# Patient Record
Sex: FEMALE | Race: OTHER | HISPANIC OR LATINO
[De-identification: names, ages, dates, MRNs, and addresses within clinical notes are randomized per-mention and may not be internally consistent; named-entity substitution may affect disease eponyms.]

---

## 2020-05-21 PROBLEM — Z00.00 ENCOUNTER FOR PREVENTIVE HEALTH EXAMINATION: Status: ACTIVE | Noted: 2020-05-21

## 2020-05-22 ENCOUNTER — APPOINTMENT (OUTPATIENT)
Dept: DISASTER EMERGENCY | Facility: CLINIC | Age: 60
End: 2020-05-22

## 2020-05-22 DIAGNOSIS — Z01.818 ENCOUNTER FOR OTHER PREPROCEDURAL EXAMINATION: ICD-10-CM

## 2020-05-23 LAB — SARS-COV-2 N GENE NPH QL NAA+PROBE: NOT DETECTED

## 2020-05-26 ENCOUNTER — APPOINTMENT (OUTPATIENT)
Dept: DISASTER EMERGENCY | Facility: CLINIC | Age: 60
End: 2020-05-26

## 2020-05-27 LAB — SARS-COV-2 N GENE NPH QL NAA+PROBE: NOT DETECTED

## 2020-09-21 ENCOUNTER — INPATIENT (INPATIENT)
Facility: HOSPITAL | Age: 60
LOS: 4 days | Discharge: HOME | DRG: 872 | End: 2020-09-26
Attending: INTERNAL MEDICINE | Admitting: INTERNAL MEDICINE
Payer: MEDICAID

## 2020-09-21 VITALS
WEIGHT: 178.57 LBS | RESPIRATION RATE: 18 BRPM | SYSTOLIC BLOOD PRESSURE: 138 MMHG | DIASTOLIC BLOOD PRESSURE: 73 MMHG | HEIGHT: 64.17 IN | OXYGEN SATURATION: 98 % | HEART RATE: 106 BPM

## 2020-09-21 DIAGNOSIS — N17.9 ACUTE KIDNEY FAILURE, UNSPECIFIED: ICD-10-CM

## 2020-09-21 DIAGNOSIS — Z98.890 OTHER SPECIFIED POSTPROCEDURAL STATES: Chronic | ICD-10-CM

## 2020-09-21 DIAGNOSIS — N39.0 URINARY TRACT INFECTION, SITE NOT SPECIFIED: ICD-10-CM

## 2020-09-21 DIAGNOSIS — Z29.9 ENCOUNTER FOR PROPHYLACTIC MEASURES, UNSPECIFIED: ICD-10-CM

## 2020-09-21 DIAGNOSIS — I10 ESSENTIAL (PRIMARY) HYPERTENSION: ICD-10-CM

## 2020-09-21 DIAGNOSIS — A41.9 SEPSIS, UNSPECIFIED ORGANISM: ICD-10-CM

## 2020-09-21 DIAGNOSIS — E78.1 PURE HYPERGLYCERIDEMIA: ICD-10-CM

## 2020-09-21 DIAGNOSIS — E11.9 TYPE 2 DIABETES MELLITUS WITHOUT COMPLICATIONS: ICD-10-CM

## 2020-09-21 DIAGNOSIS — Z90.710 ACQUIRED ABSENCE OF BOTH CERVIX AND UTERUS: Chronic | ICD-10-CM

## 2020-09-21 DIAGNOSIS — C64.9 MALIGNANT NEOPLASM OF UNSPECIFIED KIDNEY, EXCEPT RENAL PELVIS: ICD-10-CM

## 2020-09-21 LAB
ALBUMIN SERPL ELPH-MCNC: 3.4 G/DL — LOW (ref 3.5–5)
ALP SERPL-CCNC: 70 U/L — SIGNIFICANT CHANGE UP (ref 40–120)
ALT FLD-CCNC: 30 U/L DA — SIGNIFICANT CHANGE UP (ref 10–60)
ANION GAP SERPL CALC-SCNC: 4 MMOL/L — LOW (ref 5–17)
APPEARANCE UR: CLEAR — SIGNIFICANT CHANGE UP
APTT BLD: 30.6 SEC — SIGNIFICANT CHANGE UP (ref 27.5–35.5)
AST SERPL-CCNC: 39 U/L — SIGNIFICANT CHANGE UP (ref 10–40)
BACTERIA # UR AUTO: ABNORMAL /HPF
BASOPHILS # BLD AUTO: 0.02 K/UL — SIGNIFICANT CHANGE UP (ref 0–0.2)
BASOPHILS NFR BLD AUTO: 0.4 % — SIGNIFICANT CHANGE UP (ref 0–2)
BILIRUB SERPL-MCNC: 0.4 MG/DL — SIGNIFICANT CHANGE UP (ref 0.2–1.2)
BILIRUB UR-MCNC: NEGATIVE — SIGNIFICANT CHANGE UP
BUN SERPL-MCNC: 14 MG/DL — SIGNIFICANT CHANGE UP (ref 7–18)
CALCIUM SERPL-MCNC: 9.7 MG/DL — SIGNIFICANT CHANGE UP (ref 8.4–10.5)
CHLORIDE SERPL-SCNC: 104 MMOL/L — SIGNIFICANT CHANGE UP (ref 96–108)
CO2 SERPL-SCNC: 26 MMOL/L — SIGNIFICANT CHANGE UP (ref 22–31)
COLOR SPEC: YELLOW — SIGNIFICANT CHANGE UP
COMMENT - URINE: SIGNIFICANT CHANGE UP
CREAT ?TM UR-MCNC: 58 MG/DL — SIGNIFICANT CHANGE UP
CREAT SERPL-MCNC: 1.37 MG/DL — HIGH (ref 0.5–1.3)
DIFF PNL FLD: ABNORMAL
EOSINOPHIL # BLD AUTO: 0 K/UL — SIGNIFICANT CHANGE UP (ref 0–0.5)
EOSINOPHIL NFR BLD AUTO: 0 % — SIGNIFICANT CHANGE UP (ref 0–6)
EPI CELLS # UR: ABNORMAL /HPF
GLUCOSE BLDC GLUCOMTR-MCNC: 234 MG/DL — HIGH (ref 70–99)
GLUCOSE BLDC GLUCOMTR-MCNC: 295 MG/DL — HIGH (ref 70–99)
GLUCOSE SERPL-MCNC: 138 MG/DL — HIGH (ref 70–99)
GLUCOSE UR QL: NEGATIVE — SIGNIFICANT CHANGE UP
HCT VFR BLD CALC: 35.6 % — SIGNIFICANT CHANGE UP (ref 34.5–45)
HGB BLD-MCNC: 11.9 G/DL — SIGNIFICANT CHANGE UP (ref 11.5–15.5)
IMM GRANULOCYTES NFR BLD AUTO: 0.4 % — SIGNIFICANT CHANGE UP (ref 0–1.5)
INR BLD: 1.15 RATIO — SIGNIFICANT CHANGE UP (ref 0.88–1.16)
KETONES UR-MCNC: NEGATIVE — SIGNIFICANT CHANGE UP
LACTATE SERPL-SCNC: 1.3 MMOL/L — SIGNIFICANT CHANGE UP (ref 0.7–2)
LEUKOCYTE ESTERASE UR-ACNC: ABNORMAL
LYMPHOCYTES # BLD AUTO: 0.71 K/UL — LOW (ref 1–3.3)
LYMPHOCYTES # BLD AUTO: 14.8 % — SIGNIFICANT CHANGE UP (ref 13–44)
MCHC RBC-ENTMCNC: 27.7 PG — SIGNIFICANT CHANGE UP (ref 27–34)
MCHC RBC-ENTMCNC: 33.4 GM/DL — SIGNIFICANT CHANGE UP (ref 32–36)
MCV RBC AUTO: 83 FL — SIGNIFICANT CHANGE UP (ref 80–100)
MONOCYTES # BLD AUTO: 0.41 K/UL — SIGNIFICANT CHANGE UP (ref 0–0.9)
MONOCYTES NFR BLD AUTO: 8.5 % — SIGNIFICANT CHANGE UP (ref 2–14)
NEUTROPHILS # BLD AUTO: 3.64 K/UL — SIGNIFICANT CHANGE UP (ref 1.8–7.4)
NEUTROPHILS NFR BLD AUTO: 75.9 % — SIGNIFICANT CHANGE UP (ref 43–77)
NITRITE UR-MCNC: POSITIVE
NRBC # BLD: 0 /100 WBCS — SIGNIFICANT CHANGE UP (ref 0–0)
OSMOLALITY UR: 343 MOS/KG — SIGNIFICANT CHANGE UP (ref 50–1200)
PH UR: 6 — SIGNIFICANT CHANGE UP (ref 5–8)
PLATELET # BLD AUTO: 193 K/UL — SIGNIFICANT CHANGE UP (ref 150–400)
POTASSIUM SERPL-MCNC: 4 MMOL/L — SIGNIFICANT CHANGE UP (ref 3.5–5.3)
POTASSIUM SERPL-SCNC: 4 MMOL/L — SIGNIFICANT CHANGE UP (ref 3.5–5.3)
PROT ?TM UR-MCNC: 11 MG/DL — SIGNIFICANT CHANGE UP (ref 0–12)
PROT SERPL-MCNC: 8.1 G/DL — SIGNIFICANT CHANGE UP (ref 6–8.3)
PROT UR-MCNC: NEGATIVE — SIGNIFICANT CHANGE UP
PROTHROM AB SERPL-ACNC: 13.4 SEC — SIGNIFICANT CHANGE UP (ref 10.6–13.6)
RAPID RVP RESULT: SIGNIFICANT CHANGE UP
RBC # BLD: 4.29 M/UL — SIGNIFICANT CHANGE UP (ref 3.8–5.2)
RBC # FLD: 13.2 % — SIGNIFICANT CHANGE UP (ref 10.3–14.5)
RBC CASTS # UR COMP ASSIST: ABNORMAL /HPF (ref 0–2)
SODIUM SERPL-SCNC: 134 MMOL/L — LOW (ref 135–145)
SODIUM UR-SCNC: 81 MMOL/L — SIGNIFICANT CHANGE UP
SP GR SPEC: 1.01 — SIGNIFICANT CHANGE UP (ref 1.01–1.02)
UROBILINOGEN FLD QL: NEGATIVE — SIGNIFICANT CHANGE UP
WBC # BLD: 4.8 K/UL — SIGNIFICANT CHANGE UP (ref 3.8–10.5)
WBC # FLD AUTO: 4.8 K/UL — SIGNIFICANT CHANGE UP (ref 3.8–10.5)
WBC UR QL: >50 /HPF (ref 0–5)

## 2020-09-21 PROCEDURE — 74176 CT ABD & PELVIS W/O CONTRAST: CPT | Mod: 26

## 2020-09-21 PROCEDURE — 99285 EMERGENCY DEPT VISIT HI MDM: CPT

## 2020-09-21 PROCEDURE — 71045 X-RAY EXAM CHEST 1 VIEW: CPT | Mod: 26

## 2020-09-21 PROCEDURE — 71250 CT THORAX DX C-: CPT | Mod: 26

## 2020-09-21 RX ORDER — CEFEPIME 1 G/1
2000 INJECTION, POWDER, FOR SOLUTION INTRAMUSCULAR; INTRAVENOUS ONCE
Refills: 0 | Status: COMPLETED | OUTPATIENT
Start: 2020-09-21 | End: 2020-09-21

## 2020-09-21 RX ORDER — ACETAMINOPHEN 500 MG
650 TABLET ORAL EVERY 6 HOURS
Refills: 0 | Status: DISCONTINUED | OUTPATIENT
Start: 2020-09-21 | End: 2020-09-26

## 2020-09-21 RX ORDER — MEROPENEM 1 G/30ML
1000 INJECTION INTRAVENOUS EVERY 8 HOURS
Refills: 0 | Status: DISCONTINUED | OUTPATIENT
Start: 2020-09-22 | End: 2020-09-23

## 2020-09-21 RX ORDER — INSULIN LISPRO 100/ML
10 VIAL (ML) SUBCUTANEOUS ONCE
Refills: 0 | Status: COMPLETED | OUTPATIENT
Start: 2020-09-21 | End: 2020-09-21

## 2020-09-21 RX ORDER — FENOFIBRIC ACID 105 MG/1
160 TABLET ORAL DAILY
Refills: 0 | Status: DISCONTINUED | OUTPATIENT
Start: 2020-09-21 | End: 2020-09-21

## 2020-09-21 RX ORDER — SODIUM CHLORIDE 9 MG/ML
1000 INJECTION INTRAMUSCULAR; INTRAVENOUS; SUBCUTANEOUS
Refills: 0 | Status: DISCONTINUED | OUTPATIENT
Start: 2020-09-21 | End: 2020-09-26

## 2020-09-21 RX ORDER — LEVOTHYROXINE SODIUM 125 MCG
88 TABLET ORAL
Refills: 0 | Status: DISCONTINUED | OUTPATIENT
Start: 2020-09-21 | End: 2020-09-26

## 2020-09-21 RX ORDER — INSULIN LISPRO 100/ML
10 VIAL (ML) SUBCUTANEOUS
Refills: 0 | Status: DISCONTINUED | OUTPATIENT
Start: 2020-09-21 | End: 2020-09-23

## 2020-09-21 RX ORDER — ACETAMINOPHEN 500 MG
650 TABLET ORAL ONCE
Refills: 0 | Status: COMPLETED | OUTPATIENT
Start: 2020-09-21 | End: 2020-09-21

## 2020-09-21 RX ORDER — SODIUM CHLORIDE 9 MG/ML
1700 INJECTION INTRAMUSCULAR; INTRAVENOUS; SUBCUTANEOUS ONCE
Refills: 0 | Status: COMPLETED | OUTPATIENT
Start: 2020-09-21 | End: 2020-09-21

## 2020-09-21 RX ORDER — ATORVASTATIN CALCIUM 80 MG/1
80 TABLET, FILM COATED ORAL AT BEDTIME
Refills: 0 | Status: DISCONTINUED | OUTPATIENT
Start: 2020-09-21 | End: 2020-09-23

## 2020-09-21 RX ORDER — INSULIN LISPRO 100/ML
VIAL (ML) SUBCUTANEOUS
Refills: 0 | Status: DISCONTINUED | OUTPATIENT
Start: 2020-09-21 | End: 2020-09-26

## 2020-09-21 RX ORDER — ERGOCALCIFEROL 1.25 MG/1
50000 CAPSULE ORAL
Refills: 0 | Status: DISCONTINUED | OUTPATIENT
Start: 2020-09-21 | End: 2020-09-26

## 2020-09-21 RX ORDER — VANCOMYCIN HCL 1 G
1250 VIAL (EA) INTRAVENOUS EVERY 12 HOURS
Refills: 0 | Status: DISCONTINUED | OUTPATIENT
Start: 2020-09-22 | End: 2020-09-23

## 2020-09-21 RX ORDER — VANCOMYCIN HCL 1 G
1000 VIAL (EA) INTRAVENOUS ONCE
Refills: 0 | Status: COMPLETED | OUTPATIENT
Start: 2020-09-21 | End: 2020-09-21

## 2020-09-21 RX ORDER — HEPARIN SODIUM 5000 [USP'U]/ML
5000 INJECTION INTRAVENOUS; SUBCUTANEOUS EVERY 8 HOURS
Refills: 0 | Status: DISCONTINUED | OUTPATIENT
Start: 2020-09-21 | End: 2020-09-26

## 2020-09-21 RX ORDER — FENOFIBRATE,MICRONIZED 130 MG
160 CAPSULE ORAL DAILY
Refills: 0 | Status: DISCONTINUED | OUTPATIENT
Start: 2020-09-21 | End: 2020-09-21

## 2020-09-21 RX ORDER — INSULIN GLARGINE 100 [IU]/ML
32 INJECTION, SOLUTION SUBCUTANEOUS AT BEDTIME
Refills: 0 | Status: DISCONTINUED | OUTPATIENT
Start: 2020-09-21 | End: 2020-09-22

## 2020-09-21 RX ADMIN — Medication 650 MILLIGRAM(S): at 19:30

## 2020-09-21 RX ADMIN — Medication 6: at 22:24

## 2020-09-21 RX ADMIN — Medication 650 MILLIGRAM(S): at 15:33

## 2020-09-21 RX ADMIN — Medication 650 MILLIGRAM(S): at 20:12

## 2020-09-21 RX ADMIN — CEFEPIME 100 MILLIGRAM(S): 1 INJECTION, POWDER, FOR SOLUTION INTRAMUSCULAR; INTRAVENOUS at 14:06

## 2020-09-21 RX ADMIN — Medication 10 UNIT(S): at 17:48

## 2020-09-21 RX ADMIN — CEFEPIME 2000 MILLIGRAM(S): 1 INJECTION, POWDER, FOR SOLUTION INTRAMUSCULAR; INTRAVENOUS at 16:31

## 2020-09-21 RX ADMIN — HEPARIN SODIUM 5000 UNIT(S): 5000 INJECTION INTRAVENOUS; SUBCUTANEOUS at 22:25

## 2020-09-21 RX ADMIN — SODIUM CHLORIDE 75 MILLILITER(S): 9 INJECTION INTRAMUSCULAR; INTRAVENOUS; SUBCUTANEOUS at 22:23

## 2020-09-21 RX ADMIN — Medication 250 MILLIGRAM(S): at 13:47

## 2020-09-21 RX ADMIN — SODIUM CHLORIDE 1700 MILLILITER(S): 9 INJECTION INTRAMUSCULAR; INTRAVENOUS; SUBCUTANEOUS at 13:46

## 2020-09-21 RX ADMIN — ATORVASTATIN CALCIUM 80 MILLIGRAM(S): 80 TABLET, FILM COATED ORAL at 22:23

## 2020-09-21 RX ADMIN — Medication 650 MILLIGRAM(S): at 16:29

## 2020-09-21 RX ADMIN — INSULIN GLARGINE 32 UNIT(S): 100 INJECTION, SOLUTION SUBCUTANEOUS at 22:24

## 2020-09-21 NOTE — H&P ADULT - PROBLEM SELECTOR PLAN 3
- BUN/Cr 14/1.37   - could be  prerenal given poor PO intake   - c/w IVF   - f/u BMP   - S/p IVF 1.7 L   f/u Cr urine , Urine lytes , Sodium urine , total protein urine  f/u  FeNa

## 2020-09-21 NOTE — H&P ADULT - NUTRITIONAL ASSESSMENT
Pt taking Metoprolol 50 mg qd  c/w  Metoprolol 50 mg with parameters  Monitor BP closely and adjust medications if clinically indicated.  Dash diet.    - Pt taking Carvedilol , Amlodipine at home    - c/w  Carvedilol , Amlodipine with parameters  - Monitor BP closely and adjust medications if clinically indicated.  - Dash diet when not NPO.

## 2020-09-21 NOTE — H&P ADULT - ASSESSMENT
59 y/o F from home, living alone with [employer] program (niece Karina is a caregiver 5hrs/ day 5days/wk)  with a significant PMHx of HTN, DM and metastatic renal CA (since 2008, mets to breast and pancreas, on chemo x about a year every 2 weeks),  PSH of Left kidney removal in 2008 and bladder reconstruction in 2008, hysterectomy presents to the ED with nonproductive cough x 4 days associated with fever and chills. Patient's last chemo was 2 weeks ago. Patient seen by PMD Dr. Valdivia today, noted for fever 103F and was sent in for further evaluation. Patient relates to history of PNA 2 years ago. Reports slight decreased appetite with no nausea, vomiting, diarrhea or shortness of breath. Accompanied by her niece. Pt lost weight about 20 lbs in 6 months.    In ED;   , fever 102F, cefepime and vanco, NS bolus 1.7L was given. UA positive, CXR shows L basilar atelectasis, no infiltration.

## 2020-09-21 NOTE — CONSULT NOTE ADULT - SUBJECTIVE AND OBJECTIVE BOX
Patient is a 60y old  Female from home, living alone with [employer] [facility] (mohamud Collins is a caregiver 5hrs/ day 5days/wk)  with a significant PMHx of HTN, DM and metastatic renal CA (since , mets to breast and pancreas, on chemo x about a year every 2 weeks),  PSH of Left kidney removal in  and bladder reconstruction in , hysterectomy, now presents to the ER for evaluation of nonproductive cough, fever and chills. Patient's last chemo was 2 weeks ago. Patient seen by PMD Dr. Valdivia today, noted for fever 103F and was sent in for further evaluation. On admission, he found to have fever, tachycardia, positive Urine analysis and negative CXR. She has started on Meropenem and Vancomycin, and the ID consult requested to assist with further evaluation and antibiotic management.         REVIEW OF SYSTEMS: Total of twelve systems have been reviewed with patient and found to be negative unless mentioned in HPI          PAST MEDICAL & SURGICAL HISTORY:  Renal cancer  DM (diabetes mellitus)  HTN (hypertension)  H/O bladder repair surgery  History of kidney surgery  S/P hysterectomy        SOCIAL HISTORY  Alcohol: Does not drink  Tobacco: Does not smoke  Illicit substance use: None      FAMILY HISTORY: Non contributory to the present illness        ALLERGIES: sulfa drugs (Hives)        Vital Signs Last 24 Hrs  T(C): 38.2 (21 Sep 2020 19:00), Max: 38.9 (21 Sep 2020 15:14)  T(F): 100.7 (21 Sep 2020 19:00), Max: 102.1 (21 Sep 2020 15:14)  HR: 98 (21 Sep 2020 19:00) (98 - 106)  BP: 125/56 (21 Sep 2020 19:00) (125/56 - 154/70)  BP(mean): 72 (21 Sep 2020 19:00) (72 - 72)  RR: 18 (21 Sep 2020 19:00) (18 - 18)  SpO2: 98% (21 Sep 2020 19:00) (98% - 100%)        PHYSICAL EXAM:  GENERAL: Not in distress   CHEST/LUNG: Not using accessory muscles  HEART: s1 and s2 present  ABDOMEN:  Nontender and  Nondistended  EXTREMITIES: No pedal  edema  CNS: Awake and Alert        LABS:                        11.9   4.80  )-----------( 193      ( 21 Sep 2020 14:00 )             35.6         09-21    134<L>  |  104  |  14  ----------------------------<  138<H>  4.0   |  26  |  1.37<H>    Ca    9.7      21 Sep 2020 14:00    TPro  8.1  /  Alb  3.4<L>  /  TBili  0.4  /  DBili  x   /  AST  39  /  ALT  30  /  AlkPhos  70      PT/INR - ( 21 Sep 2020 14:00 )   PT: 13.4 sec;   INR: 1.15 ratio      PTT - ( 21 Sep 2020 14:00 )  PTT:30.6 sec        CAPILLARY BLOOD GLUCOSE  POCT Blood Glucose.: 234 mg/dL (21 Sep 2020 17:32)        Urinalysis Basic - ( 21 Sep 2020 14:00 )  Color: Yellow / Appearance: Clear / S.010 / pH: x  Gluc: x / Ketone: Negative  / Bili: Negative / Urobili: Negative   Blood: x / Protein: Negative / Nitrite: Positive   Leuk Esterase: Moderate / RBC: 2-5 /HPF / WBC >50 /HPF   Sq Epi: x / Non Sq Epi: Moderate /HPF / Bacteria: Many /HPF        MEDICATIONS  (STANDING):  atorvastatin 80 milliGRAM(s) Oral at bedtime  ergocalciferol 99860 Unit(s) Oral <User Schedule>  fenofibrate Tablet 160 milliGRAM(s) Oral daily  heparin   Injectable 5000 Unit(s) SubCutaneous every 8 hours  insulin glargine Injectable (LANTUS) 32 Unit(s) SubCutaneous at bedtime  insulin lispro (HumaLOG) corrective regimen sliding scale   SubCutaneous three times a day before meals  insulin lispro Injectable (HumaLOG) 10 Unit(s) SubCutaneous three times a day before meals  levothyroxine 88 MICROGram(s) Oral <User Schedule>    MEDICATIONS  (PRN):  acetaminophen   Tablet .. 650 milliGRAM(s) Oral every 6 hours PRN Temp greater or equal to 38C (100.4F)        RADIOLOGY & ADDITIONAL TESTS:    20 : Xray Chest 1 View-PORTABLE IMMEDIATE (20 @ 13:41) : No evidence for pulmonary consolidation, pleural effusion or pneumothorax.    Small left basilar atelectasis.  The trachea is midline.   The cardiac silhouette is magnified by AP technique.               Patient is a 60y old  Female from home, [facility] [employer] [facility] (mohamud Collins is a caregiver 5hrs/ day 5days/wk)  with a significant PMHx of HTN, DM and metastatic renal CA (since , mets to breast and pancreas, on chemo x about a year every 2 weeks),  PSH of Left kidney removal in  and bladder reconstruction in , hysterectomy, now presents to the ER for evaluation of nonproductive cough, fever and chills. Patient's last chemo was 2 weeks ago. Patient seen by PMD Dr. Valdivia today, noted for fever 103F and was sent in for further evaluation. On admission, he found to have fever, tachycardia, positive Urine analysis and negative CXR. She has started on Meropenem and Vancomycin, and the ID consult requested to assist with further evaluation and antibiotic management.         REVIEW OF SYSTEMS: Total of twelve systems have been reviewed with patient and found to be negative unless mentioned in HPI        PAST MEDICAL & SURGICAL HISTORY:  Renal cancer  DM (diabetes mellitus)  HTN (hypertension)  H/O bladder repair surgery  History of kidney surgery  S/P hysterectomy        SOCIAL HISTORY  Alcohol: Does not drink  Tobacco: Does not smoke  Illicit substance use: None        FAMILY HISTORY: Non contributory to the present illness        ALLERGIES: sulfa drugs (Hives)        Vital Signs Last 24 Hrs  T(C): 38.2 (21 Sep 2020 19:00), Max: 38.9 (21 Sep 2020 15:14)  T(F): 100.7 (21 Sep 2020 19:00), Max: 102.1 (21 Sep 2020 15:14)  HR: 98 (21 Sep 2020 19:00) (98 - 106)  BP: 125/56 (21 Sep 2020 19:00) (125/56 - 154/70)  BP(mean): 72 (21 Sep 2020 19:00) (72 - 72)  RR: 18 (21 Sep 2020 19:00) (18 - 18)  SpO2: 98% (21 Sep 2020 19:00) (98% - 100%)        PHYSICAL EXAM:  GENERAL: Not in distress   CHEST/LUNG: Not using accessory muscles  HEART: s1 and s2 present  ABDOMEN:  Nontender and  Nondistended  EXTREMITIES: No pedal  edema  CNS: Awake and Alert        LABS:                        11.9   4.80  )-----------( 193      ( 21 Sep 2020 14:00 )             35.6         09-21    134<L>  |  104  |  14  ----------------------------<  138<H>  4.0   |  26  |  1.37<H>    Ca    9.7      21 Sep 2020 14:00    TPro  8.1  /  Alb  3.4<L>  /  TBili  0.4  /  DBili  x   /  AST  39  /  ALT  30  /  AlkPhos  70    PT/INR - ( 21 Sep 2020 14:00 )   PT: 13.4 sec;   INR: 1.15 ratio    PTT - ( 21 Sep 2020 14:00 )  PTT:30.6 sec      CAPILLARY BLOOD GLUCOSE  POCT Blood Glucose.: 234 mg/dL (21 Sep 2020 17:32)      Urinalysis Basic - ( 21 Sep 2020 14:00 )  Color: Yellow / Appearance: Clear / S.010 / pH: x  Gluc: x / Ketone: Negative  / Bili: Negative / Urobili: Negative   Blood: x / Protein: Negative / Nitrite: Positive   Leuk Esterase: Moderate / RBC: 2-5 /HPF / WBC >50 /HPF   Sq Epi: x / Non Sq Epi: Moderate /HPF / Bacteria: Many /HPF        MEDICATIONS  (STANDING):  atorvastatin 80 milliGRAM(s) Oral at bedtime  ergocalciferol 86739 Unit(s) Oral <User Schedule>  fenofibrate Tablet 160 milliGRAM(s) Oral daily  heparin   Injectable 5000 Unit(s) SubCutaneous every 8 hours  insulin glargine Injectable (LANTUS) 32 Unit(s) SubCutaneous at bedtime  insulin lispro (HumaLOG) corrective regimen sliding scale   SubCutaneous three times a day before meals  insulin lispro Injectable (HumaLOG) 10 Unit(s) SubCutaneous three times a day before meals  levothyroxine 88 MICROGram(s) Oral <User Schedule>    MEDICATIONS  (PRN):  acetaminophen   Tablet .. 650 milliGRAM(s) Oral every 6 hours PRN Temp greater or equal to 38C (100.4F)        RADIOLOGY & ADDITIONAL TESTS:    20 : Xray Chest 1 View-PORTABLE IMMEDIATE (20 @ 13:41) : No evidence for pulmonary consolidation, pleural effusion or pneumothorax.    Small left basilar atelectasis.  The trachea is midline.   The cardiac silhouette is magnified by AP technique.

## 2020-09-21 NOTE — H&P ADULT - PROBLEM SELECTOR PLAN 4
- Patient takes Metformin, basaglar 65units at 5pm, humalog  20units in AM, 28units at noon, 22 units in the evening at home  - will hold metformin  - will start lower dose of insulin, lantus 32units _ humalog 10units TID + sliding scale  - pt has continuous glucose monitoring device on her abd ( Dexcom g6), but niece was told to make sure with finger stick if it's low or high  - monitor blood glucose with finger stick in the hospital  - f/u HgA1c  - diabetic diet

## 2020-09-21 NOTE — H&P ADULT - HISTORY OF PRESENT ILLNESS
No 59 y/o F from home, living alone with [employer] program (niece Karina is a caregiver 5hrs/ day 5days/wk)  with a significant PMHx of HTN, DM and metastatic renal CA (since 2008, mets to breast and pancreas, on chemo x about a year every 2 weeks),  PSH of Left kidney removal in 2008 and bladder reconstruction in 2008, hysterectomy presents to the ED with nonproductive cough x 4 days associated with fever and chills. Patient's last chemo was 2 weeks ago. Patient seen by PMD Dr. Valdivia today, noted for fever 103F and was sent in for further evaluation. Patient relates to history of PNA 2 years ago. Reports slight decreased appetite with no nausea, vomiting, diarrhea or shortness of breath. Accompanied by her niece. Pt lost weight about 20 lbs in 6 months.    In ED;   , fever 102F, cefepime and vanco, NS bolus 1.7L was given. UA positive, CXR shows L basilar atelectasis, no infiltration.    GOC: FULL CODE

## 2020-09-21 NOTE — ED PROVIDER NOTE - CLINICAL SUMMARY MEDICAL DECISION MAKING FREE TEXT BOX
61 y/o F on chemo for renal CA presents for fever, cough and chills. Concern for PNA/sepsis. Will perform sepsis workup and reassess.

## 2020-09-21 NOTE — H&P ADULT - PROBLEM SELECTOR PLAN 2
- positive UA   - febrile 102F, pt is on chemo: complicated UTI  - no dysuria , urinary incontinence or flank pain  - Lactate 1.3   - c/w IV fluids   - Will start with meropenem + vanco daily  - f/u blood cultures (specimen received)   - f/u urine cultures (specimen received)  ** ID consulted Dr. Rojas

## 2020-09-21 NOTE — H&P ADULT - PROBLEM SELECTOR PLAN 6
- Pt is not taking MILADIS meds at home    - Monitor BP closely and adjust medications if clinically indicated.  - DASH diet. - Pt is not taking PO meds at home    - Monitor BP closely and adjust medications if clinically indicated.  - DASH diet.

## 2020-09-21 NOTE — H&P ADULT - PROBLEM SELECTOR PLAN 8
IMPROVE VTE Individual Risk Assessment  RISK                                                                Points  [  ] Previous VTE                                                  3  [  ] Thrombophilia                                               2  [  ] Lower limb paralysis                                      2        (unable to hold up >15 seconds)    [x  ] Current Cancer                                              2         (within 6 months)  [  x] Immobilization > 24 hrs                                1  [  ] ICU/CCU stay > 24 hours                              1  [ x ] Age > 60                                                      1  IMPROVE VTE Score ____4_____  Heparin SC for DVT ppx due to JAX

## 2020-09-21 NOTE — H&P ADULT - PROBLEM SELECTOR PLAN 1
p/w fever 102F , , meets SIRS criteria, UA positive, no WBC count ,lactate 1.3  - Likely 2/2 UTI vs fever malignancy  -s/p cefepime 2g + vanco, 1.7 L NS bolus  in the ED   - will start on meropenem + vanco till urine cx comes back   - f/u UCx and BCx.   ID Dr. Rojas consulted

## 2020-09-21 NOTE — ED ADULT TRIAGE NOTE - CHIEF COMPLAINT QUOTE
Non productive cough with fever x 3 days ,on s/p chemo 2 weeks ago ,h/o willem breast ca with mets to kidney, referred by PCP to r/o neutropenic fever per ems

## 2020-09-21 NOTE — H&P ADULT - PROBLEM SELECTOR PLAN 5
Pt is following Dr. Pleaez  - pt was diagnosed in 2008, L kidney was removed, had chemo for a year, stopped then started about a year ago  - On chemo every 2 weeks, last chemo was 2 wks ago  [facility] was notified and consulted

## 2020-09-21 NOTE — ED PROVIDER NOTE - OBJECTIVE STATEMENT
61 y/o F with a significant PMHx of HTN, DM and metastatic renal CA presents to the ED with nonproductive cough x 4 days associated with fever and chills. Patient's last chemo was 2 weeks ago. Patient seen by PMD Dr. Valdivia today and was sent in for further evaluation. Patient relates to history of PNA 2 years ago. Reports normal appetite with no nausea, vomiting, diarrhea or shortness of breath. Accompanied by daughter.

## 2020-09-21 NOTE — ED PROVIDER NOTE - CARE PLAN
Principal Discharge DX:	UTI (urinary tract infection)  Secondary Diagnosis:	Fever  Secondary Diagnosis:	Renal cancer

## 2020-09-21 NOTE — H&P ADULT - ATTENDING COMMENTS
59 y/o F from home, living alone with [employer] [facility] (niece Karina is a caregiver 5hrs/ day 5days/wk)  with a significant PMHx of HTN, DM and metastatic renal CA (since 2008, mets to breast and pancreas, on chemo x about a year every 2 weeks),  PSH of Left kidney removal in 2008 and bladder reconstruction in 2008, hysterectomy presents to the ED with nonproductive cough x 4 days associated with fever and chills. Patient's last chemo was 2 weeks ago. Patient seen by PMD Dr. Olivera today, noted for fever 103F and was sent in for further evaluation. Patient relates to history of PNA 2 years ago. Reports slight decreased appetite with no nausea, vomiting, diarrhea or shortness of breath. Accompanied by her niece. Pt lost weight about 20 lbs in 6 months.    In ED;   , fever 102F, cefepime and vanco, NS bolus 1.7L was given. UA positive, CXR shows L basilar atelectasis, no infiltration.    GOC: FULL CODE       assessment  --  uti, sepsis, acute bronchitis, paola, h/o HTN, DM and metastatic renal CA (since 2008, mets to breast and pancreas, on chemo x about a year every 2 weeks),  PSH of Left kidney removal in 2008 and bladder reconstruction in 2008, hysterectomy    plan  --  admit to med, rocephin, bronchodilators, lantus, humalog actid, cont preadmit home meds, gi and dvt profilaxis,  cbc, bmp, mg, phos, lipids, tsh, bld cx, ucx, hgba1c      id cons  pulm cons  heme-onc cons   endo cons

## 2020-09-21 NOTE — CONSULT NOTE ADULT - ASSESSMENT
Patient is a 60y old  Female from home, [facility] (mohamud Collins is a caregiver 5hrs/ day 5days/wk)  with a significant PMHx of HTN, DM and metastatic renal CA (since 2008, mets to breast and pancreas, on chemo x about a year every 2 weeks),  PSH of Left kidney removal in 2008 and bladder reconstruction in 2008, hysterectomy, now presents to the ER for evaluation of nonproductive cough, fever and chills. Patient's last chemo was 2 weeks ago. Patient seen by PMD Dr. Valdivia today, noted for fever 103F and was sent in for further evaluation. On admission, he found to have fever, tachycardia, positive Urine analysis and negative CXR. She has started on Meropenem and Vancomycin, and the ID consult requested to assist with further evaluation and antibiotic management.       # Sepsis ( Fever + tachycardia )  # UTI  # Metastatic Renal cancer- on chemo therapy, last one was about 2 weeks ago    would recommend:    1. Follow up Urine and Blood cultures  2. Monitor Temp. and c/w supportive care  3. Aspiration precaution  4. Continue Meropenem and Vancomycin until work up is done  5. Follow up COVID 19 PCR and c/w precaution  6. Consider CT chest without contrast     d/w Admitting  resident     will follow the patient with you and make further recommendation based on the clinical course and Lab results  Thank you for the opportunity to participate in Ms. SELF's care      Attending Attestation:    Spent more than 65 minutes on total encounter, more than 50 % of the visit was spent counseling and/or coordinating care by the Attending physician.         Patient is a 60y old  Female from home, [facility] (mohamud Collins is a caregiver 5hrs/ day 5days/wk)  with a significant PMHx of HTN, DM and metastatic renal CA (since 2008, mets to breast and pancreas, on chemo x about a year every 2 weeks),  PSH of Left kidney removal in 2008 and bladder reconstruction in 2008, hysterectomy, now presents to the ER for evaluation of nonproductive cough, fever and chills. Patient's last chemo was 2 weeks ago. Patient seen by PMD Dr. Valdivia today, noted for fever 103F and was sent in for further evaluation. On admission, he found to have fever, tachycardia, positive Urine analysis and negative CXR. She has started on Meropenem and Vancomycin, and the ID consult requested to assist with further evaluation and antibiotic management.     # Sepsis ( Fever + tachycardia )  # UTI  # Metastatic Renal cancer- on chemo therapy, last one was about 2 weeks ago    would recommend:    1. Follow up Urine and Blood cultures  2. Monitor Temp. and c/w supportive care  3. Aspiration precaution  4. Continue Meropenem and Vancomycin until work up is done  5. Follow up COVID 19 PCR and c/w precaution  6. Consider CT chest without contrast     d/w Admitting  resident     will follow the patient with you and make further recommendation based on the clinical course and Lab results  Thank you for the opportunity to participate in Ms. SELF's care      Attending Attestation:    Spent more than 65 minutes on total encounter, more than 50 % of the visit was spent counseling and/or coordinating care by the Attending physician.

## 2020-09-21 NOTE — H&P ADULT - NSHPREVIEWOFSYSTEMS_GEN_ALL_CORE
61 y/o F with a significant PMHx of HTN, DM and metastatic renal CA presents to the ED with nonproductive cough x 4 days associated with fever and chills. Patient's last chemo was 2 weeks ago. Patient seen by PMD Dr. Valdivia today and was sent in for further evaluation. Patient relates to history of PNA 2 years ago. Reports normal appetite with no nausea, vomiting, diarrhea or shortness of breath. Accompanied by daughter

## 2020-09-22 DIAGNOSIS — J45.909 UNSPECIFIED ASTHMA, UNCOMPLICATED: ICD-10-CM

## 2020-09-22 DIAGNOSIS — R91.1 SOLITARY PULMONARY NODULE: ICD-10-CM

## 2020-09-22 LAB
24R-OH-CALCIDIOL SERPL-MCNC: 34.4 NG/ML — SIGNIFICANT CHANGE UP (ref 30–80)
A1C WITH ESTIMATED AVERAGE GLUCOSE RESULT: 7.5 % — HIGH (ref 4–5.6)
ANION GAP SERPL CALC-SCNC: 6 MMOL/L — SIGNIFICANT CHANGE UP (ref 5–17)
BASOPHILS # BLD AUTO: 0.04 K/UL — SIGNIFICANT CHANGE UP (ref 0–0.2)
BASOPHILS NFR BLD AUTO: 1 % — SIGNIFICANT CHANGE UP (ref 0–2)
BUN SERPL-MCNC: 14 MG/DL — SIGNIFICANT CHANGE UP (ref 7–18)
CALCIUM SERPL-MCNC: 8.7 MG/DL — SIGNIFICANT CHANGE UP (ref 8.4–10.5)
CHLORIDE SERPL-SCNC: 109 MMOL/L — HIGH (ref 96–108)
CHOLEST SERPL-MCNC: 118 MG/DL — SIGNIFICANT CHANGE UP (ref 10–199)
CO2 SERPL-SCNC: 23 MMOL/L — SIGNIFICANT CHANGE UP (ref 22–31)
CREAT SERPL-MCNC: 1.28 MG/DL — SIGNIFICANT CHANGE UP (ref 0.5–1.3)
EOSINOPHIL # BLD AUTO: 0 K/UL — SIGNIFICANT CHANGE UP (ref 0–0.5)
EOSINOPHIL NFR BLD AUTO: 0 % — SIGNIFICANT CHANGE UP (ref 0–6)
ESTIMATED AVERAGE GLUCOSE: 169 MG/DL — HIGH (ref 68–114)
FERRITIN SERPL-MCNC: 143 NG/ML — SIGNIFICANT CHANGE UP (ref 15–150)
FOLATE SERPL-MCNC: >20 NG/ML — SIGNIFICANT CHANGE UP
GLUCOSE BLDC GLUCOMTR-MCNC: 134 MG/DL — HIGH (ref 70–99)
GLUCOSE BLDC GLUCOMTR-MCNC: 242 MG/DL — HIGH (ref 70–99)
GLUCOSE BLDC GLUCOMTR-MCNC: 272 MG/DL — HIGH (ref 70–99)
GLUCOSE BLDC GLUCOMTR-MCNC: 281 MG/DL — HIGH (ref 70–99)
GLUCOSE SERPL-MCNC: 281 MG/DL — HIGH (ref 70–99)
HCT VFR BLD CALC: 30.1 % — LOW (ref 34.5–45)
HCV AB S/CO SERPL IA: 0.12 S/CO — SIGNIFICANT CHANGE UP (ref 0–0.99)
HCV AB SERPL-IMP: SIGNIFICANT CHANGE UP
HDLC SERPL-MCNC: 23 MG/DL — LOW
HGB BLD-MCNC: 10.2 G/DL — LOW (ref 11.5–15.5)
IRON SATN MFR SERPL: 14 UG/DL — LOW (ref 40–150)
IRON SATN MFR SERPL: 4 % — LOW (ref 15–50)
LIPID PNL WITH DIRECT LDL SERPL: 52 MG/DL — SIGNIFICANT CHANGE UP
LYMPHOCYTES # BLD AUTO: 0.57 K/UL — LOW (ref 1–3.3)
LYMPHOCYTES # BLD AUTO: 14 % — SIGNIFICANT CHANGE UP (ref 13–44)
MAGNESIUM SERPL-MCNC: 1.6 MG/DL — SIGNIFICANT CHANGE UP (ref 1.6–2.6)
MANUAL SMEAR VERIFICATION: SIGNIFICANT CHANGE UP
MCHC RBC-ENTMCNC: 27.9 PG — SIGNIFICANT CHANGE UP (ref 27–34)
MCHC RBC-ENTMCNC: 33.9 GM/DL — SIGNIFICANT CHANGE UP (ref 32–36)
MCV RBC AUTO: 82.5 FL — SIGNIFICANT CHANGE UP (ref 80–100)
MONOCYTES # BLD AUTO: 0.2 K/UL — SIGNIFICANT CHANGE UP (ref 0–0.9)
MONOCYTES NFR BLD AUTO: 5 % — SIGNIFICANT CHANGE UP (ref 2–14)
NEUTROPHILS # BLD AUTO: 3.26 K/UL — SIGNIFICANT CHANGE UP (ref 1.8–7.4)
NEUTROPHILS NFR BLD AUTO: 78 % — HIGH (ref 43–77)
NEUTS BAND # BLD: 2 % — SIGNIFICANT CHANGE UP (ref 0–8)
NRBC # BLD: 0 /100 — SIGNIFICANT CHANGE UP (ref 0–0)
OSMOLALITY SERPL: 293 MOSMOL/KG — SIGNIFICANT CHANGE UP (ref 280–301)
PHOSPHATE SERPL-MCNC: 2.1 MG/DL — LOW (ref 2.5–4.5)
PLAT MORPH BLD: NORMAL — SIGNIFICANT CHANGE UP
PLATELET # BLD AUTO: 162 K/UL — SIGNIFICANT CHANGE UP (ref 150–400)
POTASSIUM SERPL-MCNC: 3.8 MMOL/L — SIGNIFICANT CHANGE UP (ref 3.5–5.3)
POTASSIUM SERPL-SCNC: 3.8 MMOL/L — SIGNIFICANT CHANGE UP (ref 3.5–5.3)
PROCALCITONIN SERPL-MCNC: 0.39 NG/ML — HIGH (ref 0.02–0.1)
RBC # BLD: 3.59 M/UL — LOW (ref 3.8–5.2)
RBC # BLD: 3.65 M/UL — LOW (ref 3.8–5.2)
RBC # FLD: 13 % — SIGNIFICANT CHANGE UP (ref 10.3–14.5)
RBC BLD AUTO: NORMAL — SIGNIFICANT CHANGE UP
RETICS #: 48.5 K/UL — SIGNIFICANT CHANGE UP (ref 25–125)
RETICS/RBC NFR: 1.4 % — SIGNIFICANT CHANGE UP (ref 0.5–2.5)
SARS-COV-2 IGG SERPL QL IA: POSITIVE
SARS-COV-2 IGM SERPL IA-ACNC: 87.8 INDEX — HIGH
SARS-COV-2 RNA SPEC QL NAA+PROBE: SIGNIFICANT CHANGE UP
SODIUM SERPL-SCNC: 138 MMOL/L — SIGNIFICANT CHANGE UP (ref 135–145)
TIBC SERPL-MCNC: 312 UG/DL — SIGNIFICANT CHANGE UP (ref 250–450)
TOTAL CHOLESTEROL/HDL RATIO MEASUREMENT: 5.1 RATIO — SIGNIFICANT CHANGE UP (ref 3.3–7.1)
TRIGL SERPL-MCNC: 213 MG/DL — HIGH (ref 10–149)
TSH SERPL-MCNC: 6.34 UU/ML — HIGH (ref 0.34–4.82)
UIBC SERPL-MCNC: 298 UG/DL — SIGNIFICANT CHANGE UP (ref 110–370)
VIT B12 SERPL-MCNC: 338 PG/ML — SIGNIFICANT CHANGE UP (ref 232–1245)
WBC # BLD: 4.08 K/UL — SIGNIFICANT CHANGE UP (ref 3.8–10.5)
WBC # FLD AUTO: 4.08 K/UL — SIGNIFICANT CHANGE UP (ref 3.8–10.5)

## 2020-09-22 RX ORDER — INSULIN GLARGINE 100 [IU]/ML
40 INJECTION, SOLUTION SUBCUTANEOUS AT BEDTIME
Refills: 0 | Status: DISCONTINUED | OUTPATIENT
Start: 2020-09-22 | End: 2020-09-23

## 2020-09-22 RX ORDER — PREGABALIN 225 MG/1
1000 CAPSULE ORAL ONCE
Refills: 0 | Status: COMPLETED | OUTPATIENT
Start: 2020-09-22 | End: 2020-09-24

## 2020-09-22 RX ADMIN — Medication 10 UNIT(S): at 07:56

## 2020-09-22 RX ADMIN — Medication 10 UNIT(S): at 17:39

## 2020-09-22 RX ADMIN — Medication 166.67 MILLIGRAM(S): at 11:45

## 2020-09-22 RX ADMIN — Medication 4: at 07:56

## 2020-09-22 RX ADMIN — MEROPENEM 100 MILLIGRAM(S): 1 INJECTION INTRAVENOUS at 05:11

## 2020-09-22 RX ADMIN — MEROPENEM 100 MILLIGRAM(S): 1 INJECTION INTRAVENOUS at 22:38

## 2020-09-22 RX ADMIN — HEPARIN SODIUM 5000 UNIT(S): 5000 INJECTION INTRAVENOUS; SUBCUTANEOUS at 05:10

## 2020-09-22 RX ADMIN — INSULIN GLARGINE 40 UNIT(S): 100 INJECTION, SOLUTION SUBCUTANEOUS at 22:35

## 2020-09-22 RX ADMIN — Medication 6: at 11:21

## 2020-09-22 RX ADMIN — Medication 650 MILLIGRAM(S): at 11:00

## 2020-09-22 RX ADMIN — Medication 6: at 17:38

## 2020-09-22 RX ADMIN — ATORVASTATIN CALCIUM 80 MILLIGRAM(S): 80 TABLET, FILM COATED ORAL at 22:39

## 2020-09-22 RX ADMIN — Medication 10 UNIT(S): at 11:21

## 2020-09-22 RX ADMIN — HEPARIN SODIUM 5000 UNIT(S): 5000 INJECTION INTRAVENOUS; SUBCUTANEOUS at 15:44

## 2020-09-22 RX ADMIN — HEPARIN SODIUM 5000 UNIT(S): 5000 INJECTION INTRAVENOUS; SUBCUTANEOUS at 22:39

## 2020-09-22 RX ADMIN — Medication 88 MICROGRAM(S): at 05:11

## 2020-09-22 RX ADMIN — MEROPENEM 100 MILLIGRAM(S): 1 INJECTION INTRAVENOUS at 15:44

## 2020-09-22 NOTE — CHART NOTE - NSCHARTNOTEFT_GEN_A_CORE
Chart and meds reviewed.  Patient seen and examined.    CC: Fever    SUBJECTIVE/ROS: feels "better" Denies CP/palpitation/SOB/HA/dizziness/abd pain/n/v/d/f/c/dysuria/hematuria; urinates much      MEDICATIONS  (STANDING):  atorvastatin 80 milliGRAM(s) Oral at bedtime  ergocalciferol 38411 Unit(s) Oral <User Schedule>  heparin   Injectable 5000 Unit(s) SubCutaneous every 8 hours  insulin glargine Injectable (LANTUS) 32 Unit(s) SubCutaneous at bedtime  insulin lispro (HumaLOG) corrective regimen sliding scale   SubCutaneous three times a day before meals  insulin lispro Injectable (HumaLOG) 10 Unit(s) SubCutaneous three times a day before meals  levothyroxine 88 MICROGram(s) Oral <User Schedule>  meropenem  IVPB 1000 milliGRAM(s) IV Intermittent every 8 hours  sodium chloride 0.9%. 1000 milliLiter(s) (75 mL/Hr) IV Continuous <Continuous>  Triglide (fenofibrate) 160 milliGRAM(s) 160 milliGRAM(s) Oral daily  vancomycin  IVPB 1250 milliGRAM(s) IV Intermittent every 12 hours    MEDICATIONS  (PRN):  acetaminophen   Tablet .. 650 milliGRAM(s) Oral every 6 hours PRN Temp greater or equal to 38C (100.4F)      VITALS:  Vital Signs Last 24 Hrs  T(C): 37.4 (22 Sep 2020 07:18), Max: 38.9 (21 Sep 2020 15:14)  T(F): 99.4 (22 Sep 2020 07:18), Max: 102.1 (21 Sep 2020 15:14)  HR: 92 (22 Sep 2020 07:18) (92 - 106)  BP: 102/41 (22 Sep 2020 07:18) (102/41 - 154/70)  BP(mean): 72 (21 Sep 2020 19:00) (72 - 72)  RR: 18 (22 Sep 2020 07:18) (18 - 18)  SpO2: 100% (22 Sep 2020 07:18) (96% - 100%)      PHYSICAL EXAM:    HEENT:  pupils equal and reactive, EOMI, no oropharyngeal lesions, erythema, exudates, oral thrush    NECK:   supple, no carotid bruits, no palpable lymph nodes, no thyromegaly    CV:  +S1, +S2, regular, no murmurs or rubs    RESP:   lungs clear to auscultation bilaterally, no wheezing, rales, rhonchi, good air entry bilaterally    BREAST:  not examined    GI:  obese abd soft, non-tender, non-distended, normal BS, no bruits, no abdominal masses, no palpable masses    RECTAL:  not examined    :  no suprapubic tenderness    MSK:   normal muscle tone, no atrophy, no rigidity, no contractions    EXT:   no clubbing, no cyanosis, no edema, no calf pain, swelling or erythema    VASCULAR:  pulses equal and symmetric in the upper and lower extremities    NEURO:  AAOX3, no focal neurological deficits, follows all commands, able to move extremities spontaneously    SKIN:  no ulcers, lesions or rashes        LABS:                        10.2   4.08  )-----------( 162      ( 22 Sep 2020 06:17 )             30.1     09-22    138  |  109<H>  |  14  ----------------------------<  281<H>  3.8   |  23  |  1.28    Ca    8.7      22 Sep 2020 06:17  Phos  2.1     -  Mg     1.6     -    TPro  8.1  /  Alb  3.4<L>  /  TBili  0.4  /  DBili  x   /  AST  39  /  ALT  30  /  AlkPhos  70  09-21    LIVER FUNCTIONS - ( 21 Sep 2020 14:00 )  Alb: 3.4 g/dL / Pro: 8.1 g/dL / ALK PHOS: 70 U/L / ALT: 30 U/L DA / AST: 39 U/L / GGT: x           PT/INR - ( 21 Sep 2020 14:00 )   PT: 13.4 sec;   INR: 1.15 ratio         PTT - ( 21 Sep 2020 14:00 )  PTT:30.6 sec  Urinalysis Basic - ( 21 Sep 2020 14:00 )    Color: Yellow / Appearance: Clear / S.010 / pH: x  Gluc: x / Ketone: Negative  / Bili: Negative / Urobili: Negative   Blood: x / Protein: Negative / Nitrite: Positive   Leuk Esterase: Moderate / RBC: 2-5 /HPF / WBC >50 /HPF   Sq Epi: x / Non Sq Epi: Moderate /HPF / Bacteria: Many /HPF    Lactate, Blood: 1.3 mmol/L ( @ 14:00)    Blood, Urine: Trace ( @ 14:00)    A/P    60 year old Woman with hx of HTN, DM and metastatic renal CA (since , mets to breast and pancreas, on chemo every 2 weeks, last treatment ~2 weeks prior to ED arrival), follows by Dr Benigno Palma-onc group), left nephrectomy with bladder reconstruction in , hysterectomy who on  presented from PCP's office for fever of 103; pt reports 4 day hx of nonproductive cough, fever, chills    Sepsis with JAX likely due to complicated UTI vs malignancy   positive UA   con't vanco and meropenem for now pending Ucx results as per ID (Dr Rojas)    Lactate 1.3   f/u UCx and BCx  TSH elevated, likely sick thyroid vs hypothyroid  KARLENE Smith consulted    Family called and was updated on plan Chart and meds reviewed.  Patient seen and examined.    CC: Fever    SUBJECTIVE/ROS: feels "better" Denies CP/palpitation/SOB/HA/dizziness/abd pain/n/v/d/f/c/dysuria/hematuria; urinates much      MEDICATIONS  (STANDING):  atorvastatin 80 milliGRAM(s) Oral at bedtime  ergocalciferol 50292 Unit(s) Oral <User Schedule>  heparin   Injectable 5000 Unit(s) SubCutaneous every 8 hours  insulin glargine Injectable (LANTUS) 32 Unit(s) SubCutaneous at bedtime  insulin lispro (HumaLOG) corrective regimen sliding scale   SubCutaneous three times a day before meals  insulin lispro Injectable (HumaLOG) 10 Unit(s) SubCutaneous three times a day before meals  levothyroxine 88 MICROGram(s) Oral <User Schedule>  meropenem  IVPB 1000 milliGRAM(s) IV Intermittent every 8 hours  sodium chloride 0.9%. 1000 milliLiter(s) (75 mL/Hr) IV Continuous <Continuous>  Triglide (fenofibrate) 160 milliGRAM(s) 160 milliGRAM(s) Oral daily  vancomycin  IVPB 1250 milliGRAM(s) IV Intermittent every 12 hours    MEDICATIONS  (PRN):  acetaminophen   Tablet .. 650 milliGRAM(s) Oral every 6 hours PRN Temp greater or equal to 38C (100.4F)      VITALS:  Vital Signs Last 24 Hrs  T(C): 37.4 (22 Sep 2020 07:18), Max: 38.9 (21 Sep 2020 15:14)  T(F): 99.4 (22 Sep 2020 07:18), Max: 102.1 (21 Sep 2020 15:14)  HR: 92 (22 Sep 2020 07:18) (92 - 106)  BP: 102/41 (22 Sep 2020 07:18) (102/41 - 154/70)  BP(mean): 72 (21 Sep 2020 19:00) (72 - 72)  RR: 18 (22 Sep 2020 07:18) (18 - 18)  SpO2: 100% (22 Sep 2020 07:18) (96% - 100%)      PHYSICAL EXAM:    HEENT:  pupils equal and reactive, EOMI, no oropharyngeal lesions, erythema, exudates, oral thrush    NECK:   supple, no carotid bruits, no palpable lymph nodes, no thyromegaly    CV:  +S1, +S2, regular, no murmurs or rubs    RESP:   lungs clear to auscultation bilaterally, no wheezing, rales, rhonchi, good air entry bilaterally    BREAST:  not examined    GI:  obese abd soft, non-tender, non-distended, normal BS, no bruits, no abdominal masses, no palpable masses    RECTAL:  not examined    :  no suprapubic tenderness    MSK:   normal muscle tone, no atrophy, no rigidity, no contractions    EXT:   no clubbing, no cyanosis, no edema, no calf pain, swelling or erythema    VASCULAR:  pulses equal and symmetric in the upper and lower extremities    NEURO:  AAOX3, no focal neurological deficits, follows all commands, able to move extremities spontaneously    SKIN:  no ulcers, lesions or rashes        LABS:                        10.2   4.08  )-----------( 162      ( 22 Sep 2020 06:17 )             30.1     09-22    138  |  109<H>  |  14  ----------------------------<  281<H>  3.8   |  23  |  1.28    Ca    8.7      22 Sep 2020 06:17  Phos  2.1     -  Mg     1.6     -    TPro  8.1  /  Alb  3.4<L>  /  TBili  0.4  /  DBili  x   /  AST  39  /  ALT  30  /  AlkPhos  70  09-21    LIVER FUNCTIONS - ( 21 Sep 2020 14:00 )  Alb: 3.4 g/dL / Pro: 8.1 g/dL / ALK PHOS: 70 U/L / ALT: 30 U/L DA / AST: 39 U/L / GGT: x           PT/INR - ( 21 Sep 2020 14:00 )   PT: 13.4 sec;   INR: 1.15 ratio         PTT - ( 21 Sep 2020 14:00 )  PTT:30.6 sec  Urinalysis Basic - ( 21 Sep 2020 14:00 )    Color: Yellow / Appearance: Clear / S.010 / pH: x  Gluc: x / Ketone: Negative  / Bili: Negative / Urobili: Negative   Blood: x / Protein: Negative / Nitrite: Positive   Leuk Esterase: Moderate / RBC: 2-5 /HPF / WBC >50 /HPF   Sq Epi: x / Non Sq Epi: Moderate /HPF / Bacteria: Many /HPF    Lactate, Blood: 1.3 mmol/L ( @ 14:00)    Blood, Urine: Trace ( @ 14:00)    A/P    60 year old Woman with hx of HTN, DM and metastatic renal CA (since , mets to breast and pancreas, on chemo every 2 weeks, last treatment ~2 weeks prior to ED arrival), follows by Dr Pealez Heme-onc group), left nephrectomy with bladder reconstruction in , hysterectomy who on  presented from PCP's office for fever of 103; pt reports 4 day hx of nonproductive cough, fever, chills    1. Sepsis with JAX likely due to complicated UTI vs malignancy  2. DM  3. HTN  4. Malignancy   positive UA   con't vanco and meropenem for now pending Ucx results as per ID (Dr Rojas)    Lactate 1.3   f/u UCx and BCx  TSH elevated, likely sick thyroid vs hypothyroid  ck A1c / T3 / T4 with am labs  heme-onc consulted by admitting team  KARLENE Smith consulted - f/u input  con't current management / above meds      Family called to inquire of pt's status and was updated on plan Chart and meds reviewed.  Patient seen and examined.    CC: Fever    SUBJECTIVE/ROS: feels "better" Denies CP/palpitation/SOB/HA/dizziness/abd pain/n/v/d/f/c/dysuria/hematuria; urinates much      MEDICATIONS  (STANDING):  atorvastatin 80 milliGRAM(s) Oral at bedtime  ergocalciferol 85633 Unit(s) Oral <User Schedule>  heparin   Injectable 5000 Unit(s) SubCutaneous every 8 hours  insulin glargine Injectable (LANTUS) 32 Unit(s) SubCutaneous at bedtime  insulin lispro (HumaLOG) corrective regimen sliding scale   SubCutaneous three times a day before meals  insulin lispro Injectable (HumaLOG) 10 Unit(s) SubCutaneous three times a day before meals  levothyroxine 88 MICROGram(s) Oral <User Schedule>  meropenem  IVPB 1000 milliGRAM(s) IV Intermittent every 8 hours  sodium chloride 0.9%. 1000 milliLiter(s) (75 mL/Hr) IV Continuous <Continuous>  Triglide (fenofibrate) 160 milliGRAM(s) 160 milliGRAM(s) Oral daily  vancomycin  IVPB 1250 milliGRAM(s) IV Intermittent every 12 hours    MEDICATIONS  (PRN):  acetaminophen   Tablet .. 650 milliGRAM(s) Oral every 6 hours PRN Temp greater or equal to 38C (100.4F)      VITALS:  Vital Signs Last 24 Hrs  T(C): 37.4 (22 Sep 2020 07:18), Max: 38.9 (21 Sep 2020 15:14)  T(F): 99.4 (22 Sep 2020 07:18), Max: 102.1 (21 Sep 2020 15:14)  HR: 92 (22 Sep 2020 07:18) (92 - 106)  BP: 102/41 (22 Sep 2020 07:18) (102/41 - 154/70)  BP(mean): 72 (21 Sep 2020 19:00) (72 - 72)  RR: 18 (22 Sep 2020 07:18) (18 - 18)  SpO2: 100% (22 Sep 2020 07:18) (96% - 100%)      PHYSICAL EXAM:    HEENT:  pupils equal and reactive, EOMI, no oropharyngeal lesions, erythema, exudates, oral thrush    NECK:   supple, no carotid bruits, no palpable lymph nodes, no thyromegaly    CV:  +S1, +S2, regular, no murmurs or rubs    RESP:   lungs clear to auscultation bilaterally, no wheezing, rales, rhonchi, good air entry bilaterally    BREAST:  not examined    GI:  obese abd soft, non-tender, non-distended, normal BS, no bruits, no abdominal masses, no palpable masses; has SQ insulin monitor    RECTAL:  not examined    :  no suprapubic tenderness    MSK:   normal muscle tone, no atrophy, no rigidity, no contractions    EXT:   no clubbing, no cyanosis, no edema, no calf pain, swelling or erythema    VASCULAR:  pulses equal and symmetric in the upper and lower extremities    NEURO:  AAOX3, no focal neurological deficits, follows all commands, able to move extremities spontaneously    SKIN:  no ulcers, lesions or rashes        LABS:                        10.2   4.08  )-----------( 162      ( 22 Sep 2020 06:17 )             30.1     09-22    138  |  109<H>  |  14  ----------------------------<  281<H>  3.8   |  23  |  1.28    Ca    8.7      22 Sep 2020 06:17  Phos  2.1     09-22  Mg     1.6     09-22    TPro  8.1  /  Alb  3.4<L>  /  TBili  0.4  /  DBili  x   /  AST  39  /  ALT  30  /  AlkPhos  70  09-21    LIVER FUNCTIONS - ( 21 Sep 2020 14:00 )  Alb: 3.4 g/dL / Pro: 8.1 g/dL / ALK PHOS: 70 U/L / ALT: 30 U/L DA / AST: 39 U/L / GGT: x           PT/INR - ( 21 Sep 2020 14:00 )   PT: 13.4 sec;   INR: 1.15 ratio         PTT - ( 21 Sep 2020 14:00 )  PTT:30.6 sec  Urinalysis Basic - ( 21 Sep 2020 14:00 )    Color: Yellow / Appearance: Clear / S.010 / pH: x  Gluc: x / Ketone: Negative  / Bili: Negative / Urobili: Negative   Blood: x / Protein: Negative / Nitrite: Positive   Leuk Esterase: Moderate / RBC: 2-5 /HPF / WBC >50 /HPF   Sq Epi: x / Non Sq Epi: Moderate /HPF / Bacteria: Many /HPF    Lactate, Blood: 1.3 mmol/L ( @ 14:00)    Blood, Urine: Trace ( @ 14:00)    A/P    60 year old Woman with hx of HTN, DM and metastatic renal CA (since , mets to breast and pancreas, on chemo every 2 weeks, last treatment ~2 weeks prior to ED arrival), follows by Dr Pelaez Heme-onc group), left nephrectomy with bladder reconstruction in , hysterectomy who on  presented from PCP's office for fever of 103; pt reports 4 day hx of nonproductive cough, fever, chills    1. Sepsis with JAX likely due to complicated UTI vs malignancy  2. DM  3. HTN  4. Malignancy   positive UA   con't vanco and meropenem for now pending Ucx results as per ID (Dr Rojas)    Lactate 1.3   f/u UCx and BCx  TSH elevated, likely sick thyroid vs hypothyroid  ck A1c / T3 / T4 with am labs  heme-onc consulted by admitting team  KARLENE Smith consulted - f/u input  con't current management / above meds      Family called to inquire of pt's status and was updated on plan

## 2020-09-22 NOTE — CONSULT NOTE ADULT - ASSESSMENT
OncHx: Pt well-known to our practice and follows with Oncologist Dr. Pelaez. Pt was diagnosed with Renal Cell Carcinoma in 2008, s/p left nephrectomy the same year. She then developed a pancreatic and a breast lesion both confirmed to be metastatic RCC and the patient was started on sutent. In 11/2016 her RCC was in remission and sutent was discontinued. in 05/2019 she developed another breast mass and the bx was c/w RCC and at that time started on Nivo/Ipi. She developed B/L breast lesions in 08/2020 and Bx confirmed met RCC, pt continued on Nivo/Ipi, last given 09/10/20    Problem# Metastatic RCC Dx 2008  s/p  Left nephrectomy  s/p sutent and currently on immunotherapy with Nivo/Ipi  tx last given 9/10/20  Hold all Onc tx during admission  pt denies diarrhea/SOB  Pulse Ox 100% on RA  CT c/w known breast and pancreatic masses Bx proven RCC  upon discharge pt to f/u with Oncologist Dr. Pelaez    Problem# Anemia  Hgb=10.2 MCV 82  etiology JAX/Malignancy/B12 deficiency  anemia panel ordered  B12 low at 338  TSH=6.3  Rec:  daily CBC  B12 1,000mcg IM x 1  iron studies    Problem# Sepsis  p/w fever 102F , ,,lactate 1.3  WBC & ANC WNL  UA +  - Likely 2/2 UTI vs fever malignancy  on meropenem + vanco till urine cx comes back   UCx and BCx pending.   ID following    Problem# VTE Prophylaxis  continue Heparin SC       Thank you, will continue to monitor the patient.  Please call with any questions [phone]  Above reviewed with Attending Dr. Canela

## 2020-09-22 NOTE — CONSULT NOTE ADULT - SUBJECTIVE AND OBJECTIVE BOX
Complete note to follow History of Present Illness:  Reason for Admission: sepsis  History of Present Illness:   61 y/o F from home, living alone with [employer] program (niece Karina is a caregiver 5hrs/ day 5days/wk)  with a significant PMHx of HTN, DM and metastatic renal CA (since 2008, mets to breast and pancreas, on chemo x about a year every 2 weeks),  PSH of Left kidney removal in 2008 and bladder reconstruction in 2008, hysterectomy presents to the ED with nonproductive cough x 4 days associated with fever and chills. Patient's last chemo was 2 weeks ago. Patient seen by PMD Dr. Valdivia today, noted for fever 103F and was sent in for further evaluation. Patient relates to history of PNA 2 years ago. Reports slight decreased appetite with no nausea, vomiting, diarrhea or shortness of breath. Accompanied by her niece. Pt lost weight about 20 lbs in 6 months.    In ED;   , fever 102F, cefepime and vanco, NS bolus 1.7L was given. UA positive, CXR shows L basilar atelectasis, no infiltration.    OncHx: Pt well-known to our practice and follows with Oncologist Dr. Pelaez. Pt was diagnosed with Renal Cell Carcinoma in 2008, s/p left nephrectomy the same year. She then developed a pancreatic and a breast lesion both confirmed to be metastatic RCC and the patient was started on sutent. In 11/2016 her RCC was in remission and sutent was discontinued. in 05/2019 she developed another breast mass and the bx was c/w RCC and at that time started on Nivo/Ipi. She developed B/L breast lesions in 08/2020 and Bx confirmed met RCC, pt continued on Nivo/Ipi, last given 09/10/20    GOC: FULL CODE        Allergies and Intolerances:        Allergies:  	sulfa drugs: Drug Category, Hives    Home Medications:   * Outpatient Medication Status not yet specified  · 	ergocalciferol 50,000 intl units (1.25 mg) oral capsule: Last Dose Taken:  , 1 cap(s) orally once a week  · 	Basaglar KwikPen 100 units/mL subcutaneous solution: Last Dose Taken:  , 65 unit(s) subcutaneous once a day (in the evening) at 5pm  · 	Admelog SoloStar 100 units/mL injectable solution: Last Dose Taken:  , 20 unit(s) injectable once a day (in the morning)  · 	Admelog SoloStar 100 units/mL injectable solution: Last Dose Taken:  , 28 unit(s) injectable once a day (in the afternoon) at 12pm  · 	Admelog SoloStar 100 units/mL injectable solution: Last Dose Taken:  , 22 unit(s) injectable once a day (in the evening)  · 	metFORMIN 500 mg oral tablet: Last Dose Taken:  , 1 tab(s) orally 2 times a day  · 	levothyroxine 88 mcg (0.088 mg) oral tablet: Last Dose Taken:  , 1 tab(s) orally once a day  · 	fenofibrate 160 mg oral tablet: Last Dose Taken:  , 1 tab(s) orally once a day  · 	atorvastatin 80 mg oral tablet: Last Dose Taken:  , 1 tab(s) orally once a day    MEDICATIONS  (STANDING):  atorvastatin 80 milliGRAM(s) Oral at bedtime  ergocalciferol 17529 Unit(s) Oral <User Schedule>  heparin   Injectable 5000 Unit(s) SubCutaneous every 8 hours  insulin glargine Injectable (LANTUS) 32 Unit(s) SubCutaneous at bedtime  insulin lispro (HumaLOG) corrective regimen sliding scale   SubCutaneous three times a day before meals  insulin lispro Injectable (HumaLOG) 10 Unit(s) SubCutaneous three times a day before meals  levothyroxine 88 MICROGram(s) Oral <User Schedule>  meropenem  IVPB 1000 milliGRAM(s) IV Intermittent every 8 hours  sodium chloride 0.9%. 1000 milliLiter(s) (75 mL/Hr) IV Continuous <Continuous>  Triglide (fenofibrate) 160 milliGRAM(s) 160 milliGRAM(s) Oral daily  vancomycin  IVPB 1250 milliGRAM(s) IV Intermittent every 12 hours      MEDICATIONS  (PRN):  acetaminophen   Tablet .. 650 milliGRAM(s) Oral every 6 hours PRN Temp greater or equal to 38C (100.4F)      Patient History:    Past Medical, Past Surgical, and Family History:  PAST MEDICAL HISTORY:  DM (diabetes mellitus)   HTN (hypertension)   Renal cancer.     PAST SURGICAL HISTORY:  H/O bladder repair surgery   History of left nephrectomy 2008  S/P hysterectomy.     Social History:  Social History (marital status, living situation, occupation, tobacco use, alcohol and drug use, and sexual history): Pt denied smoking, alcohol use or illicit drug use.     Tobacco Screening:  · Core Measure Site	Yes  · Has the patient used tobacco in the past 30 days?	No    Risk Assessment:    Present on Admission:  Deep Venous Thrombosis	no  Pulmonary Embolus	no     Heart Failure:  Does this patient have a history of or has been diagnosed with heart failure? unknown.    HIV Screen (per [facility] Department of Health, HIV screening must be offered to every individual between ages 13 and 64)	Offered and patient accepted    Vitals:  Vital Signs Last 24 Hrs  T(C): 36.8 (22 Sep 2020 12:12), Max: 39.2 (22 Sep 2020 11:00)  T(F): 98.3 (22 Sep 2020 12:12), Max: 102.5 (22 Sep 2020 11:00)  HR: 100 (22 Sep 2020 12:12) (92 - 102)  BP: 153/59 (22 Sep 2020 12:12) (102/41 - 154/70)  BP(mean): 72 (21 Sep 2020 19:00) (72 - 72)  ABP: --  ABP(mean): --  RR: 17 (22 Sep 2020 12:12) (17 - 18)  SpO2: 100% (22 Sep 2020 12:12) (96% - 100%)      ROS:  CONSTITUTIONAL:  No fevers, chills, sweats  HEENT:  Eyes:  No diplopia or blurred vision. No earache, sore throat or runny nose.  CARDIOVASCULAR:  No pressure, squeezing, tightness, or heaviness about the chest; no palpitations.  RESPIRATORY:  No cough, SOB  GASTROINTESTINAL:  No abdominal pain, nausea, vomiting or diarrhea.  GENITOURINARY:  No dysuria, frequency or urgency.  NEUROLOGIC:  No paresthesias, fasciculations, seizures or weakness.  PSYCHIATRIC:  No disorder of thought or mood.        PHYSICAL EXAMINATION:  GENERAL: lying in bed, NAD   HEENT: Head is normocephalic and atraumatic. EOMI. Mucous membranes are moist.   NECK: Supple.   LUNGS: Clear to auscultation without wheezing, rales, or rhonchi. Respirations unlabored  HEART: S1S2 without murmur.  ABDOMEN: Soft, nontender, and nondistended. BS+  No hepatosplenomegaly is noted.  EXTREMITIES: Without any cyanosis, clubbing, rash, lesions or edema.  NEUROLOGIC: Grossly intact.    Labs:             CBC Full  -  ( 22 Sep 2020 06:17 )  WBC Count : 4.08 K/uL  RBC Count : 3.65 M/uL  Hemoglobin : 10.2 g/dL  Hematocrit : 30.1 %  Platelet Count - Automated : 162 K/uL  Mean Cell Volume : 82.5 fl  Mean Cell Hemoglobin : 27.9 pg  Mean Cell Hemoglobin Concentration : 33.9 gm/dL  Auto Neutrophil # : 3.26 K/uL  Auto Lymphocyte # : 0.57 K/uL  Auto Monocyte # : 0.20 K/uL  Auto Eosinophil # : 0.00 K/uL  Auto Basophil # : 0.04 K/uL  Auto Neutrophil % : 78.0 %  Auto Lymphocyte % : 14.0 %  Auto Monocyte % : 5.0 %  Auto Eosinophil % : 0.0 %  Auto Basophil % : 1.0 %    09-22    138  |  109<H>  |  14  ----------------------------<  281<H>  3.8   |  23  |  1.28    Ca    8.7      22 Sep 2020 06:17  Phos  2.1     09-22  Mg     1.6     09-22    TPro  8.1  /  Alb  3.4<L>  /  TBili  0.4  /  DBili  x   /  AST  39  /  ALT  30  /  AlkPhos  70  09-21    Radiology:  < from: CT Abdomen and Pelvis No Cont (09.21.20 @ 21:21) >  EXAM:  CT ABDOMEN AND PELVIS                          EXAM:  CT CHEST                            PROCEDURE DATE:  09/21/2020          INTERPRETATION:  CLINICAL INFORMATION: Cough, fever, sepsis. Acute kidney injury. Metastatic renal cancer with metastasis to the breast and pancreas, on chemotherapy. Status post left nephrectomy and bladder reconstruction.    COMPARISON: None.    PROCEDURE:  CT of the Chest, Abdomen and Pelvis was performed without intravenous contrast.  Intravenous contrast: None.  Oral contrast: None.  Sagittal and coronal reformats were performed.    FINDINGS:  CHEST:  LUNGS AND LARGE AIRWAYS: Patent central airways. 5 mm right middle lobe pulmonary nodule (2:87) evaluation of the lung parenchyma is limited by significantrespiratory motion.  PLEURA: No pleural effusion.  VESSELS: Within normal limits.  HEART: Heart size is normal. No pericardial effusion. Aortic valvular calcification.  MEDIASTINUM AND MADDY: Couple nonspecific small volume mediastinal nodes.  CHEST WALL AND LOWER NECK: 2.3 x 1.8 cm posterior right breast lesion containing what appears to be a postsurgical clip. Left supraclavicular lymphadenopathy. For example a 1.3 x 1.3 cm node (4:14)    ABDOMEN AND PELVIS:    Evaluation is limited by motion.  LIVER: Within normal limits.  BILE DUCTS: Normal caliber.  GALLBLADDER: Appears contracted.  SPLEEN: Within normal limits.  PANCREAS: 1.6 cm area of hypoattenuation within the pancreatic body (4:106) as well as ill-defined enlargement of the pancreatic head. No pancreatic ductal dilatation.  ADRENALS: Within normal limits.  KIDNEYS/URETERS: Status post left nephrectomy. No nuvia right-sided hydronephrosis.    BLADDER: Within normal limits.  REPRODUCTIVE ORGANS: Hysterectomy.    BOWEL: No bowel obstruction.  PERITONEUM: No ascites.  VESSELS: Within normal limits.  RETROPERITONEUM/LYMPH NODES: Retroperitoneal lymphadenopathy. For example a 1.5 x 1.1 cm left para-aortic node (4:114). Additional numerous small volume mesenteric nodes. Left pelvic sidewall node (4:174) measures 1.4 x 0.7 cm.  ABDOMINAL WALL: Postsurgical changes. Lower anterior abdominal wall nonobstructive bowel containing [place] hernia.  BONES: No aggressive osseous lesion..    IMPRESSION:  5 mm right middle lobe pulmonarynodule. No parenchymal consolidation or pleural effusion.    Left supraclavicular lymphadenopathy.    2.2 x 1.8 cm ovoid right posterior breast lesion, with associated surgical clip. Correlate with mammographic and postsurgical results.    Hypodensity within the pancreatic body as well as ill-defined and enlargement of the pancreatic head, concerning for underlying lesions and may correspond to patient's known pancreatic metastatic disease. Correlation with prior outside imaging, if available is recommended to evaluate for interval change. Evaluation is limited without the administration of intravenous contrast material.    Retroperitoneal lymphadenopathy.    Left nephrectomy. No nuvia right renal hydronephrosis. Evaluation is limited by motion.    2.3 x 1.8 cm posterior ovoid right breast lesion, with associated surgical clip. Correlate with mammographic findings and/or postsurgical results.          CHIP MOTTA M.D., RADIOLOGY RESIDENT  This document has been electronically signed.  CHIP MOTTA M.D., RADIOLOGY RESIDENT  This document has been electronically signed. Sep 22 2020 12:02AM    < end of copied text >

## 2020-09-22 NOTE — PROGRESS NOTE ADULT - SUBJECTIVE AND OBJECTIVE BOX
Patient is a 60y old  Female who presents with a chief complaint of sepsis (21 Sep 2020 20:17)    pt seen in icu [  ], reg med floor [   ], bed [  ], chair at bedside [   ], a+o x3 [  ], lethargic [  ],  nad [  ]    santos [  ], ngt [  ], peg [  ], et tube [  ], cent line [  ], picc line [  ]        Allergies    sulfa drugs (Hives)        Vitals    T(F): 99.6 (09-22-20 @ 06:13), Max: 102.1 (09-21-20 @ 15:14)  HR: 97 (09-22-20 @ 05:26) (93 - 106)  BP: 106/59 (09-22-20 @ 06:13) (106/59 - 154/70)  RR: 18 (09-22-20 @ 06:13) (18 - 18)  SpO2: 96% (09-22-20 @ 06:13) (96% - 100%)  Wt(kg): --  CAPILLARY BLOOD GLUCOSE      POCT Blood Glucose.: 295 mg/dL (21 Sep 2020 21:55)      Labs                          10.2   x     )-----------( 162      ( 22 Sep 2020 06:17 )             30.1       09-21    134<L>  |  104  |  14  ----------------------------<  138<H>  4.0   |  26  |  1.37<H>    Ca    9.7      21 Sep 2020 14:00    TPro  8.1  /  Alb  3.4<L>  /  TBili  0.4  /  DBili  x   /  AST  39  /  ALT  30  /  AlkPhos  70  09-21                Radiology Results      Meds    MEDICATIONS  (STANDING):  atorvastatin 80 milliGRAM(s) Oral at bedtime  ergocalciferol 36324 Unit(s) Oral <User Schedule>  heparin   Injectable 5000 Unit(s) SubCutaneous every 8 hours  insulin glargine Injectable (LANTUS) 32 Unit(s) SubCutaneous at bedtime  insulin lispro (HumaLOG) corrective regimen sliding scale   SubCutaneous three times a day before meals  insulin lispro Injectable (HumaLOG) 10 Unit(s) SubCutaneous three times a day before meals  levothyroxine 88 MICROGram(s) Oral <User Schedule>  meropenem  IVPB 1000 milliGRAM(s) IV Intermittent every 8 hours  sodium chloride 0.9%. 1000 milliLiter(s) (75 mL/Hr) IV Continuous <Continuous>  Triglide (fenofibrate) 160 milliGRAM(s) 160 milliGRAM(s) Oral daily  vancomycin  IVPB 1250 milliGRAM(s) IV Intermittent every 12 hours      MEDICATIONS  (PRN):  acetaminophen   Tablet .. 650 milliGRAM(s) Oral every 6 hours PRN Temp greater or equal to 38C (100.4F)      Physical Exam    Neuro :  no focal deficits  Respiratory: CTA B/L  CV: RRR, S1S2, no murmurs,   Abdominal: Soft, NT, ND +BS,  Extremities: No edema, + peripheral pulses    ASSESSMENT    Urinary tract infection    Renal cancer    DM (diabetes mellitus)    HTN (hypertension)    H/O bladder repair surgery    History of kidney surgery    S/P hysterectomy        PLAN     Patient is a 60y old  Female who presents with a chief complaint of sepsis (21 Sep 2020 20:17)    pt seen in ed [x  ], reg med floor [   ], bed [ x ], chair at bedside [   ], a+o x3 [ x ], lethargic [  ],  nad [ x ]      Allergies    sulfa drugs (Hives)        Vitals    T(F): 99.6 (09-22-20 @ 06:13), Max: 102.1 (09-21-20 @ 15:14)  HR: 97 (09-22-20 @ 05:26) (93 - 106)  BP: 106/59 (09-22-20 @ 06:13) (106/59 - 154/70)  RR: 18 (09-22-20 @ 06:13) (18 - 18)  SpO2: 96% (09-22-20 @ 06:13) (96% - 100%)  Wt(kg): --  CAPILLARY BLOOD GLUCOSE      POCT Blood Glucose.: 295 mg/dL (21 Sep 2020 21:55)      Labs                          10.2   x     )-----------( 162      ( 22 Sep 2020 06:17 )             30.1       09-21    134<L>  |  104  |  14  ----------------------------<  138<H>  4.0   |  26  |  1.37<H>    Ca    9.7      21 Sep 2020 14:00    TPro  8.1  /  Alb  3.4<L>  /  TBili  0.4  /  DBili  x   /  AST  39  /  ALT  30  /  AlkPhos  70  09-21      Radiology Results    < from: CT Chest No Cont (09.21.20 @ 21:21) >  IMPRESSION:  5 mm right middle lobe pulmonarynodule. No parenchymal consolidation or pleural effusion.    Left supraclavicular lymphadenopathy.    2.2 x 1.8 cm ovoid right posterior breast lesion, with associated surgical clip. Correlate with mammographic and postsurgical results.    Hypodensity within the pancreatic body as well as ill-defined and enlargement of the pancreatic head, concerning for underlying lesions and may correspond to patient's known pancreatic metastatic disease. Correlation with prior outside imaging, if available is recommended to evaluate for interval change. Evaluation is limited without the administration of intravenous contrast material.    Retroperitoneal lymphadenopathy.    Left nephrectomy. No nuvia right renal hydronephrosis. Evaluation is limited by motion.    2.3 x 1.8 cm posterior ovoid right breast lesion, with associated surgical clip. Correlate with mammographic findings and/or postsurgical results.        < end of copied text >      Meds    MEDICATIONS  (STANDING):  atorvastatin 80 milliGRAM(s) Oral at bedtime  ergocalciferol 61942 Unit(s) Oral <User Schedule>  heparin   Injectable 5000 Unit(s) SubCutaneous every 8 hours  insulin glargine Injectable (LANTUS) 32 Unit(s) SubCutaneous at bedtime  insulin lispro (HumaLOG) corrective regimen sliding scale   SubCutaneous three times a day before meals  insulin lispro Injectable (HumaLOG) 10 Unit(s) SubCutaneous three times a day before meals  levothyroxine 88 MICROGram(s) Oral <User Schedule>  meropenem  IVPB 1000 milliGRAM(s) IV Intermittent every 8 hours  sodium chloride 0.9%. 1000 milliLiter(s) (75 mL/Hr) IV Continuous <Continuous>  Triglide (fenofibrate) 160 milliGRAM(s) 160 milliGRAM(s) Oral daily  vancomycin  IVPB 1250 milliGRAM(s) IV Intermittent every 12 hours      MEDICATIONS  (PRN):  acetaminophen   Tablet .. 650 milliGRAM(s) Oral every 6 hours PRN Temp greater or equal to 38C (100.4F)      Physical Exam    Neuro :  no focal deficits  Respiratory: CTA B/L  CV: RRR, S1S2, no murmurs,   Abdominal: Soft, NT, ND +BS,  Extremities: No edema, + peripheral pulses    ASSESSMENT    Urinary tract infection   sepsis,   acute bronchitis,   pulm nodule  paola,   h/o metastatic renal CA (since 2008, mets to breast and pancreas, on chemo x about a year every 2 weeks),    DM (diabetes mellitus)  HTN (hypertension)  PSH of Left kidney removal in 2008 and bladder reconstruction in 2008,  S/P hysterectomy        PLAN    cont merem and vanco  id f/u   f/u blood and ucx  cont  bronchodilators,   pulm cons  ct chest abd pelv noted above   heme onc cons  cont lantus, humalog actid,   endo cons   [id number]  cont current med

## 2020-09-22 NOTE — CONSULT NOTE ADULT - ATTENDING COMMENTS
I have reviewed patient's data and participated in the management of the patient along with Kaela PAREDES as well as hematology/med oncology faculty during the daily heme/onc case review. I reviewed pertinent clinical information, PE,  labs as well as A/P as outline above, in agreement and edited as appropriate.

## 2020-09-22 NOTE — PROGRESS NOTE ADULT - ASSESSMENT
Patient is a 60y old  Female from home, living alone with [employer] program (mohamud Collins is a caregiver 5hrs/ day 5days/wk)  with a significant PMHx of HTN, DM and metastatic renal CA (since 2008, mets to breast and pancreas, on chemo x about a year every 2 weeks),  PSH of Left kidney removal in 2008 and bladder reconstruction in 2008, hysterectomy, now presents to the ER for evaluation of nonproductive cough, fever and chills. Patient's last chemo was 2 weeks ago. Patient seen by PMD Dr. Valdivia today, noted for fever 103F and was sent in for further evaluation. On admission, he found to have fever, tachycardia, positive Urine analysis and negative CXR. She has started on Meropenem and Vancomycin, and the ID consult requested to assist with further evaluation and antibiotic management.     # Sepsis ( Fever + tachycardia )  # UTI  - E.coli  # Metastatic Renal cancer- s/p  Left nephrectomy on chemo therapy, last one was about 2 weeks ago  # COVID 19 is negative    would recommend:    1. Follow up Final Urine and Blood cultures  2. Monitor Temp. and c/w supportive care  3. Aspiration precaution  4. Continue Meropenem and Vancomycin until work up is done  5. Monitor and Keep Vancomycin level between 15 to 20      Attending Attestation:    Spent more than 45 minutes on total encounter, more than 50 % of the visit was spent counseling and/or coordinating care by the Attending physician.   Patient is a 60y old  Female from home, living alone with [employer] program (mohamud Collins is a caregiver 5hrs/ day 5days/wk)  with a significant PMHx of HTN, DM and metastatic renal CA (since 2008, mets to breast and pancreas, on chemo x about a year every 2 weeks),  PSH of Left kidney removal in 2008 and bladder reconstruction in 2008, hysterectomy, now presents to the ER for evaluation of nonproductive cough, fever and chills. Patient's last chemo was 2 weeks ago. Patient seen by PMD Dr. Valdivia today, noted for fever 103F and was sent in for further evaluation. On admission, he found to have fever, tachycardia, positive Urine analysis and negative CXR. She has started on Meropenem and Vancomycin, and the ID consult requested to assist with further evaluation and antibiotic management.     # Sepsis ( Fever + tachycardia )  # UTI  - E.coli  # Metastatic Renal cancer- s/p  Left nephrectomy on chemo therapy, last one was about 2 weeks ago  # COVID 19 is negative    would recommend:    1. Follow up Final Urine and Blood cultures  2. Monitor Temp. and c/w supportive care  3. Aspiration precaution  4. Continue Meropenem and Vancomycin until work up is done  5. Monitor and Keep Vancomycin level between 15 to 20    Attending Attestation:    Spent more than 45 minutes on total encounter, more than 50 % of the visit was spent counseling and/or coordinating care by the Attending physician.

## 2020-09-22 NOTE — CONSULT NOTE ADULT - SUBJECTIVE AND OBJECTIVE BOX
PULMONARY CONSULT NOTE      ANA SELF  MRN-[id number]    Patient is a 60y old  Female who presents with a chief complaint of sepsis (22 Sep 2020 06:37)    History of Present Illness:  Reason for Admission: sepsis  History of Present Illness:   61 y/o F from home, living alone with CDPAP program (niece Karina is a caregiver 5hrs/ day 5days/wk)  with a significant PMHx of HTN, DM and metastatic renal CA (since , mets to breast and pancreas, on chemo x about a year every 2 weeks),  PSH of Left kidney removal in  and bladder reconstruction in , hysterectomy presents to the ED with nonproductive cough x 4 days associated with fever and chills. Patient's last chemo was 2 weeks ago. Patient seen by PMD Dr. Valdivia today, noted for fever 103F and was sent in for further evaluation. Patient relates to history of PNA 2 years ago. Reports slight decreased appetite with no nausea, vomiting, diarrhea or shortness of breath. Accompanied by her niece. Pt lost weight about 20 lbs in 6 months.      HISTORY OF PRESENT ILLNESS: As above. awake, alert, comfortable on bed in NAD. Has Hx of Asthma on Albuterol prn and Dulera HFA BiD. No recentt exacerbation although has been coughing but  no sob.    MEDICATIONS  (STANDING):  atorvastatin 80 milliGRAM(s) Oral at bedtime  ergocalciferol 51517 Unit(s) Oral <User Schedule>  heparin   Injectable 5000 Unit(s) SubCutaneous every 8 hours  insulin glargine Injectable (LANTUS) 32 Unit(s) SubCutaneous at bedtime  insulin lispro (HumaLOG) corrective regimen sliding scale   SubCutaneous three times a day before meals  insulin lispro Injectable (HumaLOG) 10 Unit(s) SubCutaneous three times a day before meals  levothyroxine 88 MICROGram(s) Oral <User Schedule>  meropenem  IVPB 1000 milliGRAM(s) IV Intermittent every 8 hours  sodium chloride 0.9%. 1000 milliLiter(s) (75 mL/Hr) IV Continuous <Continuous>  Triglide (fenofibrate) 160 milliGRAM(s) 160 milliGRAM(s) Oral daily  vancomycin  IVPB 1250 milliGRAM(s) IV Intermittent every 12 hours      MEDICATIONS  (PRN):  acetaminophen   Tablet .. 650 milliGRAM(s) Oral every 6 hours PRN Temp greater or equal to 38C (100.4F)      Allergies    sulfa drugs (Hives)    Intolerances        PAST MEDICAL & SURGICAL HISTORY:  Renal cancer    DM (diabetes mellitus)    HTN (hypertension)    H/O bladder repair surgery    History of kidney surgery    S/P hysterectomy        FAMILY HISTORY:      SOCIAL HISTORY  Smoking History:     REVIEW OF SYSTEMS:    CONSTITUTIONAL:  No fevers, chills, sweats    HEENT:  Eyes:  No diplopia or blurred vision. ENT:  No earache, sore throat or runny nose.    CARDIOVASCULAR:  No pressure, squeezing, tightness, or heaviness about the chest; no palpitations.    RESPIRATORY:  Per HPI    GASTROINTESTINAL:  No abdominal pain, nausea, vomiting or diarrhea.    GENITOURINARY:  No dysuria, frequency or urgency.    NEUROLOGIC:  No paresthesias, fasciculations, seizures or weakness.    PSYCHIATRIC:  No disorder of thought or mood.    Vital Signs Last 24 Hrs  T(C): 37.4 (22 Sep 2020 07:18), Max: 38.9 (21 Sep 2020 15:14)  T(F): 99.4 (22 Sep 2020 07:18), Max: 102.1 (21 Sep 2020 15:14)  HR: 92 (22 Sep 2020 07:18) (92 - 106)  BP: 102/41 (22 Sep 2020 07:18) (102/41 - 154/70)  BP(mean): 72 (21 Sep 2020 19:00) (72 - 72)  RR: 18 (22 Sep 2020 07:18) (18 - 18)  SpO2: 100% (22 Sep 2020 07:18) (96% - 100%)  I&O's Detail      PHYSICAL EXAMINATION:    GENERAL: The patient is a well-developed, well-nourished _____in no apparent distress.     HEENT: Head is normocephalic and atraumatic. Extraocular muscles are intact. Mucous membranes are moist.     NECK: Supple.     LUNGS: Clear to auscultation without wheezing, rales, or rhonchi. Respirations unlabored    HEART: Regular rate and rhythm without murmur.    ABDOMEN: Soft, nontender, and nondistended.  No hepatosplenomegaly is noted.    EXTREMITIES: Without any cyanosis, clubbing, rash, lesions or edema.    NEUROLOGIC: Grossly intact.      LABS:                        10.2   4.08  )-----------( 162      ( 22 Sep 2020 06:17 )             30.1     09-    138  |  109<H>  |  14  ----------------------------<  281<H>  3.8   |  23  |  1.28    Ca    8.7      22 Sep 2020 06:17  Phos  2.1       Mg     1.6     -    TPro  8.1  /  Alb  3.4<L>  /  TBili  0.4  /  DBili  x   /  AST  39  /  ALT  30  /  AlkPhos  70      PT/INR - ( 21 Sep 2020 14:00 )   PT: 13.4 sec;   INR: 1.15 ratio         PTT - ( 21 Sep 2020 14:00 )  PTT:30.6 sec  Urinalysis Basic - ( 21 Sep 2020 14:00 )    Color: Yellow / Appearance: Clear / S.010 / pH: x  Gluc: x / Ketone: Negative  / Bili: Negative / Urobili: Negative   Blood: x / Protein: Negative / Nitrite: Positive   Leuk Esterase: Moderate / RBC: 2-5 /HPF / WBC >50 /HPF   Sq Epi: x / Non Sq Epi: Moderate /HPF / Bacteria: Many /HPF                Lactate, Blood: 1.3 mmol/L (20 @ 14:00)    < from: CT Chest No Cont (20 @ 21:21) >  5 mm right middle lobe pulmonarynodule. No parenchymal consolidation or pleural effusion.    Left supraclavicular lymphadenopathy.    2.2 x 1.8 cm ovoid right posterior breast lesion, with associated surgical clip. Correlate with mammographic and postsurgical results.    Hypodensity within the pancreatic body as well as ill-defined and enlargement of the pancreatic head, concerning for underlying lesions and may correspond to patient's known pancreatic metastatic disease. Correlation with prior outside imaging, if available is recommended to evaluate for interval change. Evaluation is limited without the administration of intravenous contrast material.    Retroperitoneal lymphadenopathy.    Left nephrectomy. No nuvia right renal hydronephrosis. Evaluation is limited by motion.    2.3 x 1.8 cm posterior ovoid right breast lesion, with associated surgical clip. Correlate with mammographic findings and/or postsurgical results.      < end of copied text >      MICROBIOLOGY:    RADIOLOGY & ADDITIONAL STUDIES:    CXR:    Ct scan chest:    ekg;    echo:

## 2020-09-22 NOTE — PROGRESS NOTE ADULT - SUBJECTIVE AND OBJECTIVE BOX
Patient is seen and examined at the bed side, is afebrile now, spiked fever earlier. The Blood cultures still in process and the Urine culture grew E.coli.        REVIEW OF SYSTEMS: All other review systems are negative        ALLERGIES: sulfa drugs (Hives)        Vital Signs Last 24 Hrs  T(C): 37.6 (22 Sep 2020 15:38), Max: 39.2 (22 Sep 2020 11:00)  T(F): 99.7 (22 Sep 2020 15:38), Max: 102.5 (22 Sep 2020 11:00)  HR: 103 (22 Sep 2020 15:38) (92 - 103)  BP: 144/62 (22 Sep 2020 15:38) (102/41 - 153/59)  BP(mean): --  RR: 18 (22 Sep 2020 15:38) (17 - 18)  SpO2: 100% (22 Sep 2020 15:38) (96% - 100%)        PHYSICAL EXAM:  GENERAL: Not in distress   CHEST/LUNG: Not using accessory muscles  HEART: s1 and s2 present  ABDOMEN:  Nontender and  Nondistended  EXTREMITIES: No pedal  edema  CNS: Awake and Alert        LABS:                        10.2   4.08  )-----------( 162      ( 22 Sep 2020 06:17 )             30.1                           11.9   4.80  )-----------( 193      ( 21 Sep 2020 14:00 )             35.6         09-22    138  |  109<H>  |  14  ----------------------------<  281<H>  3.8   |  23  |  1.28    Ca    8.7      22 Sep 2020 06:17  Phos  2.1     09-22  Mg     1.6     22    TPro  8.1  /  Alb  3.4<L>  /  TBili  0.4  /  DBili  x   /  AST  39  /  ALT  30  /  AlkPhos  70  -21    -21    134<L>  |  104  |  14  ----------------------------<  138<H>  4.0   |  26  |  1.37<H>    Ca    9.7      21 Sep 2020 14:00    TPro  8.1  /  Alb  3.4<L>  /  TBili  0.4  /  DBili  x   /  AST  39  /  ALT  30  /  AlkPhos  70  -  PT/INR - ( 21 Sep 2020 14:00 )   PT: 13.4 sec;   INR: 1.15 ratio    PTT - ( 21 Sep 2020 14:00 )  PTT:30.6 sec      CAPILLARY BLOOD GLUCOSE  POCT Blood Glucose.: 234 mg/dL (21 Sep 2020 17:32)      Urinalysis Basic - ( 21 Sep 2020 14:00 )  Color: Yellow / Appearance: Clear / S.010 / pH: x  Gluc: x / Ketone: Negative  / Bili: Negative / Urobili: Negative   Blood: x / Protein: Negative / Nitrite: Positive   Leuk Esterase: Moderate / RBC: 2-5 /HPF / WBC >50 /HPF   Sq Epi: x / Non Sq Epi: Moderate /HPF / Bacteria: Many /HPF        MEDICATIONS  (STANDING):    atorvastatin 80 milliGRAM(s) Oral at bedtime  cyanocobalamin Injectable 1000 MICROGram(s) IntraMuscular once  ergocalciferol 38466 Unit(s) Oral <User Schedule>  heparin   Injectable 5000 Unit(s) SubCutaneous every 8 hours  insulin glargine Injectable (LANTUS) 40 Unit(s) SubCutaneous at bedtime  insulin lispro (HumaLOG) corrective regimen sliding scale   SubCutaneous three times a day before meals  insulin lispro Injectable (HumaLOG) 10 Unit(s) SubCutaneous three times a day before meals  levothyroxine 88 MICROGram(s) Oral <User Schedule>  meropenem  IVPB 1000 milliGRAM(s) IV Intermittent every 8 hours  sodium chloride 0.9%. 1000 milliLiter(s) (75 mL/Hr) IV Continuous <Continuous>  Triglide (fenofibrate) 160 milliGRAM(s) 160 milliGRAM(s) Oral daily  vancomycin  IVPB 1250 milliGRAM(s) IV Intermittent every 12 hours        RADIOLOGY & ADDITIONAL TESTS:    20 : CT Abdomen and Pelvis No Cont (20 @ 21:21) 5 mm right middle lobe pulmonarynodule. No parenchymal consolidation or pleural effusion.    Left supraclavicular lymphadenopathy.    2.2 x 1.8 cm ovoid right posterior breast lesion, with associated surgical clip. Correlate with mammographic and postsurgical results.    Hypodensity within the pancreatic body as well as ill-defined and enlargement of the pancreatic head, concerning for underlying lesions and may correspond to patient's known pancreatic metastatic disease. Correlation with prior outside imaging, if available is recommended to evaluate for interval change. Evaluation is limited without the administration of intravenous contrast material.    Retroperitoneal lymphadenopathy.    Left nephrectomy. No nuvia right renal hydronephrosis. Evaluation is limited by motion.    2.3 x 1.8 cm posterior ovoid right breast lesion, with associated surgical clip. Correlate with mammographic findings and/or postsurgical results.          20: CT Chest No Cont (20 @ 21:21) LUNGS AND LARGE AIRWAYS: Patent central airways. 5 mm right middle lobe pulmonary nodule (2:87) evaluation of the lung parenchyma is limited by significantrespiratory motion.  PLEURA: No pleural effusion.    VESSELS: Within normal limits.            20 : Xray Chest 1 View-PORTABLE IMMEDIATE (20 @ 13:41) : No evidence for pulmonary consolidation, pleural effusion or pneumothorax.    Small left basilar atelectasis.  The trachea is midline.   The cardiac silhouette is magnified by AP technique.        MICROBIOLOGY DATA:    Respiratory Viral/Bacti Detection by RUTHY (20 @ 20:56)   Rapid RVP Result: NotDetec   COVID-19 PCR . (20 @ 14:00)   COVID-19 PCR: NotDetec: Testing is performed using polymerase chain reaction (PCR) or   transcription mediated amplification (TMA).    Culture - Urine (20 @ 18:57)   Specimen Source: .Urine Clean Catch (Midstream)   Culture Results:   >100,000 CFU/ml Escherichia coli

## 2020-09-22 NOTE — CONSULT NOTE ADULT - ASSESSMENT
61 y/o F from home, living alone with CDPAP program (mohamud Collins is a caregiver 5hrs/ day 5days/wk)  with a significant PMHx of HTN, DM and metastatic renal CA (since 2008, mets to breast and pancreas, on chemo x about a year every 2 weeks),  PSH of Left kidney removal in 2008 and bladder reconstruction in 2008, hysterectomy presents to the ED with nonproductive cough x 4 days associated with fever and chills. Admitted for Sepsis secondary to UTI.     Endocrinology service was consulted given uncontrolled blood sugars during hospital stay.     1. DM with uncontrolled blood sugar:         2. Hypothyroidism: 59 y/o F from home, living alone with CDPAP program (mohamud Collins is a caregiver 5hrs/ day 5days/wk)  with a significant PMHx of HTN, DM and metastatic renal CA (since 2008, mets to breast and pancreas, on chemo x about a year every 2 weeks),  PSH of Left kidney removal in 2008 and bladder reconstruction in 2008, hysterectomy presents to the ED with nonproductive cough x 4 days associated with fever and chills. Admitted for Sepsis secondary to UTI.     Endocrinology service was consulted given uncontrolled blood sugars during hospital stay.     1. DM with uncontrolled blood sugar:   -Patient has hx of DM, takes Basaglar 65 units at night, Admelog 20 in morning, 28 afternoon and 22 in evening at home.   -HbA1C: 7.5%  -At present, blood sugars in >230   -Will c/w Humalog 10 units TID and increase Lantus to 40 units   -C/w moderate HSS    2. Hypothyroidism:    -Patient has hx of hypothyroidism, on levothyroxine   TSH mildly elevated to 6.34    Will get Free T4 59 y/o F from home, living alone with CDPAP program (mohamud Collins is a caregiver 5hrs/ day 5days/wk)  with a significant PMHx of HTN, DM and metastatic renal CA (since 2008, mets to breast and pancreas, on chemo x about a year every 2 weeks),  PSH of Left kidney removal in 2008 and bladder reconstruction in 2008, hysterectomy presents to the ED with nonproductive cough x 4 days associated with fever and chills. Admitted for Sepsis secondary to UTI.     Endocrinology service was consulted given uncontrolled blood sugars during hospital stay.     1. DM with uncontrolled blood sugar:   -Patient has hx of DM, takes Basaglar 65 units at night, Admelog 20 in morning, 28 afternoon and 22 in evening at home.   with occasional hypoglycemia  -HbA1C: 7.5%  -At present, blood sugars in >230   -Will c/w Humalog 10 units TID and increase Lantus to 40 units   -C/w moderate HSS    2. Hypothyroidism:    -Patient has hx of hypothyroidism, on levothyroxine   TSH mildly elevated to 6.34    Will get Free T4 and adjust lt4 dose

## 2020-09-22 NOTE — CONSULT NOTE ADULT - SUBJECTIVE AND OBJECTIVE BOX
Patient is a 60y old  Female who presents with a chief complaint of sepsis (22 Sep 2020 12:08)      HPI:  59 y/o F from home, living alone with CDPAP program (niece Karina is a caregiver 5hrs/ day 5days/wk)  with a significant PMHx of HTN, DM and metastatic renal CA (since , mets to breast and pancreas, on chemo x about a year every 2 weeks),  PSH of Left kidney removal in  and bladder reconstruction in , hysterectomy presents to the ED with nonproductive cough x 4 days associated with fever and chills. Patient's last chemo was 2 weeks ago. Patient seen by PMD Dr. Valdivia today, noted for fever 103F and was sent in for further evaluation. Patient relates to history of PNA 2 years ago. Reports slight decreased appetite with no nausea, vomiting, diarrhea or shortness of breath. Accompanied by her niece. Pt lost weight about 20 lbs in 6 months.    In ED;   , fever 102F, cefepime and vanco, NS bolus 1.7L was given. UA positive, CXR shows L basilar atelectasis, no infiltration.    GOC: FULL CODE (21 Sep 2020 16:58)    ENDO Hx: Endo service was consulted as patient's blood sugars were noted to be >230's during hospital stay and TSH was 6.34. Patient reports taking Basaglar 65 units HS and Amelog 20 in morning, 28 in afternoon and 22 at night. She also takes metformin 500mg BID and reports being compliant to her medications. She says that her blood sugars are very labile at home and range btw 180-200 and at times drops as low as 60. She has a Dexacom gb device placed.   Her HbA1C is 7.5 and she has been on Lantus 32 units, Humalog 10 units TID along with moderate HSS received 10 units so far)     PAST MEDICAL & SURGICAL HISTORY:  Renal cancer    DM (diabetes mellitus)    HTN (hypertension)    H/O bladder repair surgery    History of kidney surgery    S/P hysterectomy           MEDICATIONS  (STANDING):  atorvastatin 80 milliGRAM(s) Oral at bedtime  ergocalciferol 19650 Unit(s) Oral <User Schedule>  heparin   Injectable 5000 Unit(s) SubCutaneous every 8 hours  insulin glargine Injectable (LANTUS) 32 Unit(s) SubCutaneous at bedtime  insulin lispro (HumaLOG) corrective regimen sliding scale   SubCutaneous three times a day before meals  insulin lispro Injectable (HumaLOG) 10 Unit(s) SubCutaneous three times a day before meals  levothyroxine 88 MICROGram(s) Oral <User Schedule>  meropenem  IVPB 1000 milliGRAM(s) IV Intermittent every 8 hours  sodium chloride 0.9%. 1000 milliLiter(s) (75 mL/Hr) IV Continuous <Continuous>  Triglide (fenofibrate) 160 milliGRAM(s) 160 milliGRAM(s) Oral daily  vancomycin  IVPB 1250 milliGRAM(s) IV Intermittent every 12 hours    MEDICATIONS  (PRN):  acetaminophen   Tablet .. 650 milliGRAM(s) Oral every 6 hours PRN Temp greater or equal to 38C (100.4F)          REVIEW OF SYSTEMS:  CONSTITUTIONAL: No fever, weight loss, or fatigue  EYES: No eye pain, visual disturbances, or discharge  ENT:  No difficulty hearing, tinnitus, vertigo; No sinus or throat pain  NECK: No pain or stiffness  RESPIRATORY: No cough, wheezing, chills or hemoptysis; No Shortness of Breath  CARDIOVASCULAR: No chest pain, palpitations, passing out, dizziness, or leg swelling  GASTROINTESTINAL: No abdominal or epigastric pain. No nausea, vomiting, or hematemesis; No diarrhea or constipation. No melena or hematochezia.  GENITOURINARY: No dysuria, frequency, hematuria, or incontinence  NEUROLOGICAL: No headaches, memory loss, loss of strength, numbness, or tremors  SKIN: No itching, burning, rashes, or lesions   LYMPH Nodes: No enlarged glands  ENDOCRINE: No heat or cold intolerance; No hair loss  MUSCULOSKELETAL: No joint pain or swelling; No muscle, back, or extremity pain  PSYCHIATRIC: No depression, anxiety, mood swings, or difficulty sleeping  HEME/LYMPH: No easy bruising, or bleeding gums  ALLERGY AND IMMUNOLOGIC: No hives or eczema	        Vital Signs Last 24 Hrs  T(C): 36.8 (22 Sep 2020 12:12), Max: 39.2 (22 Sep 2020 11:00)  T(F): 98.3 (22 Sep 2020 12:12), Max: 102.5 (22 Sep 2020 11:00)  HR: 100 (22 Sep 2020 12:12) (92 - 102)  BP: 153/59 (22 Sep 2020 12:12) (102/41 - 154/70)  BP(mean): 72 (21 Sep 2020 19:00) (72 - 72)  RR: 17 (22 Sep 2020 12:12) (17 - 18)  SpO2: 100% (22 Sep 2020 12:12) (96% - 100%)      Constitutional: well appearing female in no apparent distress       Neck:  No JVD, bruits or thyromegaly    Respiratory:  Clear without rales or rhonchi    Cardiovascular:  RR without murmur, rub or gallop.    Gastrointestinal: Soft without hepatosplenomegaly.    Extremities: without cyanosis, clubbing or edema.    Neurological:  Oriented   x      . No gross sensory or motor defects.        LABS:                        10.2   4.08  )-----------( 162      ( 22 Sep 2020 06:17 )             30.1     09-22    138  |  109<H>  |  14  ----------------------------<  281<H>  3.8   |  23  |  1.28    Ca    8.7      22 Sep 2020 06:17  Phos  2.1     -22  Mg     1.6     -22    TPro  8.1  /  Alb  3.4<L>  /  TBili  0.4  /  DBili  x   /  AST  39  /  ALT  30  /  AlkPhos  70  09-21        PT/INR - ( 21 Sep 2020 14:00 )   PT: 13.4 sec;   INR: 1.15 ratio         PTT - ( 21 Sep 2020 14:00 )  PTT:30.6 sec  Urinalysis Basic - ( 21 Sep 2020 14:00 )    Color: Yellow / Appearance: Clear / S.010 / pH: x  Gluc: x / Ketone: Negative  / Bili: Negative / Urobili: Negative   Blood: x / Protein: Negative / Nitrite: Positive   Leuk Esterase: Moderate / RBC: 2-5 /HPF / WBC >50 /HPF   Sq Epi: x / Non Sq Epi: Moderate /HPF / Bacteria: Many /HPF      CAPILLARY BLOOD GLUCOSE      POCT Blood Glucose.: 242 mg/dL (22 Sep 2020 07:28)  POCT Blood Glucose.: 295 mg/dL (21 Sep 2020 21:55)  POCT Blood Glucose.: 234 mg/dL (21 Sep 2020 17:32)      RADIOLOGY & ADDITIONAL STUDIES:

## 2020-09-23 ENCOUNTER — TRANSCRIPTION ENCOUNTER (OUTPATIENT)
Age: 60
End: 2020-09-23

## 2020-09-23 DIAGNOSIS — Z02.9 ENCOUNTER FOR ADMINISTRATIVE EXAMINATIONS, UNSPECIFIED: ICD-10-CM

## 2020-09-23 DIAGNOSIS — Z71.89 OTHER SPECIFIED COUNSELING: ICD-10-CM

## 2020-09-23 LAB
-  AMIKACIN: SIGNIFICANT CHANGE UP
-  AMOXICILLIN/CLAVULANIC ACID: SIGNIFICANT CHANGE UP
-  AMPICILLIN/SULBACTAM: SIGNIFICANT CHANGE UP
-  AMPICILLIN: SIGNIFICANT CHANGE UP
-  AZTREONAM: SIGNIFICANT CHANGE UP
-  CEFAZOLIN: SIGNIFICANT CHANGE UP
-  CEFEPIME: SIGNIFICANT CHANGE UP
-  CEFOXITIN: SIGNIFICANT CHANGE UP
-  CEFTRIAXONE: SIGNIFICANT CHANGE UP
-  CIPROFLOXACIN: SIGNIFICANT CHANGE UP
-  ERTAPENEM: SIGNIFICANT CHANGE UP
-  GENTAMICIN: SIGNIFICANT CHANGE UP
-  IMIPENEM: SIGNIFICANT CHANGE UP
-  LEVOFLOXACIN: SIGNIFICANT CHANGE UP
-  MEROPENEM: SIGNIFICANT CHANGE UP
-  NITROFURANTOIN: SIGNIFICANT CHANGE UP
-  PIPERACILLIN/TAZOBACTAM: SIGNIFICANT CHANGE UP
-  TIGECYCLINE: SIGNIFICANT CHANGE UP
-  TOBRAMYCIN: SIGNIFICANT CHANGE UP
-  TRIMETHOPRIM/SULFAMETHOXAZOLE: SIGNIFICANT CHANGE UP
A1C WITH ESTIMATED AVERAGE GLUCOSE RESULT: 7.6 % — HIGH (ref 4–5.6)
ANION GAP SERPL CALC-SCNC: 9 MMOL/L — SIGNIFICANT CHANGE UP (ref 5–17)
BUN SERPL-MCNC: 12 MG/DL — SIGNIFICANT CHANGE UP (ref 7–18)
CALCIUM SERPL-MCNC: 8.8 MG/DL — SIGNIFICANT CHANGE UP (ref 8.4–10.5)
CHLORIDE SERPL-SCNC: 105 MMOL/L — SIGNIFICANT CHANGE UP (ref 96–108)
CO2 SERPL-SCNC: 21 MMOL/L — LOW (ref 22–31)
CREAT SERPL-MCNC: 1.26 MG/DL — SIGNIFICANT CHANGE UP (ref 0.5–1.3)
CULTURE RESULTS: SIGNIFICANT CHANGE UP
ESTIMATED AVERAGE GLUCOSE: 171 MG/DL — HIGH (ref 68–114)
FERRITIN SERPL-MCNC: 449 NG/ML — HIGH (ref 15–150)
FOLATE SERPL-MCNC: >20 NG/ML — SIGNIFICANT CHANGE UP
GLUCOSE BLDC GLUCOMTR-MCNC: 225 MG/DL — HIGH (ref 70–99)
GLUCOSE BLDC GLUCOMTR-MCNC: 243 MG/DL — HIGH (ref 70–99)
GLUCOSE BLDC GLUCOMTR-MCNC: 329 MG/DL — HIGH (ref 70–99)
GLUCOSE BLDC GLUCOMTR-MCNC: 364 MG/DL — HIGH (ref 70–99)
GLUCOSE SERPL-MCNC: 271 MG/DL — HIGH (ref 70–99)
HCT VFR BLD CALC: 32.9 % — LOW (ref 34.5–45)
HGB BLD-MCNC: 10.4 G/DL — LOW (ref 11.5–15.5)
IRON SATN MFR SERPL: 15 UG/DL — LOW (ref 40–150)
IRON SATN MFR SERPL: 5 % — LOW (ref 15–50)
MCHC RBC-ENTMCNC: 26.9 PG — LOW (ref 27–34)
MCHC RBC-ENTMCNC: 31.6 GM/DL — LOW (ref 32–36)
MCV RBC AUTO: 85 FL — SIGNIFICANT CHANGE UP (ref 80–100)
METHOD TYPE: SIGNIFICANT CHANGE UP
NRBC # BLD: 0 /100 WBCS — SIGNIFICANT CHANGE UP (ref 0–0)
ORGANISM # SPEC MICROSCOPIC CNT: SIGNIFICANT CHANGE UP
ORGANISM # SPEC MICROSCOPIC CNT: SIGNIFICANT CHANGE UP
PLATELET # BLD AUTO: 154 K/UL — SIGNIFICANT CHANGE UP (ref 150–400)
POTASSIUM SERPL-MCNC: 4 MMOL/L — SIGNIFICANT CHANGE UP (ref 3.5–5.3)
POTASSIUM SERPL-SCNC: 4 MMOL/L — SIGNIFICANT CHANGE UP (ref 3.5–5.3)
RBC # BLD: 3.87 M/UL — SIGNIFICANT CHANGE UP (ref 3.8–5.2)
RBC # FLD: 13.2 % — SIGNIFICANT CHANGE UP (ref 10.3–14.5)
SODIUM SERPL-SCNC: 135 MMOL/L — SIGNIFICANT CHANGE UP (ref 135–145)
SPECIMEN SOURCE: SIGNIFICANT CHANGE UP
T3 SERPL-MCNC: 54 NG/DL — LOW (ref 80–200)
T4 AB SER-ACNC: 10.1 UG/DL — SIGNIFICANT CHANGE UP (ref 4.6–12)
T4 FREE SERPL-MCNC: 1.2 NG/DL — SIGNIFICANT CHANGE UP (ref 0.9–1.8)
TIBC SERPL-MCNC: 292 UG/DL — SIGNIFICANT CHANGE UP (ref 250–450)
UIBC SERPL-MCNC: 277 UG/DL — SIGNIFICANT CHANGE UP (ref 110–370)
VANCOMYCIN TROUGH SERPL-MCNC: 10.4 UG/ML — SIGNIFICANT CHANGE UP (ref 10–20)
WBC # BLD: 4.79 K/UL — SIGNIFICANT CHANGE UP (ref 3.8–10.5)
WBC # FLD AUTO: 4.79 K/UL — SIGNIFICANT CHANGE UP (ref 3.8–10.5)

## 2020-09-23 RX ORDER — ATORVASTATIN CALCIUM 80 MG/1
40 TABLET, FILM COATED ORAL AT BEDTIME
Refills: 0 | Status: DISCONTINUED | OUTPATIENT
Start: 2020-09-23 | End: 2020-09-26

## 2020-09-23 RX ORDER — INSULIN LISPRO 100/ML
20 VIAL (ML) SUBCUTANEOUS
Qty: 0 | Refills: 0 | DISCHARGE

## 2020-09-23 RX ORDER — FENOFIBRATE,MICRONIZED 130 MG
160 CAPSULE ORAL DAILY
Refills: 0 | Status: DISCONTINUED | OUTPATIENT
Start: 2020-09-23 | End: 2020-09-23

## 2020-09-23 RX ORDER — INSULIN LISPRO 100/ML
14 VIAL (ML) SUBCUTANEOUS
Refills: 0 | Status: DISCONTINUED | OUTPATIENT
Start: 2020-09-23 | End: 2020-09-24

## 2020-09-23 RX ORDER — INSULIN LISPRO 100/ML
22 VIAL (ML) SUBCUTANEOUS
Qty: 0 | Refills: 0 | DISCHARGE

## 2020-09-23 RX ORDER — INSULIN GLARGINE 100 [IU]/ML
65 INJECTION, SOLUTION SUBCUTANEOUS
Qty: 0 | Refills: 0 | DISCHARGE

## 2020-09-23 RX ORDER — BUDESONIDE AND FORMOTEROL FUMARATE DIHYDRATE 160; 4.5 UG/1; UG/1
2 AEROSOL RESPIRATORY (INHALATION)
Refills: 0 | Status: DISCONTINUED | OUTPATIENT
Start: 2020-09-23 | End: 2020-09-26

## 2020-09-23 RX ORDER — PANTOPRAZOLE SODIUM 20 MG/1
40 TABLET, DELAYED RELEASE ORAL
Refills: 0 | Status: DISCONTINUED | OUTPATIENT
Start: 2020-09-23 | End: 2020-09-26

## 2020-09-23 RX ORDER — INSULIN LISPRO 100/ML
28 VIAL (ML) SUBCUTANEOUS
Qty: 0 | Refills: 0 | DISCHARGE

## 2020-09-23 RX ORDER — INSULIN GLARGINE 100 [IU]/ML
50 INJECTION, SOLUTION SUBCUTANEOUS AT BEDTIME
Refills: 0 | Status: DISCONTINUED | OUTPATIENT
Start: 2020-09-23 | End: 2020-09-24

## 2020-09-23 RX ORDER — CEFTRIAXONE 500 MG/1
2000 INJECTION, POWDER, FOR SOLUTION INTRAMUSCULAR; INTRAVENOUS EVERY 24 HOURS
Refills: 0 | Status: DISCONTINUED | OUTPATIENT
Start: 2020-09-23 | End: 2020-09-25

## 2020-09-23 RX ORDER — ATORVASTATIN CALCIUM 80 MG/1
1 TABLET, FILM COATED ORAL
Qty: 0 | Refills: 0 | DISCHARGE

## 2020-09-23 RX ORDER — ALBUTEROL 90 UG/1
2 AEROSOL, METERED ORAL EVERY 6 HOURS
Refills: 0 | Status: DISCONTINUED | OUTPATIENT
Start: 2020-09-23 | End: 2020-09-26

## 2020-09-23 RX ADMIN — Medication 650 MILLIGRAM(S): at 13:30

## 2020-09-23 RX ADMIN — Medication 10 UNIT(S): at 12:03

## 2020-09-23 RX ADMIN — Medication 8: at 08:04

## 2020-09-23 RX ADMIN — Medication 88 MICROGRAM(S): at 05:15

## 2020-09-23 RX ADMIN — HEPARIN SODIUM 5000 UNIT(S): 5000 INJECTION INTRAVENOUS; SUBCUTANEOUS at 15:31

## 2020-09-23 RX ADMIN — Medication 4: at 12:03

## 2020-09-23 RX ADMIN — Medication 4: at 17:25

## 2020-09-23 RX ADMIN — Medication 14 UNIT(S): at 17:25

## 2020-09-23 RX ADMIN — Medication 166.67 MILLIGRAM(S): at 12:04

## 2020-09-23 RX ADMIN — MEROPENEM 100 MILLIGRAM(S): 1 INJECTION INTRAVENOUS at 05:16

## 2020-09-23 RX ADMIN — Medication 166.67 MILLIGRAM(S): at 00:13

## 2020-09-23 RX ADMIN — MEROPENEM 100 MILLIGRAM(S): 1 INJECTION INTRAVENOUS at 15:31

## 2020-09-23 RX ADMIN — ATORVASTATIN CALCIUM 40 MILLIGRAM(S): 80 TABLET, FILM COATED ORAL at 22:04

## 2020-09-23 RX ADMIN — INSULIN GLARGINE 50 UNIT(S): 100 INJECTION, SOLUTION SUBCUTANEOUS at 22:04

## 2020-09-23 RX ADMIN — Medication 10 UNIT(S): at 08:03

## 2020-09-23 RX ADMIN — PANTOPRAZOLE SODIUM 40 MILLIGRAM(S): 20 TABLET, DELAYED RELEASE ORAL at 22:04

## 2020-09-23 RX ADMIN — CEFTRIAXONE 100 MILLIGRAM(S): 500 INJECTION, POWDER, FOR SOLUTION INTRAMUSCULAR; INTRAVENOUS at 22:04

## 2020-09-23 RX ADMIN — BUDESONIDE AND FORMOTEROL FUMARATE DIHYDRATE 2 PUFF(S): 160; 4.5 AEROSOL RESPIRATORY (INHALATION) at 22:04

## 2020-09-23 NOTE — PROGRESS NOTE ADULT - PROBLEM SELECTOR PLAN 4
-HgA1c 7.5  -Blood sugars >230  -Humolog 10 units TID  -Lantus 40 units at night  -Moderate ISS  -Will continue to monitor blood sugar  -Endocrine Dr. Johnson consulted -HgA1c 7.5  -Blood sugars >230  -Humolog 10 units TID  -Lantus 40 units at night  -Moderate ISS  -continue accu checks ACHS  -Endocrine Dr. Johnson consulted

## 2020-09-23 NOTE — PROGRESS NOTE ADULT - SUBJECTIVE AND OBJECTIVE BOX
Patient is a 60y old  Female who presents with a chief complaint of sepsis (23 Sep 2020 06:21)  Awake, alert, comfortable out of bed in NAD. Feeling better    INTERVAL HPI/OVERNIGHT EVENTS:      VITAL SIGNS:  T(F): 97.5 (20 @ 05:18)  HR: 84 (20 @ 05:18)  BP: 129/65 (20 @ 05:18)  RR: 17 (20 @ 05:18)  SpO2: 100% (20 @ 05:18)  Wt(kg): --  I&O's Detail          REVIEW OF SYSTEMS:    CONSTITUTIONAL:  No fevers, chills, sweats    HEENT:  Eyes:  No diplopia or blurred vision. ENT:  No earache, sore throat or runny nose.    CARDIOVASCULAR:  No pressure, squeezing, tightness, or heaviness about the chest; no palpitations.    RESPIRATORY:  Per HPI    GASTROINTESTINAL:  No abdominal pain, nausea, vomiting or diarrhea.    GENITOURINARY:  No dysuria, frequency or urgency.    NEUROLOGIC:  No paresthesias, fasciculations, seizures or weakness.    PSYCHIATRIC:  No disorder of thought or mood.      PHYSICAL EXAM:    Constitutional: Well developed and nourished  Eyes:Perrla  ENMT: normal  Neck:supple  Respiratory: good air entry  Cardiovascular: S1 S2 regular  Gastrointestinal: Soft, Non tender  Extremities: No edema  Vascular:normal  Neurological:Awake, alert,Ox3  Musculoskeletal:Normal      MEDICATIONS  (STANDING):  atorvastatin 80 milliGRAM(s) Oral at bedtime  cyanocobalamin Injectable 1000 MICROGram(s) IntraMuscular once  ergocalciferol 49150 Unit(s) Oral <User Schedule>  heparin   Injectable 5000 Unit(s) SubCutaneous every 8 hours  insulin glargine Injectable (LANTUS) 40 Unit(s) SubCutaneous at bedtime  insulin lispro (HumaLOG) corrective regimen sliding scale   SubCutaneous three times a day before meals  insulin lispro Injectable (HumaLOG) 10 Unit(s) SubCutaneous three times a day before meals  levothyroxine 88 MICROGram(s) Oral <User Schedule>  meropenem  IVPB 1000 milliGRAM(s) IV Intermittent every 8 hours  sodium chloride 0.9%. 1000 milliLiter(s) (75 mL/Hr) IV Continuous <Continuous>  Triglide (fenofibrate) 160 milliGRAM(s) 160 milliGRAM(s) Oral daily  vancomycin  IVPB 1250 milliGRAM(s) IV Intermittent every 12 hours    MEDICATIONS  (PRN):  acetaminophen   Tablet .. 650 milliGRAM(s) Oral every 6 hours PRN Temp greater or equal to 38C (100.4F)      Allergies    sulfa drugs (Hives)    Intolerances        LABS:                        10.4   4.79  )-----------( 154      ( 23 Sep 2020 07:47 )             32.9         135  |  105  |  12  ----------------------------<  271<H>  4.0   |  21<L>  |  1.26    Ca    8.8      23 Sep 2020 07:47  Phos  2.1       Mg     1.6         TPro  8.1  /  Alb  3.4<L>  /  TBili  0.4  /  DBili  x   /  AST  39  /  ALT  30  /  AlkPhos  70  21    PT/INR - ( 21 Sep 2020 14:00 )   PT: 13.4 sec;   INR: 1.15 ratio         PTT - ( 21 Sep 2020 14:00 )  PTT:30.6 sec  Urinalysis Basic - ( 21 Sep 2020 14:00 )    Color: Yellow / Appearance: Clear / S.010 / pH: x  Gluc: x / Ketone: Negative  / Bili: Negative / Urobili: Negative   Blood: x / Protein: Negative / Nitrite: Positive   Leuk Esterase: Moderate / RBC: 2-5 /HPF / WBC >50 /HPF   Sq Epi: x / Non Sq Epi: Moderate /HPF / Bacteria: Many /HPF            CAPILLARY BLOOD GLUCOSE      POCT Blood Glucose.: 329 mg/dL (23 Sep 2020 07:46)  POCT Blood Glucose.: 134 mg/dL (22 Sep 2020 21:58)  POCT Blood Glucose.: 281 mg/dL (22 Sep 2020 16:48)  POCT Blood Glucose.: 272 mg/dL (22 Sep 2020 11:03)        RADIOLOGY & ADDITIONAL TESTS:    CXR:  x< from: CT Chest No Cont (20 @ 21:21) >  5 mm right middle lobe pulmonarynodule. No parenchymal consolidation or pleural effusion.    Left supraclavicular lymphadenopathy.    2.2 x 1.8 cm ovoid right posterior breast lesion, with associated surgical clip. Correlate with mammographic and postsurgical results.    Hypodensity within the pancreatic body as well as ill-defined and enlargement of the pancreatic head, concerning for underlying lesions and may correspond to patient's known pancreatic metastatic disease. Correlation with prior outside imaging, if available is recommended to evaluate for interval change. Evaluation is limited without the administration of intravenous contrast material.    Retroperitoneal lymphadenopathy.    Left nephrectomy. No nuvia right renal hydronephrosis. Evaluation is limited by motion.    2.3 x 1.8 cm posterior ovoid right breast lesion, with associated surgical clip. Correlate with mammographic findings and/or postsurgical results.    < end of copied text >    Ct scan chest:    ekg;    echo:

## 2020-09-23 NOTE — PROGRESS NOTE ADULT - ASSESSMENT
Patient is a 60y old  Female from home, living alone with [employer] program (mohamud Collins is a caregiver 5hrs/ day 5days/wk)  with a significant PMHx of HTN, DM and metastatic renal CA (since 2008, mets to breast and pancreas, on chemo x about a year every 2 weeks),  PSH of Left kidney removal in 2008 and bladder reconstruction in 2008, hysterectomy, now presents to the ER for evaluation of nonproductive cough, fever and chills. Patient's last chemo was 2 weeks ago. Patient seen by PMD Dr. Valdivia today, noted for fever 103F and was sent in for further evaluation. On admission, he found to have fever, tachycardia, positive Urine analysis and negative CXR. She has started on Meropenem and Vancomycin, and the ID consult requested to assist with further evaluation and antibiotic management.     # Sepsis ( Fever + tachycardia )- resolving, still spiking fever   # UTI  - UCX E.coli  # Metastatic Renal cancer- s/p  Left nephrectomy on chemo therapy, last one was about 2 weeks ago  # COVID 19 is negative    would recommend:    1. Monitor Temp. and c/w supportive care  2. Continue  Vancomycin since no evidence of MRSA  3. Change Meropenem to Ceftriaxone since its sensitive to E.coli  4. Aspiration precaution  5. Pain management as needed      Attending Attestation:    Spent more than 45 minutes on total encounter, more than 50 % of the visit was spent counseling and/or coordinating care by the Attending physician. Patient is a 60y old  Female from home, living alone with [employer] program (mohamud Collins is a caregiver 5hrs/ day 5days/wk)  with a significant PMHx of HTN, DM and metastatic renal CA (since 2008, mets to breast and pancreas, on chemo x about a year every 2 weeks),  PSH of Left kidney removal in 2008 and bladder reconstruction in 2008, hysterectomy, now presents to the ER for evaluation of nonproductive cough, fever and chills. Patient's last chemo was 2 weeks ago. Patient seen by PMD Dr. Valdivia today, noted for fever 103F and was sent in for further evaluation. On admission, he found to have fever, tachycardia, positive Urine analysis and negative CXR. She has started on Meropenem and Vancomycin, and the ID consult requested to assist with further evaluation and antibiotic management.     # Sepsis ( Fever + tachycardia )- resolving, still spiking fever   # UTI  - UCX E.coli  # Metastatic Renal cancer- s/p  Left nephrectomy on chemo therapy, last one was about 2 weeks ago  # COVID 19 is negative    would recommend:    1. Monitor Temp. and c/w supportive care  2. Continue  Vancomycin since no evidence of MRSA  3. Change Meropenem to Ceftriaxone since its sensitive to E.coli  4. Aspiration precaution  5. Pain management as needed    d/w patient and Nursing staff    Attending Attestation:    Spent more than 45 minutes on total encounter, more than 50 % of the visit was spent counseling and/or coordinating care by the Attending physician.

## 2020-09-23 NOTE — PROGRESS NOTE ADULT - PROBLEM SELECTOR PLAN 3
-BUN 12/ Creatinine 1.26  -JAX resolved -JAX likely prerenal insetting of dehydration and infection  -JAX resolved  -avoid nephrotoxins

## 2020-09-23 NOTE — PROGRESS NOTE ADULT - SUBJECTIVE AND OBJECTIVE BOX
Patient is a 60y old  Female who presents with a chief complaint of sepsis (22 Sep 2020 19:37)    pt seen in ed [x  ], reg med floor [   ], bed [ x ], chair at bedside [   ], a+o x3 [ x ], lethargic [  ],    nad [ x ]      Allergies    sulfa drugs (Hives)        Vitals    T(F): 97.5 (09-23-20 @ 05:18), Max: 102.5 (09-22-20 @ 11:00)  HR: 84 (09-23-20 @ 05:18) (84 - 103)  BP: 129/65 (09-23-20 @ 05:18) (102/41 - 153/59)  RR: 17 (09-23-20 @ 05:18) (17 - 18)  SpO2: 100% (09-23-20 @ 05:18) (100% - 100%)  Wt(kg): --  CAPILLARY BLOOD GLUCOSE      POCT Blood Glucose.: 134 mg/dL (22 Sep 2020 21:58)      Labs                          10.2   4.08  )-----------( 162      ( 22 Sep 2020 06:17 )             30.1       09-22    138  |  109<H>  |  14  ----------------------------<  281<H>  3.8   |  23  |  1.28    Ca    8.7      22 Sep 2020 06:17  Phos  2.1     09-22  Mg     1.6     09-22    TPro  8.1  /  Alb  3.4<L>  /  TBili  0.4  /  DBili  x   /  AST  39  /  ALT  30  /  AlkPhos  70  09-21            .Blood Blood-Peripheral  09-21 @ 22:06   No growth to date.  --  --      .Urine Clean Catch (Midstream)  09-21 @ 18:57   >100,000 CFU/ml Escherichia coli  --  --          Radiology Results      Meds    MEDICATIONS  (STANDING):  atorvastatin 80 milliGRAM(s) Oral at bedtime  cyanocobalamin Injectable 1000 MICROGram(s) IntraMuscular once  ergocalciferol 08977 Unit(s) Oral <User Schedule>  heparin   Injectable 5000 Unit(s) SubCutaneous every 8 hours  insulin glargine Injectable (LANTUS) 40 Unit(s) SubCutaneous at bedtime  insulin lispro (HumaLOG) corrective regimen sliding scale   SubCutaneous three times a day before meals  insulin lispro Injectable (HumaLOG) 10 Unit(s) SubCutaneous three times a day before meals  levothyroxine 88 MICROGram(s) Oral <User Schedule>  meropenem  IVPB 1000 milliGRAM(s) IV Intermittent every 8 hours  sodium chloride 0.9%. 1000 milliLiter(s) (75 mL/Hr) IV Continuous <Continuous>  Triglide (fenofibrate) 160 milliGRAM(s) 160 milliGRAM(s) Oral daily  vancomycin  IVPB 1250 milliGRAM(s) IV Intermittent every 12 hours      MEDICATIONS  (PRN):  acetaminophen   Tablet .. 650 milliGRAM(s) Oral every 6 hours PRN Temp greater or equal to 38C (100.4F)      Physical Exam    Neuro :  no focal deficits  Respiratory: CTA B/L  CV: RRR, S1S2, no murmurs,   Abdominal: Soft, NT, ND +BS,  Extremities: No edema, + peripheral pulses    ASSESSMENT    Urinary tract infection   sepsis,   acute bronchitis,   pulm nodule  paola,   h/o metastatic renal CA (since 2008, mets to breast and pancreas, on chemo x about a year every 2 weeks),    DM (diabetes mellitus)  HTN (hypertension)  PSH of Left kidney removal in 2008 and bladder reconstruction in 2008,  S/P hysterectomy        PLAN    cont merem and vanco  id f/u   f/u blood and ucx  cont  bronchodilators,   pulm cons  ct chest abd pelv noted above   heme onc cons  cont lantus, humalog actid,   endo cons   hgba1c  cont current med       Patient is a 60y old  Female who presents with a chief complaint of sepsis (22 Sep 2020 19:37)    pt seen in ed [  ], reg med floor [ x  ], bed [ x ], chair at bedside [   ], a+o x3 [ x ], lethargic [  ],    nad [ x ]      Allergies    sulfa drugs (Hives)        Vitals    T(F): 97.5 (09-23-20 @ 05:18), Max: 102.5 (09-22-20 @ 11:00)  HR: 84 (09-23-20 @ 05:18) (84 - 103)  BP: 129/65 (09-23-20 @ 05:18) (102/41 - 153/59)  RR: 17 (09-23-20 @ 05:18) (17 - 18)  SpO2: 100% (09-23-20 @ 05:18) (100% - 100%)  Wt(kg): --  CAPILLARY BLOOD GLUCOSE      POCT Blood Glucose.: 134 mg/dL (22 Sep 2020 21:58)      Labs                          10.2   4.08  )-----------( 162      ( 22 Sep 2020 06:17 )             30.1       09-22    138  |  109<H>  |  14  ----------------------------<  281<H>  3.8   |  23  |  1.28    Ca    8.7      22 Sep 2020 06:17  Phos  2.1     09-22  Mg     1.6     09-22    TPro  8.1  /  Alb  3.4<L>  /  TBili  0.4  /  DBili  x   /  AST  39  /  ALT  30  /  AlkPhos  70  09-21      A1C with Estimated Average Glucose Result: 7.5:      .Blood Blood-Peripheral  09-21 @ 22:06   No growth to date.  --  --      .Urine Clean Catch (Midstream)  09-21 @ 18:57   >100,000 CFU/ml Escherichia coli  --  --          Radiology Results      Meds    MEDICATIONS  (STANDING):  atorvastatin 80 milliGRAM(s) Oral at bedtime  cyanocobalamin Injectable 1000 MICROGram(s) IntraMuscular once  ergocalciferol 41618 Unit(s) Oral <User Schedule>  heparin   Injectable 5000 Unit(s) SubCutaneous every 8 hours  insulin glargine Injectable (LANTUS) 40 Unit(s) SubCutaneous at bedtime  insulin lispro (HumaLOG) corrective regimen sliding scale   SubCutaneous three times a day before meals  insulin lispro Injectable (HumaLOG) 10 Unit(s) SubCutaneous three times a day before meals  levothyroxine 88 MICROGram(s) Oral <User Schedule>  meropenem  IVPB 1000 milliGRAM(s) IV Intermittent every 8 hours  sodium chloride 0.9%. 1000 milliLiter(s) (75 mL/Hr) IV Continuous <Continuous>  Triglide (fenofibrate) 160 milliGRAM(s) 160 milliGRAM(s) Oral daily  vancomycin  IVPB 1250 milliGRAM(s) IV Intermittent every 12 hours      MEDICATIONS  (PRN):  acetaminophen   Tablet .. 650 milliGRAM(s) Oral every 6 hours PRN Temp greater or equal to 38C (100.4F)      Physical Exam    Neuro :  no focal deficits  Respiratory: CTA B/L  CV: RRR, S1S2, no murmurs,   Abdominal: Soft, NT, ND +BS,  Extremities: No edema, + peripheral pulses    ASSESSMENT    Urinary tract infection   sepsis,   acute bronchitis,   pulm nodule  s/p paola,   h/o metastatic renal CA (since 2008, mets to breast and pancreas, on chemo x about a year every 2 weeks),    DM (diabetes mellitus)  HTN (hypertension)  PSH of Left kidney removal in 2008 and bladder reconstruction in 2008,  S/P hysterectomy        PLAN    cont merem and vanco  id f/u   blood cx neg noted above   ucx with e coli noted above  cont  bronchodilators,   pulm f/u   symbicort 80/4.5 mcgs 2 puffs po BID  pfts as OP.   ct chest abd pelv noted    heme onc f/u   s/p sutent and currently on immunotherapy with Nivo/Ipi  tx last given 9/10/20  Hold all Onc tx during admission  pt denies diarrhea/SOB  Pulse Ox 100% on RA  CT c/w known breast and pancreatic masses Bx proven RCC  upon discharge pt to f/u with Oncologist Dr. Pelaez  supplement vit b12  hgba1c 7.5 noted above  endo f/u   cont  Humalog 10 units TID and increase Lantus to 40 units   serum creat wnl  cont current med

## 2020-09-23 NOTE — PROGRESS NOTE ADULT - PROBLEM SELECTOR PLAN 7
-will hold home dose enalapril, will resume once patient is more stable. -Enalapril held on admission on JAX and sepsis  -restart as needed

## 2020-09-23 NOTE — PROGRESS NOTE ADULT - SUBJECTIVE AND OBJECTIVE BOX
Patient is seen and examined at the bed side, is afebrile now, spiked fever again. The Blood cultures have no growth to date  and the Urine culture grew E.coli, intermediate resistant to Levaquin and Cipro.        REVIEW OF SYSTEMS: All other review systems are negative        ALLERGIES: sulfa drugs (Hives)        Vital Signs Last 24 Hrs  T(C): 37.1 (23 Sep 2020 17:29), Max: 38.6 (22 Sep 2020 22:15)  T(F): 98.7 (23 Sep 2020 17:29), Max: 101.4 (22 Sep 2020 22:15)  HR: 102 (23 Sep 2020 14:40) (84 - 102)  BP: 148/59 (23 Sep 2020 14:40) (129/65 - 148/59)  BP(mean): --  RR: 17 (23 Sep 2020 14:40) (17 - 18)  SpO2: 100% (23 Sep 2020 14:40) (100% - 100%)        PHYSICAL EXAM:  GENERAL: Not in distress   CHEST/LUNG: Not using accessory muscles  HEART: s1 and s2 present  ABDOMEN:  Nontender and  Nondistended  EXTREMITIES: No pedal  edema  CNS: Awake and Alert        LABS:                        10.4   4.79  )-----------( 154      ( 23 Sep 2020 07:47 )             32.9                           10.2   4.08  )-----------( 162      ( 22 Sep 2020 06:17 )             30.1           09-23    135  |  105  |  12  ----------------------------<  271<H>  4.0   |  21<L>  |  1.26    Ca    8.8      23 Sep 2020 07:47  Phos  2.1     09-22  Mg     1.6     09-22        09-21    134<L>  |  104  |  14  ----------------------------<  138<H>  4.0   |  26  |  1.37<H>    Ca    9.7      21 Sep 2020 14:00    TPro  8.1  /  Alb  3.4<L>  /  TBili  0.4  /  DBili  x   /  AST  39  /  ALT  30  /  AlkPhos  70  09-21  PT/INR - ( 21 Sep 2020 14:00 )   PT: 13.4 sec;   INR: 1.15 ratio    PTT - ( 21 Sep 2020 14:00 )  PTT:30.6 sec      CAPILLARY BLOOD GLUCOSE  POCT Blood Glucose.: 234 mg/dL (21 Sep 2020 17:32)      Urinalysis Basic - ( 21 Sep 2020 14:00 )  Color: Yellow / Appearance: Clear / S.010 / pH: x  Gluc: x / Ketone: Negative  / Bili: Negative / Urobili: Negative   Blood: x / Protein: Negative / Nitrite: Positive   Leuk Esterase: Moderate / RBC: 2-5 /HPF / WBC >50 /HPF   Sq Epi: x / Non Sq Epi: Moderate /HPF / Bacteria: Many /HPF        MEDICATIONS  (STANDING):    atorvastatin 40 milliGRAM(s) Oral at bedtime  budesonide  80 MICROgram(s)/formoterol 4.5 MICROgram(s) Inhaler 2 Puff(s) Inhalation two times a day  cyanocobalamin Injectable 1000 MICROGram(s) IntraMuscular once  ergocalciferol 35348 Unit(s) Oral <User Schedule>  heparin   Injectable 5000 Unit(s) SubCutaneous every 8 hours  insulin glargine Injectable (LANTUS) 50 Unit(s) SubCutaneous at bedtime  insulin lispro (HumaLOG) corrective regimen sliding scale   SubCutaneous three times a day before meals  insulin lispro Injectable (HumaLOG) 14 Unit(s) SubCutaneous three times a day before meals  levothyroxine 88 MICROGram(s) Oral <User Schedule>  meropenem  IVPB 1000 milliGRAM(s) IV Intermittent every 8 hours  pantoprazole    Tablet 40 milliGRAM(s) Oral before breakfast  sodium chloride 0.9%. 1000 milliLiter(s) (75 mL/Hr) IV Continuous <Continuous>  Triglide (fenofibrate) 160 milliGRAM(s) 160 milliGRAM(s) Oral daily  vancomycin  IVPB 1250 milliGRAM(s) IV Intermittent every 12 hours        RADIOLOGY & ADDITIONAL TESTS:    20 : CT Abdomen and Pelvis No Cont (20 @ 21:21) 5 mm right middle lobe pulmonary nodule. No parenchymal consolidation or pleural effusion.    Left supraclavicular lymphadenopathy.    2.2 x 1.8 cm ovoid right posterior breast lesion, with associated surgical clip. Correlate with mammographic and postsurgical results.    Hypodensity within the pancreatic body as well as ill-defined and enlargement of the pancreatic head, concerning for underlying lesions and may correspond to patient's known pancreatic metastatic disease. Correlation with prior outside imaging, if available is recommended to evaluate for interval change. Evaluation is limited without the administration of intravenous contrast material.    Retroperitoneal lymphadenopathy.    Left nephrectomy. No nuvia right renal hydronephrosis. Evaluation is limited by motion.    2.3 x 1.8 cm posterior ovoid right breast lesion, with associated surgical clip. Correlate with mammographic findings and/or postsurgical results.          20: CT Chest No Cont (20 @ 21:21) LUNGS AND LARGE AIRWAYS: Patent central airways. 5 mm right middle lobe pulmonary nodule (2:87) evaluation of the lung parenchyma is limited by significantrespiratory motion.  PLEURA: No pleural effusion.    VESSELS: Within normal limits.      20 : Xray Chest 1 View-PORTABLE IMMEDIATE (20 @ 13:41) : No evidence for pulmonary consolidation, pleural effusion or pneumothorax.    Small left basilar atelectasis.  The trachea is midline.   The cardiac silhouette is magnified by AP technique.        MICROBIOLOGY DATA:      Culture - Blood (20 @ 22:06)   Specimen Source: .Blood Blood-Peripheral   Culture Results:   No growth to date.     Culture - Blood (20 @ 22:06)   Specimen Source: .Blood Blood-Peripheral   Culture Results:   No growth to date.     Culture - Urine (20 @ 18:57)   - Amikacin: S <=16   - Amoxicillin/Clavulanic Acid: S <=8/4   - Ampicillin: S <=8 These ampicillin results predict results for amoxicillin   - Ampicillin/Sulbactam: S <=4/2 Enterobacter, Citrobacter, and Serratia may develop resistance during prolonged therapy (3-4 days)   - Aztreonam: S <=4   - Cefazolin: S <=2 ([id number]_[id number]) For uncomplicated UTI with K. pneumoniae, E. coli, or P. mirablis: STONEY <=16 is sensitive and STONEY >=32 is resistant. This also predicts results for oral agents cefaclor, cefdinir, cefpodoxime, cefprozil, cefuroxime axetil, cephalexin and locarbef for uncomplicated UTI. Note that some isolates may be susceptible to these agents while testing resistant to cefazolin.   - Cefepime: S <=2   - Cefoxitin: S <=8   - Ceftriaxone: S <=1 Enterobacter, Citrobacter, and Serratia may develop resistance during prolonged therapy   - Ciprofloxacin: I 0.5   - Ertapenem: S <=0.5   - Gentamicin: S <=2   - Imipenem: S <=1   - Levofloxacin: I 1   - Meropenem: S <=1   - Nitrofurantoin: S <=32 Should not be used to treat pyelonephritis   - Piperacillin/Tazobactam: S <=8   - Tigecycline: S <=2   - Tobramycin: S <=2   - Trimethoprim/Sulfamethoxazole: R >   Specimen Source: .Urine Clean Catch (Midstream)   Culture Results:   >100,000 CFU/ml Escherichia coli   Organism Identification: Escherichia coli   Organism: Escherichia coli   Method Type: STONEY   Respiratory Viral/Bacti Detection by RUTHY (20 @ 20:56)   Rapid RVP Result: NotDetec   COVID-19 PCR . (20 @ 14:00)   COVID-19 PCR: NotDetec: Testing is performed using polymerase chain reaction (PCR) or   transcription mediated amplification (TMA).    Culture - Urine (20 @ 18:57)   Specimen Source: .Urine Clean Catch (Midstream)   Culture Results:   >100,000 CFU/ml Escherichia coli

## 2020-09-23 NOTE — PROGRESS NOTE ADULT - PROBLEM SELECTOR PLAN 2
-Patient Renal CA with mets to breast and pancreas, last chemo dose on 9.10.20.  -Hold on chemotherapy  -Hem Onc Dr. Canela consulted -Patient Renal CA with mets to breast and pancreas, last chemo dose on 9.10.20.  -Hold on chemotherapy during admission  -Patient will follow with Dr. Pelaez on discharge  -Hem Onc Dr. Canela consulted

## 2020-09-23 NOTE — PROGRESS NOTE ADULT - SUBJECTIVE AND OBJECTIVE BOX
Interval Events: Patient was started on Lantus 40 units and Humalog 10 units TID yesterday along with moderate HSS. Her blood sugar was noted to be elevated to  329 this morning. Patient reports having soup around 6pm last night and no additional meals.     Allergies    sulfa drugs (Hives)    Intolerances      Endocrine/Metabolic Medications:  atorvastatin 80 milliGRAM(s) Oral at bedtime  insulin glargine Injectable (LANTUS) 40 Unit(s) SubCutaneous at bedtime  insulin lispro (HumaLOG) corrective regimen sliding scale   SubCutaneous three times a day before meals  insulin lispro Injectable (HumaLOG) 10 Unit(s) SubCutaneous three times a day before meals  levothyroxine 88 MICROGram(s) Oral <User Schedule>      Vital Signs Last 24 Hrs  T(C): 36.4 (23 Sep 2020 05:18), Max: 39.2 (22 Sep 2020 11:00)  T(F): 97.5 (23 Sep 2020 05:18), Max: 102.5 (22 Sep 2020 11:00)  HR: 84 (23 Sep 2020 05:18) (84 - 103)  BP: 129/65 (23 Sep 2020 05:18) (129/65 - 153/59)  BP(mean): --  RR: 17 (23 Sep 2020 05:18) (17 - 18)  SpO2: 100% (23 Sep 2020 05:18) (100% - 100%)      PHYSICAL EXAM  All physical exam findings normal, except those marked:  General:	Alert, active, cooperative, NAD, well hydrated  .		[] Abnormal:  Neck		Normal: supple, no cervical adenopathy, no palpable thyroid  .		[] Abnormal:  Cardiovascular	Normal: regular rate, normal S1, S2, no murmurs  .		[] Abnormal:  Respiratory	Normal: no chest wall deformity, normal respiratory pattern, CTA B/L  .		[] Abnormal:  Abdominal	Normal: soft, ND, NT, bowel sounds present, no masses, no organomegaly  .		[] Abnormal:  		Normal normal genitalia  .	  .		[] Abnormal:  Extremities	Normal: FROM x4  .		[] Abnormal:  Skin		Normal: intact and not indurated, no rash, no acanthosis nigricans  .		[] Abnormal:  Neurologic	Normal: grossly intact  .		[] Abnormal:    LABS                        10.4   4.79  )-----------( 154      ( 23 Sep 2020 07:47 )             32.9                               135    |  105    |  12                  Calcium: 8.8   / iCa: x      (09-23 @ 07:47)    ----------------------------<  271       Magnesium: x                                4.0     |  21     |  1.26             Phosphorous: x          CAPILLARY BLOOD GLUCOSE      POCT Blood Glucose.: 329 mg/dL (23 Sep 2020 07:46)  POCT Blood Glucose.: 134 mg/dL (22 Sep 2020 21:58)  POCT Blood Glucose.: 281 mg/dL (22 Sep 2020 16:48)  POCT Blood Glucose.: 272 mg/dL (22 Sep 2020 11:03)

## 2020-09-23 NOTE — PROGRESS NOTE ADULT - ASSESSMENT
Assessment and Recommendation:   · Assessment	  OncHx: Pt well-known to our practice and follows with Oncologist Dr. Pelaez. Pt was diagnosed with Renal Cell Carcinoma in 2008, s/p left nephrectomy the same year. She then developed a pancreatic and a breast lesion both confirmed to be metastatic RCC and the patient was started on sutent. In 11/2016 her RCC was in remission and sutent was discontinued. in 05/2019 she developed another breast mass and the bx was c/w RCC and at that time started on Nivo/Ipi. She developed B/L breast lesions in 08/2020 and Bx confirmed met RCC, pt continued on Nivo/Ipi, last given 09/10/20    Problem# Metastatic RCC Dx 2008  s/p  Left nephrectomy  s/p sutent and currently on immunotherapy with Nivo/Ipi  tx last given 9/10/20  Hold all Onc tx during admission  pt denies diarrhea/SOB  Pulse Ox 100% on RA  CT c/w known breast and pancreatic masses Bx proven RCC  upon discharge pt to f/u with Oncologist Dr. Pelaez    Problem# Anemia  Hgb stable  etiology JAX/Malignancy/B12 deficiency  anemia panel ordered  B12 low at 338  TSH=6.3  Welettne=986 with Trans sat 5%  MM w/u ordered  Rec's:     Venofer 200mg IV qd x 4d (can complete pending doses as an outpt when pt ready for discharge)    daily CBC    B12 1,000mcg IM x 1      Problem# Sepsis  p/w fever 102F , ,,lactate 1.3  WBC & ANC WNL  low-grade temp today  UA +  - Likely 2/2 UTI vs fever malignancy  on meropenem + vanco till urine cx comes back   UCx + e-coli and BCx (-)  ID following    Problem# VTE Prophylaxis  continue Heparin SC       Thank you, will continue to monitor the patient.  Please call with any questions [phone]  Above reviewed with Attending Dr. Montenegro

## 2020-09-23 NOTE — PROGRESS NOTE ADULT - PROBLEM SELECTOR PLAN 5
-not in exacerbation  -Budesonide 40/4.5 mcgs 2 puffs po BID  -Albuterol PRN  -Pulmonology Dr. Fierro consulted

## 2020-09-23 NOTE — DISCHARGE NOTE PROVIDER - NSDCCPCAREPLAN_GEN_ALL_CORE_FT
PRINCIPAL DISCHARGE DIAGNOSIS  Diagnosis: UTI (urinary tract infection)  Assessment and Plan of Treatment: You were admitted with sepsis due to UTI. Your urine culture grew E. Coli. You were treated with IV antibiotics. Please complete your course with oral antibiotics as an outpatient.  Seek immediate medical attention if you develop fevers, chills, burning, pain, frequency, or urgency upon urination. Make sure you stay well hydrated, empty your bladder often, and wipe front to back.  Follow up with your primary care doctor within one week.      SECONDARY DISCHARGE DIAGNOSES  Diagnosis: Renal cancer  Assessment and Plan of Treatment: Follow up with your oncologist Dr. Pelaez upon discharge for further treatment and care.    Diagnosis: Asthma  Assessment and Plan of Treatment: Asthma    Diagnosis: JAX (acute kidney injury)  Assessment and Plan of Treatment: You were found to have abnormal kidney function. This was likley due to your infection. It is now back to normal. Please avoid medications that can further harm your kidneys such as IV contrast and NSAIDs (ibuprofen/advil, aleve, etc.). Follow up with your primary care doctor for routine monitoring of your kidney function.    Diagnosis: DM (diabetes mellitus)  Assessment and Plan of Treatment: You were seen by endocrine who adjusted your diabetes medications. Continue to check your blood glucose before meals and at bedtime. Continue to take your diabetes medications as prescribed. Follow up with your endocrinologist or PMD.     PRINCIPAL DISCHARGE DIAGNOSIS  Diagnosis: UTI (urinary tract infection)  Assessment and Plan of Treatment: You were admitted with sepsis due to UTI. Your urine culture grew E. Coli. You were treated with IV antibiotics. Please complete your course with oral antibiotics as an outpatient.  Seek immediate medical attention if you develop fevers, chills, burning, pain, frequency, or urgency upon urination. Make sure you stay well hydrated, empty your bladder often, and wipe front to back.  Follow up with your primary care doctor within one week.      SECONDARY DISCHARGE DIAGNOSES  Diagnosis: JAX (acute kidney injury)  Assessment and Plan of Treatment: You were found to have abnormal kidney function. This was likley due to your infection. It is now back to normal. Please avoid medications that can further harm your kidneys such as IV contrast and NSAIDs (ibuprofen/advil, aleve, etc.). Follow up with your primary care doctor for routine monitoring of your kidney function.    Diagnosis: Asthma  Assessment and Plan of Treatment: Asthma    Diagnosis: DM (diabetes mellitus)  Assessment and Plan of Treatment: You were seen by endocrine who adjusted your diabetes medications. Continue to check your blood glucose before meals and at bedtime. Continue to take your diabetes medications as prescribed. Follow up with your endocrinologist or PMD.    Diagnosis: Renal cancer  Assessment and Plan of Treatment: Follow up with your oncologist Dr. Pelaez upon discharge for further treatment and care.     PRINCIPAL DISCHARGE DIAGNOSIS  Diagnosis: UTI (urinary tract infection)  Assessment and Plan of Treatment: You were admitted with sepsis due to UTI. Your urine culture grew E. Coli. You were treated with IV antibiotics. Please complete your course with oral antibiotics as an outpatient.  Seek immediate medical attention if you develop fevers, chills, burning, pain, frequency, or urgency upon urination. Make sure you stay well hydrated, empty your bladder often, and wipe front to back.  Follow up with your primary care doctor within one week.      SECONDARY DISCHARGE DIAGNOSES  Diagnosis: Renal cancer  Assessment and Plan of Treatment: Follow up with your oncologist Dr. Pelaez upon discharge for further treatment and care.    Diagnosis: Asthma  Assessment and Plan of Treatment: Continue your respiratory medications and follow up with your pulmonary doctor.    Diagnosis: JAX (acute kidney injury)  Assessment and Plan of Treatment: You were found to have abnormal kidney function. This was likley due to your infection. It is now back to normal. Please avoid medications that can further harm your kidneys such as IV contrast and NSAIDs (ibuprofen/advil, aleve, etc.). Follow up with your primary care doctor for routine monitoring of your kidney function.    Diagnosis: DM (diabetes mellitus)  Assessment and Plan of Treatment: You were seen by endocrine who adjusted your diabetes medications. Continue to check your blood glucose before meals and at bedtime. Continue to take your diabetes medications as prescribed. Follow up with your endocrinologist or PMD.

## 2020-09-23 NOTE — PROGRESS NOTE ADULT - SUBJECTIVE AND OBJECTIVE BOX
NP Note discussed with  Primary Attending    Patient is a 60y old  Female who presents with a chief complaint of sepsis (23 Sep 2020 11:25)  59 y/o female from home, with PMH of HTN, HLD, DM, metastatic renal CA to breast and pancreas diagnosed , last chemo treatment two weeks ago (9/10/20). PSH of left kidney removal , bladder reconstruction , hysterectomy Patient admitted for sepsis secondary to UTI. ID following. Patient chemo therapy on hold. hem onc following. Patient on lantus 40 units daily and humolog 10 units TID, moderate ISS for tighter blood glucose control. endocrine following.     INTERVAL HPI/OVERNIGHT EVENTS: no new complaints    MEDICATIONS  (STANDING):  atorvastatin 80 milliGRAM(s) Oral at bedtime  cyanocobalamin Injectable 1000 MICROGram(s) IntraMuscular once  ergocalciferol 28818 Unit(s) Oral <User Schedule>  heparin   Injectable 5000 Unit(s) SubCutaneous every 8 hours  insulin glargine Injectable (LANTUS) 40 Unit(s) SubCutaneous at bedtime  insulin lispro (HumaLOG) corrective regimen sliding scale   SubCutaneous three times a day before meals  insulin lispro Injectable (HumaLOG) 10 Unit(s) SubCutaneous three times a day before meals  levothyroxine 88 MICROGram(s) Oral <User Schedule>  meropenem  IVPB 1000 milliGRAM(s) IV Intermittent every 8 hours  sodium chloride 0.9%. 1000 milliLiter(s) (75 mL/Hr) IV Continuous <Continuous>  Triglide (fenofibrate) 160 milliGRAM(s) 160 milliGRAM(s) Oral daily  vancomycin  IVPB 1250 milliGRAM(s) IV Intermittent every 12 hours    MEDICATIONS  (PRN):  acetaminophen   Tablet .. 650 milliGRAM(s) Oral every 6 hours PRN Temp greater or equal to 38C (100.4F)      __________________________________________________  REVIEW OF SYSTEMS:    CONSTITUTIONAL: No fever,   EYES: no acute visual disturbances  NECK: No pain or stiffness  RESPIRATORY: No cough; No shortness of breath  CARDIOVASCULAR: No chest pain, no palpitations  GASTROINTESTINAL: No pain. No nausea or vomiting; No diarrhea   NEUROLOGICAL:  left sided headache associated with tightness tolerable, pain 5/10,  , no numbness, no tremors  MUSCULOSKELETAL: No joint pain, no muscle pain  GENITOURINARY: no dysuria, no frequency, no hesitancy  PSYCHIATRY: no depression , no anxiety  ALL OTHER  ROS negative        Vital Signs Last 24 Hrs  T(C): 36.4 (23 Sep 2020 05:18), Max: 38.6 (22 Sep 2020 22:15)  T(F): 97.5 (23 Sep 2020 05:18), Max: 101.4 (22 Sep 2020 22:15)  HR: 84 (23 Sep 2020 05:18) (84 - 103)  BP: 129/65 (23 Sep 2020 05:18) (129/65 - 153/59)  BP(mean): --  RR: 17 (23 Sep 2020 05:18) (17 - 18)  SpO2: 100% (23 Sep 2020 05:18) (100% - 100%)    ________________________________________________  PHYSICAL EXAM:  GENERAL: NAD  HEENT: Normocephalic;  conjunctivae and sclerae clear; moist mucous membranes;   NECK : supple  CHEST/LUNG: bibasilar crackles to lower lung fields to auscultation bilaterally with good air entry   HEART: S1 S2  regular; no murmurs, gallops or rubs  ABDOMEN: Soft, Nontender, Nondistended; Bowel sounds present  EXTREMITIES: no cyanosis; no edema; no calf tenderness  SKIN: warm and dry; no rash  NERVOUS SYSTEM:   #[id number] Upper sorbian speaking. Awake and alert; Oriented  to place, person and time ; no new deficits    _________________________________________________  LABS:                        10.4   4.79  )-----------( 154      ( 23 Sep 2020 07:47 )             32.9     09-    135  |  105  |  12  ----------------------------<  271<H>  4.0   |  21<L>  |  1.26    Ca    8.8      23 Sep 2020 07:47  Phos  2.1       Mg     1.6         TPro  8.1  /  Alb  3.4<L>  /  TBili  0.4  /  DBili  x   /  AST  39  /  ALT  30  /  AlkPhos  70      PT/INR - ( 21 Sep 2020 14:00 )   PT: 13.4 sec;   INR: 1.15 ratio         PTT - ( 21 Sep 2020 14:00 )  PTT:30.6 sec  Urinalysis Basic - ( 21 Sep 2020 14:00 )    Color: Yellow / Appearance: Clear / S.010 / pH: x  Gluc: x / Ketone: Negative  / Bili: Negative / Urobili: Negative   Blood: x / Protein: Negative / Nitrite: Positive   Leuk Esterase: Moderate / RBC: 2-5 /HPF / WBC >50 /HPF   Sq Epi: x / Non Sq Epi: Moderate /HPF / Bacteria: Many /HPF    CAPILLARY BLOOD GLUCOSE    POCT Blood Glucose.: 225 mg/dL (23 Sep 2020 11:54)  POCT Blood Glucose.: 329 mg/dL (23 Sep 2020 07:46)  POCT Blood Glucose.: 134 mg/dL (22 Sep 2020 21:58)  POCT Blood Glucose.: 281 mg/dL (22 Sep 2020 16:48)    RADIOLOGY & ADDITIONAL TESTS:  < from: Xray Chest 1 View-PORTABLE IMMEDIATE (20 @ 13:41) >  EXAM:  XR CHEST PORTABLE IMMED 1V                            PROCEDURE DATE:  2020          INTERPRETATION:  Portable chest x-ray    Indication: Sepsis    Portable chest x-ray is acquired without a previous examination for comparison.    Impression: No evidence for pulmonary consolidation, pleural effusion or pneumothorax.    Small left basilar atelectasis.    The trachea is midline.    The cardiac silhouette is magnified by AP technique.            NIRALI WOODS M.D., ATTENDING RADIOLOGIST  This document has been electronically signed. Sep 21 2020  1:51PM    < end of copied text >  < from: CT Abdomen and Pelvis No Cont (20 @ 21:21) >    EXAM:  CT ABDOMEN AND PELVIS                          EXAM:  CT CHEST                            PROCEDURE DATE:  2020          INTERPRETATION:  CLINICAL INFORMATION: Cough, fever, sepsis. Acute kidney injury. Metastatic renal cancer with metastasis to the breast and pancreas, on chemotherapy. Status post left nephrectomy and bladder reconstruction.    COMPARISON: None.    PROCEDURE:  CT of the Chest, Abdomen and Pelvis was performed without intravenous contrast.  Intravenous contrast: None.  Oral contrast: None.  Sagittal and coronal reformats were performed.    FINDINGS:  CHEST:  LUNGS AND LARGE AIRWAYS: Patent central airways. 5 mm right middle lobe pulmonary nodule (2:87) evaluation of the lung parenchyma is limited by significantrespiratory motion.  PLEURA: No pleural effusion.  VESSELS: Within normal limits.  HEART: Heart size is normal. No pericardial effusion. Aortic valvular calcification.  MEDIASTINUM AND MADDY: Couple nonspecific small volume mediastinal nodes.  CHEST WALL AND LOWER NECK: 2.3 x 1.8 cm posterior right breast lesion containing what appears to be a postsurgical clip. Left supraclavicular lymphadenopathy. For example a 1.3 x 1.3 cm node (4:14)    ABDOMEN AND PELVIS:    Evaluation is limited by motion.  LIVER: Within normal limits.  BILE DUCTS: Normal caliber.  GALLBLADDER: Appears contracted.  SPLEEN: Within normal limits.  PANCREAS: 1.6 cm area of hypoattenuation within the pancreatic body (4:106) as well as ill-defined enlargement of the pancreatic head. No pancreatic ductal dilatation.  ADRENALS: Within normal limits.  KIDNEYS/URETERS: Status post left nephrectomy. No nuvia right-sided hydronephrosis.    BLADDER: Within normal limits.  REPRODUCTIVE ORGANS: Hysterectomy.    BOWEL: No bowel obstruction.  PERITONEUM: No ascites.  VESSELS: Within normal limits.  RETROPERITONEUM/LYMPH NODES: Retroperitoneal lymphadenopathy. For example a 1.5 x 1.1 cm left para-aortic node (4:114). Additional numerous small volume mesenteric nodes. Left pelvic sidewall node (4:174) measures 1.4 x 0.7 cm.  ABDOMINAL WALL: Postsurgical changes. Lower anterior abdominal wall nonobstructive bowel containing widemouth hernia.  BONES: No aggressive osseous lesion..    IMPRESSION:  5 mm right middle lobe pulmonarynodule. No parenchymal consolidation or pleural effusion.    Left supraclavicular lymphadenopathy.    2.2 x 1.8 cm ovoid right posterior breast lesion, with associated surgical clip. Correlate with mammographic and postsurgical results.    Hypodensity within the pancreatic body as well as ill-defined and enlargement of the pancreatic head, concerning for underlying lesions and may correspond to patient's known pancreatic metastatic disease. Correlation with prior outside imaging, if available is recommended to evaluate for interval change. Evaluation is limited without the administration of intravenous contrast material.    Retroperitoneal lymphadenopathy.    Left nephrectomy. No nuvia right renal hydronephrosis. Evaluation is limited by motion.    2.3 x 1.8 cm posterior ovoid right breast lesion, with associated surgical clip. Correlate with mammographic findings and/or postsurgical results.          CHIP MOTTA M.D., RADIOLOGY RESIDENT  This document has been electronically signed.  CHIP MOTTA M.D., RADIOLOGY RESIDENT  This document has been electronically signed. Sep 22 2020 12:02AM    < end of copied text >      Imaging  Reviewed:  YES    Consultant(s) Notes Reviewed:   YES      Plan of care was discussed with patient and /or primary care giver; all questions and concerns were addressed NP Note discussed with  Primary Attending    Patient is a 60y old  Female who presents with a chief complaint of sepsis (23 Sep 2020 11:25)  61 y/o female from home, with PMH of HTN, HLD, DM, metastatic renal CA to breast and pancreas diagnosed , last chemo treatment two weeks ago (9/10/20). PSH of left kidney removal , bladder reconstruction , hysterectomy Patient admitted for sepsis secondary to UTI. ID following. Patient chemo therapy on hold. hem onc following. Patient found to have uncontrolled diabetes, endocrine consulted, diabetes medications adjusted.      INTERVAL HPI/OVERNIGHT EVENTS:   #[id number] [place]ivehi speaking, plan of care discussed, no complaints.    MEDICATIONS  (STANDING):  atorvastatin 80 milliGRAM(s) Oral at bedtime  cyanocobalamin Injectable 1000 MICROGram(s) IntraMuscular once  ergocalciferol 43242 Unit(s) Oral <User Schedule>  heparin   Injectable 5000 Unit(s) SubCutaneous every 8 hours  insulin glargine Injectable (LANTUS) 40 Unit(s) SubCutaneous at bedtime  insulin lispro (HumaLOG) corrective regimen sliding scale   SubCutaneous three times a day before meals  insulin lispro Injectable (HumaLOG) 10 Unit(s) SubCutaneous three times a day before meals  levothyroxine 88 MICROGram(s) Oral <User Schedule>  meropenem  IVPB 1000 milliGRAM(s) IV Intermittent every 8 hours  sodium chloride 0.9%. 1000 milliLiter(s) (75 mL/Hr) IV Continuous <Continuous>  Triglide (fenofibrate) 160 milliGRAM(s) 160 milliGRAM(s) Oral daily  vancomycin  IVPB 1250 milliGRAM(s) IV Intermittent every 12 hours    MEDICATIONS  (PRN):  acetaminophen   Tablet .. 650 milliGRAM(s) Oral every 6 hours PRN Temp greater or equal to 38C (100.4F)      __________________________________________________  REVIEW OF SYSTEMS:    CONSTITUTIONAL: No fever,   EYES: no acute visual disturbances  NECK: No pain or stiffness  RESPIRATORY: No cough; No shortness of breath  CARDIOVASCULAR: No chest pain, no palpitations  GASTROINTESTINAL: No pain. No nausea or vomiting; No diarrhea   NEUROLOGICAL:  left sided headache associated with tightness tolerable, pain 5/10,  , no numbness, no tremors  MUSCULOSKELETAL: No joint pain, no muscle pain  GENITOURINARY: no dysuria, no frequency, no hesitancy  PSYCHIATRY: no depression , no anxiety  ALL OTHER  ROS negative        Vital Signs Last 24 Hrs  T(C): 36.4 (23 Sep 2020 05:18), Max: 38.6 (22 Sep 2020 22:15)  T(F): 97.5 (23 Sep 2020 05:18), Max: 101.4 (22 Sep 2020 22:15)  HR: 84 (23 Sep 2020 05:18) (84 - 103)  BP: 129/65 (23 Sep 2020 05:18) (129/65 - 153/59)  BP(mean): --  RR: 17 (23 Sep 2020 05:18) (17 - 18)  SpO2: 100% (23 Sep 2020 05:18) (100% - 100%)    ________________________________________________  PHYSICAL EXAM:  GENERAL: NAD  HEENT: Normocephalic;  conjunctivae and sclerae clear; moist mucous membranes;   NECK : supple  CHEST/LUNG: bibasilar crackles to lower lung fields to auscultation bilaterally with good air entry   HEART: S1 S2  regular; no murmurs, gallops or rubs  ABDOMEN: Soft, Nontender, Nondistended; Bowel sounds present  EXTREMITIES: no cyanosis; no edema; no calf tenderness  SKIN: warm and dry; no rash  NERVOUS SYSTEM: Awake and alert; Oriented  to place, person and time ; no new deficits    _________________________________________________  LABS:                        10.4   4.79  )-----------( 154      ( 23 Sep 2020 07:47 )             32.9     -    135  |  105  |  12  ----------------------------<  271<H>  4.0   |  21<L>  |  1.26    Ca    8.8      23 Sep 2020 07:47  Phos  2.1       Mg     1.6         TPro  8.1  /  Alb  3.4<L>  /  TBili  0.4  /  DBili  x   /  AST  39  /  ALT  30  /  AlkPhos  70      PT/INR - ( 21 Sep 2020 14:00 )   PT: 13.4 sec;   INR: 1.15 ratio         PTT - ( 21 Sep 2020 14:00 )  PTT:30.6 sec  Urinalysis Basic - ( 21 Sep 2020 14:00 )    Color: Yellow / Appearance: Clear / S.010 / pH: x  Gluc: x / Ketone: Negative  / Bili: Negative / Urobili: Negative   Blood: x / Protein: Negative / Nitrite: Positive   Leuk Esterase: Moderate / RBC: 2-5 /HPF / WBC >50 /HPF   Sq Epi: x / Non Sq Epi: Moderate /HPF / Bacteria: Many /HPF    CAPILLARY BLOOD GLUCOSE    POCT Blood Glucose.: 225 mg/dL (23 Sep 2020 11:54)  POCT Blood Glucose.: 329 mg/dL (23 Sep 2020 07:46)  POCT Blood Glucose.: 134 mg/dL (22 Sep 2020 21:58)  POCT Blood Glucose.: 281 mg/dL (22 Sep 2020 16:48)    RADIOLOGY & ADDITIONAL TESTS:  < from: Xray Chest 1 View-PORTABLE IMMEDIATE (20 @ 13:41) >  EXAM:  XR CHEST PORTABLE IMMED 1V                            PROCEDURE DATE:  2020          INTERPRETATION:  Portable chest x-ray    Indication: Sepsis    Portable chest x-ray is acquired without a previous examination for comparison.    Impression: No evidence for pulmonary consolidation, pleural effusion or pneumothorax.    Small left basilar atelectasis.    The trachea is midline.    The cardiac silhouette is magnified by AP technique.            NIRALI WOODS M.D., ATTENDING RADIOLOGIST  This document has been electronically signed. Sep 21 2020  1:51PM    < end of copied text >  < from: CT Abdomen and Pelvis No Cont (20 @ 21:21) >    EXAM:  CT ABDOMEN AND PELVIS                          EXAM:  CT CHEST                            PROCEDURE DATE:  2020          INTERPRETATION:  CLINICAL INFORMATION: Cough, fever, sepsis. Acute kidney injury. Metastatic renal cancer with metastasis to the breast and pancreas, on chemotherapy. Status post left nephrectomy and bladder reconstruction.    COMPARISON: None.    PROCEDURE:  CT of the Chest, Abdomen and Pelvis was performed without intravenous contrast.  Intravenous contrast: None.  Oral contrast: None.  Sagittal and coronal reformats were performed.    FINDINGS:  CHEST:  LUNGS AND LARGE AIRWAYS: Patent central airways. 5 mm right middle lobe pulmonary nodule (2:87) evaluation of the lung parenchyma is limited by significantrespiratory motion.  PLEURA: No pleural effusion.  VESSELS: Within normal limits.  HEART: Heart size is normal. No pericardial effusion. Aortic valvular calcification.  MEDIASTINUM AND MADDY: Couple nonspecific small volume mediastinal nodes.  CHEST WALL AND LOWER NECK: 2.3 x 1.8 cm posterior right breast lesion containing what appears to be a postsurgical clip. Left supraclavicular lymphadenopathy. For example a 1.3 x 1.3 cm node (4:14)    ABDOMEN AND PELVIS:    Evaluation is limited by motion.  LIVER: Within normal limits.  BILE DUCTS: Normal caliber.  GALLBLADDER: Appears contracted.  SPLEEN: Within normal limits.  PANCREAS: 1.6 cm area of hypoattenuation within the pancreatic body (4:106) as well as ill-defined enlargement of the pancreatic head. No pancreatic ductal dilatation.  ADRENALS: Within normal limits.  KIDNEYS/URETERS: Status post left nephrectomy. No nuvia right-sided hydronephrosis.    BLADDER: Within normal limits.  REPRODUCTIVE ORGANS: Hysterectomy.    BOWEL: No bowel obstruction.  PERITONEUM: No ascites.  VESSELS: Within normal limits.  RETROPERITONEUM/LYMPH NODES: Retroperitoneal lymphadenopathy. For example a 1.5 x 1.1 cm left para-aortic node (4:114). Additional numerous small volume mesenteric nodes. Left pelvic sidewall node (4:174) measures 1.4 x 0.7 cm.  ABDOMINAL WALL: Postsurgical changes. Lower anterior abdominal wall nonobstructive bowel containing widemouth hernia.  BONES: No aggressive osseous lesion..    IMPRESSION:  5 mm right middle lobe pulmonarynodule. No parenchymal consolidation or pleural effusion.    Left supraclavicular lymphadenopathy.    2.2 x 1.8 cm ovoid right posterior breast lesion, with associated surgical clip. Correlate with mammographic and postsurgical results.    Hypodensity within the pancreatic body as well as ill-defined and enlargement of the pancreatic head, concerning for underlying lesions and may correspond to patient's known pancreatic metastatic disease. Correlation with prior outside imaging, if available is recommended to evaluate for interval change. Evaluation is limited without the administration of intravenous contrast material.    Retroperitoneal lymphadenopathy.    Left nephrectomy. No nuvia right renal hydronephrosis. Evaluation is limited by motion.    2.3 x 1.8 cm posterior ovoid right breast lesion, with associated surgical clip. Correlate with mammographic findings and/or postsurgical results.          CHIP MOTTA M.D., RADIOLOGY RESIDENT  This document has been electronically signed.  CHIP MOTTA M.D., RADIOLOGY RESIDENT  This document has been electronically signed. Sep 22 2020 12:02AM    < end of copied text >      Imaging  Reviewed:  YES    Consultant(s) Notes Reviewed:   YES      Plan of care was discussed with patient and /or primary care giver; all questions and concerns were addressed

## 2020-09-23 NOTE — DISCHARGE NOTE PROVIDER - NSDCMRMEDTOKEN_GEN_ALL_CORE_FT
Admelog SoloStar 100 units/mL injectable solution: 28 unit(s) injectable 3 times a day (before meals)  atorvastatin 40 mg oral tablet: 1 tab(s) orally once a day  Claritin 10 mg oral tablet: 1 tab(s) orally once a day  Dulera 200 mcg-5 mcg/inh inhalation aerosol: 2 puff(s) inhaled 2 times a day  enalapril 20 mg oral tablet: 1 tab(s) orally once a day  ergocalciferol 50,000 intl units (1.25 mg) oral capsule: 1 cap(s) orally once a week  fenofibrate 160 mg oral tablet: 1 tab(s) orally once a day  fenofibrate 160 mg oral tablet: 1 tab(s) orally once a day  levothyroxine 88 mcg (0.088 mg) oral tablet: 1 tab(s) orally once a day  metFORMIN 500 mg oral tablet: 1 tab(s) orally 2 times a day  Singulair 10 mg oral tablet: 1 tab(s) orally once a day   Admelog SoloStar 100 units/mL injectable solution: 28 unit(s) injectable 3 times a day (before meals)  amoxicillin-clavulanate 875 mg-125 mg oral tablet: 1 tab(s) orally 2 times a day  atorvastatin 40 mg oral tablet: 1 tab(s) orally once a day  Claritin 10 mg oral tablet: 1 tab(s) orally once a day  diphenhydrAMINE 25 mg oral capsule: 1 cap(s) orally every 6 hours, As needed, Rash and/or Itching  Dulera 200 mcg-5 mcg/inh inhalation aerosol: 2 puff(s) inhaled 2 times a day  ergocalciferol 50,000 intl units (1.25 mg) oral capsule: 1 cap(s) orally once a week  fenofibrate 160 mg oral tablet: 1 tab(s) orally once a day  levothyroxine 88 mcg (0.088 mg) oral tablet: 1 tab(s) orally once a day  metFORMIN 500 mg oral tablet: 1 tab(s) orally 2 times a day  pantoprazole 40 mg oral delayed release tablet: 1 tab(s) orally once a day (before a meal)  Singulair 10 mg oral tablet: 1 tab(s) orally once a day   amoxicillin-clavulanate 875 mg-125 mg oral tablet: 1 tab(s) orally 2 times a day  atorvastatin 40 mg oral tablet: 1 tab(s) orally once a day  Claritin 10 mg oral tablet: 1 tab(s) orally once a day  diphenhydrAMINE 25 mg oral capsule: 1 cap(s) orally every 6 hours, As needed, Rash and/or Itching  Dulera 200 mcg-5 mcg/inh inhalation aerosol: 2 puff(s) inhaled 2 times a day  ergocalciferol 50,000 intl units (1.25 mg) oral capsule: 1 cap(s) orally once a week  fenofibrate 160 mg oral tablet: 1 tab(s) orally once a day  HumaLOG KwikPen 200 units/mL (Concentrated) subcutaneous solution: 20 unit(s) subcutaneous 3 times a day (with meals)   insulin glargine: 60 unit(s) subcutaneous once a day   levothyroxine 88 mcg (0.088 mg) oral tablet: 1 tab(s) orally once a day  metFORMIN 500 mg oral tablet: 1 tab(s) orally 2 times a day  pantoprazole 40 mg oral delayed release tablet: 1 tab(s) orally once a day (before a meal)  Singulair 10 mg oral tablet: 1 tab(s) orally once a day  Symbicort 80 mcg-4.5 mcg/inh inhalation aerosol: 1 puff(s) inhaled 2 times a day   amoxicillin-clavulanate 875 mg-125 mg oral tablet: 1 tab(s) orally 2 times a day  atorvastatin 40 mg oral tablet: 1 tab(s) orally once a day  Claritin 10 mg oral tablet: 1 tab(s) orally once a day  diphenhydrAMINE 25 mg oral capsule: 1 cap(s) orally every 6 hours, As needed, Rash and/or Itching  Dulera 200 mcg-5 mcg/inh inhalation aerosol: 2 puff(s) inhaled 2 times a day  ergocalciferol 50,000 intl units (1.25 mg) oral capsule: 1 cap(s) orally once a week  fenofibrate 160 mg oral tablet: 1 tab(s) orally once a day  HumaLOG KwikPen 200 units/mL (Concentrated) subcutaneous solution: 20 unit(s) subcutaneous 3 times a day (with meals)   insulin glargine: 60 unit(s) subcutaneous once a day   levothyroxine 88 mcg (0.088 mg) oral tablet: 1 tab(s) orally once a day  metFORMIN 500 mg oral tablet: 1 tab(s) orally 2 times a day  pantoprazole 40 mg oral delayed release tablet: 1 tab(s) orally once a day (before a meal)  Singulair 10 mg oral tablet: 1 tab(s) orally once a day   amoxicillin-clavulanate 875 mg-125 mg oral tablet: 1 tab(s) orally 2 times a day. Take until finished.  atorvastatin 40 mg oral tablet: 1 tab(s) orally once a day  Claritin 10 mg oral tablet: 1 tab(s) orally once a day  diphenhydrAMINE 25 mg oral capsule: 1 cap(s) orally every 6 hours, As needed, Rash and/or Itching  Dulera 200 mcg-5 mcg/inh inhalation aerosol: 2 puff(s) inhaled 2 times a day  ergocalciferol 50,000 intl units (1.25 mg) oral capsule: 1 cap(s) orally once a week  fenofibrate 160 mg oral tablet: 1 tab(s) orally once a day  HumaLOG KwikPen 200 units/mL (Concentrated) subcutaneous solution: 20 unit(s) subcutaneous 3 times a day (with meals)   insulin glargine: 60 unit(s) subcutaneous once a day   levothyroxine 88 mcg (0.088 mg) oral tablet: 1 tab(s) orally once a day  metFORMIN 500 mg oral tablet: 1 tab(s) orally 2 times a day  pantoprazole 40 mg oral delayed release tablet: 1 tab(s) orally once a day (before a meal)  Singulair 10 mg oral tablet: 1 tab(s) orally once a day

## 2020-09-23 NOTE — PROGRESS NOTE ADULT - PROBLEM SELECTOR PLAN 1
-Patient admitted for sepsis secondary to UTI  -continue with meropenum and vancomycin, c/s pending  -Tylenol PRN for fever  -vanco trough 10.4  - ID Dr. Rojas consulted -Patient admitted for sepsis secondary to UTI  -urine culture with e.coli  -blood cultures negative  -continue with meropenum and vancomycin, c/s pending  -vanco trough 10.4  -low grade fever this afternoon  - ID Dr. Rojas consulted

## 2020-09-23 NOTE — DISCHARGE NOTE PROVIDER - HOSPITAL COURSE
61 y/o F from home, lives alone with CDPAP program (mohamud Collins is a caregiver 5hrs/ day 5days/wk)  with a significant PMHx of HTN, DM and metastatic renal CA (since 2008, mets to breast and pancreas, on chemo x about a year every 2 weeks),  PSH of Left kidney removal in 2008 and bladder reconstruction in 2008, hysterectomy presents to the ED with nonproductive cough x 4 days associated with fever and chills. Patient's last chemo was 2 weeks ago. Patient seen by PMD Dr. Valdivia, noted for fever 103F and was sent in for further evaluation. Pt lost weight about 20 lbs in 6 months.  CXR shows L basilar atelectasis, no infiltration. Admitted for sepsis secondary to UTI. Urine culture with E. Coli. ID Dr. Rojas consulted, treated with IV antibiotics. Heme/onc QMA following, pt to follow up with Dr. Pelaez upon discharge. Endocrine consulted for uncontrolled diabetes and hypothyroidism, insulin regimen adjusted.     NOT COMPLETE 59 y/o F from home, lives alone with CDPAP program (mohamud Collins is a caregiver 5hrs/ day 5days/wk)  with a significant PMHx of HTN, DM and metastatic renal CA (since 2008, mets to breast and pancreas, on chemo x about a year every 2 weeks),  PSH of Left kidney removal in 2008 and bladder reconstruction in 2008, hysterectomy presents to the ED with nonproductive cough x 4 days associated with fever and chills. Patient's last chemo was 2 weeks ago. Patient seen by PMD Dr. Valdivia, noted for fever 103F and was sent in for further evaluation. Pt lost weight about 20 lbs in 6 months.  CXR shows L basilar atelectasis, no infiltration. Admitted for sepsis secondary to UTI. Urine culture with E. Coli. ID Dr. Rojas consulted, treated with Ceftriaxone. Heme/onc QMA following, pt to follow up with Dr. Pelaez upon discharge. Endocrine consulted for uncontrolled diabetes and hypothyroidism, insulin regimen adjusted.     NOT COMPLETE 61 y/o F from home, lives alone with CDPAP program (mohamud Collins is a caregiver 5hrs/ day 5days/wk)  with a significant PMHx of HTN, DM and metastatic renal CA (since 2008, mets to breast and pancreas, on chemo x about a year every 2 weeks),  PSH of Left kidney removal in 2008 and bladder reconstruction in 2008, hysterectomy presents to the ED with nonproductive cough x 4 days associated with fever and chills. Patient's last chemo was 2 weeks ago. Patient seen by PMD Dr. Valdivia, noted for fever 103F and was sent in for further evaluation. Pt lost weight about 20 lbs in 6 months.  CXR shows L basilar atelectasis, no infiltration. Admitted for sepsis secondary to UTI. Urine culture with E. Coli. ID Dr. Rojas consulted, treated with Ceftriaxone. Heme/onc QMA following, pt to follow up with Dr. Pelaze upon discharge. Endocrine consulted for uncontrolled diabetes and hypothyroidism, insulin regimen adjusted. hospital course complicated with development of rash b/l UE, s/p dose of steroids and PRN Benadryl. abx changed to PO Augmentin, rash improving clinically improving, medically optimized for discharge with outpt follow up   Please note that this a brief summary of hospital course please refer to daily progress notes and consult notes for full course and events 59 y/o F from home, lives alone with CDPAP program (mohamud Collins is a caregiver 5hrs/ day 5days/wk)  with a significant PMHx of HTN, DM and metastatic renal CA (since 2008, mets to breast and pancreas, on chemo x about a year every 2 weeks),  PSH of Left kidney removal in 2008 and bladder reconstruction in 2008, hysterectomy presents to the ED with nonproductive cough x 4 days associated with fever and chills. Patient's last chemo was 2 weeks ago. Patient seen by PMD Dr. Valdivia, noted for fever 103F and was sent in for further evaluation. Pt lost weight about 20 lbs in 6 months.  CXR shows L basilar atelectasis, no infiltration. Admitted for sepsis secondary to UTI. Urine culture with E. Coli. ID Dr. Rojas consulted, treated with Ceftriaxone. Heme/onc QMA following, pt to follow up with Dr. Pelaez upon discharge. Endocrine consulted for uncontrolled diabetes and hypothyroidism, insulin regimen adjusted. hospital course complicated with development of rash b/l UE, s/p dose of steroids and PRN Benadryl. abx changed to PO Augmentin, rash improving clinically improving, medically optimized for discharge with close outpt follow up.  Please note that this a brief summary of hospital course please refer to daily progress notes and consult notes for full course and events

## 2020-09-23 NOTE — DISCHARGE NOTE PROVIDER - PROVIDER TOKENS
PROVIDER:[TOKEN:[[id number]]],PROVIDER:[TOKEN:[[id number]]] PROVIDER:[TOKEN:[[id number]],FOLLOWUP:[1 week]],PROVIDER:[TOKEN:[[id number]],FOLLOWUP:[1 week]]

## 2020-09-23 NOTE — DISCHARGE NOTE PROVIDER - CARE PROVIDER_API CALL
Tin Pelaez  HEMATOLOGY  [address], [address]  [place] [address]  Phone: [phone]  Fax: [phone]  Follow Up Time:     Kareem Olivera  INTERNAL MEDICINE  [address] [place] [address]  Phone: [phone]  Fax: [phone]  Follow Up Time:    Tin Pelaez  HEMATOLOGY  [address], [address]  [place] [address]  Phone: [phone]  Fax: [phone]  Follow Up Time: 1 week    Kareem Olivera  INTERNAL MEDICINE  [address] [place] [address]  Phone: [phone]  Fax: [phone]  Follow Up Time: 1 week

## 2020-09-23 NOTE — PROGRESS NOTE ADULT - ASSESSMENT
61 y/o F from home, living alone with CDPAP program (mohamud Collins is a caregiver 5hrs/ day 5days/wk)  with a significant PMHx of HTN, DM and metastatic renal CA (since 2008, mets to breast and pancreas, on chemo x about a year every 2 weeks),  PSH of Left kidney removal in 2008 and bladder reconstruction in 2008, hysterectomy presents to the ED with nonproductive cough x 4 days associated with fever and chills. Admitted for Sepsis secondary to UTI.     Endocrinology service was consulted given uncontrolled blood sugars during hospital stay.     1. DM with uncontrolled blood sugar:   -Patient has hx of DM, takes Basaglar 65 units at night, Admelog 20 in morning, 28 afternoon and 22 in evening at home.   with occasional hypoglycemia  -HbA1C: 7.5%  -Blood sugar this morning was noted to be 329 this morning.   -Would increase Lantus to 50 units and Humalog 14 units TID   -C/w moderate HSS    2. Hypothyroidism:    -Patient has hx of hypothyroidism, on levothyroxine   TSH mildly elevated to 6.34    Will get Free T4 and adjust lt4 dose- testing      61 y/o F from home, living alone with CDPAP program (mohamud Collins is a caregiver 5hrs/ day 5days/wk)  with a significant PMHx of HTN, DM and metastatic renal CA (since 2008, mets to breast and pancreas, on chemo x about a year every 2 weeks),  PSH of Left kidney removal in 2008 and bladder reconstruction in 2008, hysterectomy presents to the ED with nonproductive cough x 4 days associated with fever and chills. Admitted for Sepsis secondary to UTI.     Endocrinology service was consulted given uncontrolled blood sugars during hospital stay.     1. DM with uncontrolled blood sugar:   -Patient has hx of DM, takes Basaglar 65 units at night, Admelog 20 in morning, 28 afternoon and 22 in evening at home.   with occasional hypoglycemia  -HbA1C: 7.5%  -Blood sugar this morning was noted to be 329 this morning.   -Would increase Lantus to 50 units and Humalog 14 units TID   -C/w moderate HSS  d/w hs    2. Hypothyroidism:    -Patient has hx of hypothyroidism, on levothyroxine   TSH mildly elevated to 6.34    Will get Free T4 and adjust lt4 dose- testing

## 2020-09-23 NOTE — PROGRESS NOTE ADULT - SUBJECTIVE AND OBJECTIVE BOX
complete note to follow · Subjective and Objective:    History of Present Illness:  Reason for Admission: sepsis  History of Present Illness:   61 y/o F from home, living alone with CDPAP program (niece Karina is a caregiver 5hrs/ day 5days/wk)  with a significant PMHx of HTN, DM and metastatic renal CA (since 2008, mets to breast and pancreas, on chemo x about a year every 2 weeks),  PSH of Left kidney removal in 2008 and bladder reconstruction in 2008, hysterectomy presents to the ED with nonproductive cough x 4 days associated with fever and chills. Patient's last chemo was 2 weeks ago. Patient seen by PMD Dr. Valdivia today, noted for fever 103F and was sent in for further evaluation. Patient relates to history of PNA 2 years ago. Reports slight decreased appetite with no nausea, vomiting, diarrhea or shortness of breath. Accompanied by her niece. Pt lost weight about 20 lbs in 6 months.    In ED;   , fever 102F, cefepime and vanco, NS bolus 1.7L was given. UA positive, CXR shows L basilar atelectasis, no infiltration.    OncHx: Pt well-known to our practice and follows with Oncologist Dr. Pelaez. Pt was diagnosed with Renal Cell Carcinoma in 2008, s/p left nephrectomy the same year. She then developed a pancreatic and a breast lesion both confirmed to be metastatic RCC and the patient was started on sutent. In 11/2016 her RCC was in remission and sutent was discontinued. in 05/2019 she developed another breast mass and the bx was c/w RCC and at that time started on Nivo/Ipi. She developed B/L breast lesions in 08/2020 and Bx confirmed met RCC, pt continued on Nivo/Ipi, last given 09/10/20    GOC: FULL CODE        Allergies and Intolerances:        Allergies:  	sulfa drugs: Drug Category, Hives    Home Medications:   * Outpatient Medication Status not yet specified  · 	ergocalciferol 50,000 intl units (1.25 mg) oral capsule: Last Dose Taken:  , 1 cap(s) orally once a week  · 	Basaglar KwikPen 100 units/mL subcutaneous solution: Last Dose Taken:  , 65 unit(s) subcutaneous once a day (in the evening) at 5pm  · 	Admelog SoloStar 100 units/mL injectable solution: Last Dose Taken:  , 20 unit(s) injectable once a day (in the morning)  · 	Admelog SoloStar 100 units/mL injectable solution: Last Dose Taken:  , 28 unit(s) injectable once a day (in the afternoon) at 12pm  · 	Admelog SoloStar 100 units/mL injectable solution: Last Dose Taken:  , 22 unit(s) injectable once a day (in the evening)  · 	metFORMIN 500 mg oral tablet: Last Dose Taken:  , 1 tab(s) orally 2 times a day  · 	levothyroxine 88 mcg (0.088 mg) oral tablet: Last Dose Taken:  , 1 tab(s) orally once a day  · 	fenofibrate 160 mg oral tablet: Last Dose Taken:  , 1 tab(s) orally once a day  · 	atorvastatin 80 mg oral tablet: Last Dose Taken:  , 1 tab(s) orally once a day    MEDICATIONS  (STANDING):  atorvastatin 80 milliGRAM(s) Oral at bedtime  ergocalciferol 60461 Unit(s) Oral <User Schedule>  heparin   Injectable 5000 Unit(s) SubCutaneous every 8 hours  insulin glargine Injectable (LANTUS) 32 Unit(s) SubCutaneous at bedtime  insulin lispro (HumaLOG) corrective regimen sliding scale   SubCutaneous three times a day before meals  insulin lispro Injectable (HumaLOG) 10 Unit(s) SubCutaneous three times a day before meals  levothyroxine 88 MICROGram(s) Oral <User Schedule>  meropenem  IVPB 1000 milliGRAM(s) IV Intermittent every 8 hours  sodium chloride 0.9%. 1000 milliLiter(s) (75 mL/Hr) IV Continuous <Continuous>  Triglide (fenofibrate) 160 milliGRAM(s) 160 milliGRAM(s) Oral daily  vancomycin  IVPB 1250 milliGRAM(s) IV Intermittent every 12 hours      MEDICATIONS  (PRN):  acetaminophen   Tablet .. 650 milliGRAM(s) Oral every 6 hours PRN Temp greater or equal to 38C (100.4F)      Patient History:    Past Medical, Past Surgical, and Family History:  PAST MEDICAL HISTORY:  DM (diabetes mellitus)   HTN (hypertension)   Renal cancer.     PAST SURGICAL HISTORY:  H/O bladder repair surgery   History of left nephrectomy 2008  S/P hysterectomy.     Social History:  Social History (marital status, living situation, occupation, tobacco use, alcohol and drug use, and sexual history): Pt denied smoking, alcohol use or illicit drug use.     Tobacco Screening:  · Core Measure Site	Yes  · Has the patient used tobacco in the past 30 days?	No    Risk Assessment:    Present on Admission:  Deep Venous Thrombosis	no  Pulmonary Embolus	no     Heart Failure:  Does this patient have a history of or has been diagnosed with heart failure? unknown.    HIV Screen (per [facility] of Health, HIV screening must be offered to every individual between ages 13 and 64)	Offered and patient accepted    Vitals:  Vital Signs Last 24 Hrs  T(C): 38.1 (23 Sep 2020 13:37), Max: 38.6 (22 Sep 2020 22:15)  T(F): 100.5 (23 Sep 2020 13:37), Max: 101.4 (22 Sep 2020 22:15)  HR: 102 (23 Sep 2020 14:40) (84 - 103)  BP: 148/59 (23 Sep 2020 14:40) (129/65 - 148/59)  BP(mean): --  ABP: --  ABP(mean): --  RR: 17 (23 Sep 2020 14:40) (17 - 18)  SpO2: 100% (23 Sep 2020 14:40) (100% - 100%)        ROS:  CONSTITUTIONAL:  fevers and mild H/A, no chills, sweats  HEENT:  Eyes:  No diplopia or blurred vision. No earache, sore throat or runny nose.  CARDIOVASCULAR:  No pressure, squeezing, tightness, or heaviness about the chest; no palpitations.  RESPIRATORY:  No cough, SOB  GASTROINTESTINAL:  No abdominal pain, nausea, vomiting or diarrhea.  GENITOURINARY:  No dysuria, frequency or urgency.  NEUROLOGIC:  No paresthesias, fasciculations, seizures or weakness.  PSYCHIATRIC:  No disorder of thought or mood.        PHYSICAL EXAMINATION:  GENERAL: lying in bed, NAD   HEENT: Head is normocephalic and atraumatic. EOMI. Mucous membranes are moist.   NECK: Supple.   LUNGS: CTA B/L without wheezing, rales, or rhonchi. Respirations unlabored  HEART: S1S2 without murmur.  ABDOMEN: Soft, nontender, and nondistended. BS+  No hepatosplenomegaly is noted.  EXTREMITIES: Without any cyanosis, clubbing, rash, lesions or edema.  NEUROLOGIC: Grossly intact.    Labs:             CBC Full  -  ( 23 Sep 2020 07:47 )  WBC Count : 4.79 K/uL  RBC Count : 3.87 M/uL  Hemoglobin : 10.4 g/dL  Hematocrit : 32.9 %  Platelet Count - Automated : 154 K/uL  Mean Cell Volume : 85.0 fl  Mean Cell Hemoglobin : 26.9 pg  Mean Cell Hemoglobin Concentration : 31.6 gm/dL  Auto Neutrophil # : x  Auto Lymphocyte # : x  Auto Monocyte # : x  Auto Eosinophil # : x  Auto Basophil # : x  Auto Neutrophil % : x  Auto Lymphocyte % : x  Auto Monocyte % : x  Auto Eosinophil % : x  Auto Basophil % : x    09-23    135  |  105  |  12  ----------------------------<  271<H>  4.0   |  21<L>  |  1.26    Ca    8.8      23 Sep 2020 07:47  Phos  2.1     09-22  Mg     1.6     09-22      Radiology:  < from: CT Abdomen and Pelvis No Cont (09.21.20 @ 21:21) >  EXAM:  CT ABDOMEN AND PELVIS                          EXAM:  CT CHEST                            PROCEDURE DATE:  09/21/2020          INTERPRETATION:  CLINICAL INFORMATION: Cough, fever, sepsis. Acute kidney injury. Metastatic renal cancer with metastasis to the breast and pancreas, on chemotherapy. Status post left nephrectomy and bladder reconstruction.    COMPARISON: None.    PROCEDURE:  CT of the Chest, Abdomen and Pelvis was performed without intravenous contrast.  Intravenous contrast: None.  Oral contrast: None.  Sagittal and coronal reformats were performed.    FINDINGS:  CHEST:  LUNGS AND LARGE AIRWAYS: Patent central airways. 5 mm right middle lobe pulmonary nodule (2:87) evaluation of the lung parenchyma is limited by significantrespiratory motion.  PLEURA: No pleural effusion.  VESSELS: Within normal limits.  HEART: Heart size is normal. No pericardial effusion. Aortic valvular calcification.  MEDIASTINUM AND MADDY: Couple nonspecific small volume mediastinal nodes.  CHEST WALL AND LOWER NECK: 2.3 x 1.8 cm posterior right breast lesion containing what appears to be a postsurgical clip. Left supraclavicular lymphadenopathy. For example a 1.3 x 1.3 cm node (4:14)    ABDOMEN AND PELVIS:    Evaluation is limited by motion.  LIVER: Within normal limits.  BILE DUCTS: Normal caliber.  GALLBLADDER: Appears contracted.  SPLEEN: Within normal limits.  PANCREAS: 1.6 cm area of hypoattenuation within the pancreatic body (4:106) as well as ill-defined enlargement of the pancreatic head. No pancreatic ductal dilatation.  ADRENALS: Within normal limits.  KIDNEYS/URETERS: Status post left nephrectomy. No nuvia right-sided hydronephrosis.    BLADDER: Within normal limits.  REPRODUCTIVE ORGANS: Hysterectomy.    BOWEL: No bowel obstruction.  PERITONEUM: No ascites.  VESSELS: Within normal limits.  RETROPERITONEUM/LYMPH NODES: Retroperitoneal lymphadenopathy. For example a 1.5 x 1.1 cm left para-aortic node (4:114). Additional numerous small volume mesenteric nodes. Left pelvic sidewall node (4:174) measures 1.4 x 0.7 cm.  ABDOMINAL WALL: Postsurgical changes. Lower anterior abdominal wall nonobstructive bowel containing widemouth hernia.  BONES: No aggressive osseous lesion..    IMPRESSION:  5 mm right middle lobe pulmonarynodule. No parenchymal consolidation or pleural effusion.    Left supraclavicular lymphadenopathy.    2.2 x 1.8 cm ovoid right posterior breast lesion, with associated surgical clip. Correlate with mammographic and postsurgical results.    Hypodensity within the pancreatic body as well as ill-defined and enlargement of the pancreatic head, concerning for underlying lesions and may correspond to patient's known pancreatic metastatic disease. Correlation with prior outside imaging, if available is recommended to evaluate for interval change. Evaluation is limited without the administration of intravenous contrast material.    Retroperitoneal lymphadenopathy.    Left nephrectomy. No nuvia right renal hydronephrosis. Evaluation is limited by motion.    2.3 x 1.8 cm posterior ovoid right breast lesion, with associated surgical clip. Correlate with mammographic findings and/or postsurgical results.          CHIP MOTTA M.D., RADIOLOGY RESIDENT  This document has been electronically signed.  CHIP MOTTA M.D., RADIOLOGY RESIDENT  This document has been electronically signed. Sep 22 2020 12:02AM    < end of copied text >

## 2020-09-23 NOTE — PROGRESS NOTE ADULT - ATTENDING COMMENTS
I have reviewed patient's data and participated in the management of the patient along with Kaela PAREDES as well as hematology/med oncology faculty during the daily heme/onc case review. I reviewed pertinent clinical information, PE,  labs as well as A/P as outline above, in agreement and edited as appropriate. We will continue to follow.

## 2020-09-24 DIAGNOSIS — D64.9 ANEMIA, UNSPECIFIED: ICD-10-CM

## 2020-09-24 LAB
% ALBUMIN: 45.5 % — SIGNIFICANT CHANGE UP
% ALPHA 1: 7.7 % — SIGNIFICANT CHANGE UP
% ALPHA 2: 13.8 % — SIGNIFICANT CHANGE UP
% BETA: 16.3 % — SIGNIFICANT CHANGE UP
% GAMMA: 16.7 % — SIGNIFICANT CHANGE UP
ALBUMIN SERPL ELPH-MCNC: 3.7 G/DL — SIGNIFICANT CHANGE UP (ref 3.6–5.5)
ALBUMIN/GLOB SERPL ELPH: 0.8 RATIO — SIGNIFICANT CHANGE UP
ALPHA1 GLOB SERPL ELPH-MCNC: 0.6 G/DL — HIGH (ref 0.1–0.4)
ALPHA2 GLOB SERPL ELPH-MCNC: 1.1 G/DL — HIGH (ref 0.5–1)
ANION GAP SERPL CALC-SCNC: 8 MMOL/L — SIGNIFICANT CHANGE UP (ref 5–17)
ANION GAP SERPL CALC-SCNC: SIGNIFICANT CHANGE UP MMOL/L (ref 5–17)
B-GLOBULIN SERPL ELPH-MCNC: 1.3 G/DL — HIGH (ref 0.5–1)
BUN SERPL-MCNC: 15 MG/DL — SIGNIFICANT CHANGE UP (ref 7–18)
BUN SERPL-MCNC: SIGNIFICANT CHANGE UP MG/DL (ref 7–18)
CALCIUM SERPL-MCNC: 8.8 MG/DL — SIGNIFICANT CHANGE UP (ref 8.4–10.5)
CALCIUM SERPL-MCNC: SIGNIFICANT CHANGE UP MG/DL (ref 8.4–10.5)
CHLORIDE SERPL-SCNC: 101 MMOL/L — SIGNIFICANT CHANGE UP (ref 96–108)
CHLORIDE SERPL-SCNC: SIGNIFICANT CHANGE UP MMOL/L (ref 96–108)
CO2 SERPL-SCNC: 25 MMOL/L — SIGNIFICANT CHANGE UP (ref 22–31)
CO2 SERPL-SCNC: SIGNIFICANT CHANGE UP MMOL/L (ref 22–31)
CREAT SERPL-MCNC: 1.25 MG/DL — SIGNIFICANT CHANGE UP (ref 0.5–1.3)
CREAT SERPL-MCNC: 1.3 MG/DL — SIGNIFICANT CHANGE UP (ref 0.5–1.3)
GAMMA GLOBULIN: 1.4 G/DL — SIGNIFICANT CHANGE UP (ref 0.6–1.6)
GLUCOSE BLDC GLUCOMTR-MCNC: 142 MG/DL — HIGH (ref 70–99)
GLUCOSE BLDC GLUCOMTR-MCNC: 149 MG/DL — HIGH (ref 70–99)
GLUCOSE BLDC GLUCOMTR-MCNC: 270 MG/DL — HIGH (ref 70–99)
GLUCOSE BLDC GLUCOMTR-MCNC: 299 MG/DL — HIGH (ref 70–99)
GLUCOSE SERPL-MCNC: 292 MG/DL — HIGH (ref 70–99)
GLUCOSE SERPL-MCNC: SIGNIFICANT CHANGE UP MG/DL (ref 70–99)
HCT VFR BLD CALC: 29.5 % — LOW (ref 34.5–45)
HGB BLD-MCNC: 9.7 G/DL — LOW (ref 11.5–15.5)
IGA FLD-MCNC: 385 MG/DL — SIGNIFICANT CHANGE UP (ref 84–499)
IGG FLD-MCNC: 1064 MG/DL — SIGNIFICANT CHANGE UP (ref 610–1660)
IGM SERPL-MCNC: 102 MG/DL — SIGNIFICANT CHANGE UP (ref 35–242)
INTERPRETATION SERPL IFE-IMP: SIGNIFICANT CHANGE UP
KAPPA LC SER QL IFE: 4.19 MG/DL — HIGH (ref 0.33–1.94)
KAPPA/LAMBDA FREE LIGHT CHAIN RATIO, SERUM: 1.11 RATIO — SIGNIFICANT CHANGE UP (ref 0.26–1.65)
LAMBDA LC SER QL IFE: 3.78 MG/DL — HIGH (ref 0.57–2.63)
MCHC RBC-ENTMCNC: 26.8 PG — LOW (ref 27–34)
MCHC RBC-ENTMCNC: 32.9 GM/DL — SIGNIFICANT CHANGE UP (ref 32–36)
MCV RBC AUTO: 81.5 FL — SIGNIFICANT CHANGE UP (ref 80–100)
NRBC # BLD: 0 /100 WBCS — SIGNIFICANT CHANGE UP (ref 0–0)
PLATELET # BLD AUTO: 166 K/UL — SIGNIFICANT CHANGE UP (ref 150–400)
POTASSIUM SERPL-MCNC: 3.9 MMOL/L — SIGNIFICANT CHANGE UP (ref 3.5–5.3)
POTASSIUM SERPL-MCNC: SIGNIFICANT CHANGE UP MMOL/L (ref 3.5–5.3)
POTASSIUM SERPL-SCNC: 3.9 MMOL/L — SIGNIFICANT CHANGE UP (ref 3.5–5.3)
POTASSIUM SERPL-SCNC: SIGNIFICANT CHANGE UP MMOL/L (ref 3.5–5.3)
PROT PATTERN SERPL ELPH-IMP: SIGNIFICANT CHANGE UP
PROT SERPL-MCNC: 8.1 G/DL — SIGNIFICANT CHANGE UP (ref 6–8.3)
RBC # BLD: 3.62 M/UL — LOW (ref 3.8–5.2)
RBC # FLD: 13.1 % — SIGNIFICANT CHANGE UP (ref 10.3–14.5)
SODIUM SERPL-SCNC: 134 MMOL/L — LOW (ref 135–145)
SODIUM SERPL-SCNC: SIGNIFICANT CHANGE UP MMOL/L (ref 135–145)
WBC # BLD: 3.16 K/UL — LOW (ref 3.8–10.5)
WBC # FLD AUTO: 3.16 K/UL — LOW (ref 3.8–10.5)

## 2020-09-24 RX ORDER — FAMOTIDINE 10 MG/ML
20 INJECTION INTRAVENOUS ONCE
Refills: 0 | Status: COMPLETED | OUTPATIENT
Start: 2020-09-24 | End: 2020-09-24

## 2020-09-24 RX ORDER — INSULIN GLARGINE 100 [IU]/ML
60 INJECTION, SOLUTION SUBCUTANEOUS AT BEDTIME
Refills: 0 | Status: DISCONTINUED | OUTPATIENT
Start: 2020-09-24 | End: 2020-09-25

## 2020-09-24 RX ORDER — IRON SUCROSE 20 MG/ML
200 INJECTION, SOLUTION INTRAVENOUS EVERY 24 HOURS
Refills: 0 | Status: DISCONTINUED | OUTPATIENT
Start: 2020-09-24 | End: 2020-09-26

## 2020-09-24 RX ORDER — DIPHENHYDRAMINE HCL 50 MG
25 CAPSULE ORAL ONCE
Refills: 0 | Status: COMPLETED | OUTPATIENT
Start: 2020-09-24 | End: 2020-09-24

## 2020-09-24 RX ORDER — INSULIN LISPRO 100/ML
18 VIAL (ML) SUBCUTANEOUS
Refills: 0 | Status: DISCONTINUED | OUTPATIENT
Start: 2020-09-24 | End: 2020-09-26

## 2020-09-24 RX ADMIN — HEPARIN SODIUM 5000 UNIT(S): 5000 INJECTION INTRAVENOUS; SUBCUTANEOUS at 05:18

## 2020-09-24 RX ADMIN — Medication 6: at 08:47

## 2020-09-24 RX ADMIN — IRON SUCROSE 110 MILLIGRAM(S): 20 INJECTION, SOLUTION INTRAVENOUS at 12:14

## 2020-09-24 RX ADMIN — ATORVASTATIN CALCIUM 40 MILLIGRAM(S): 80 TABLET, FILM COATED ORAL at 22:07

## 2020-09-24 RX ADMIN — Medication 88 MICROGRAM(S): at 05:46

## 2020-09-24 RX ADMIN — BUDESONIDE AND FORMOTEROL FUMARATE DIHYDRATE 2 PUFF(S): 160; 4.5 AEROSOL RESPIRATORY (INHALATION) at 22:07

## 2020-09-24 RX ADMIN — HEPARIN SODIUM 5000 UNIT(S): 5000 INJECTION INTRAVENOUS; SUBCUTANEOUS at 22:07

## 2020-09-24 RX ADMIN — Medication 14 UNIT(S): at 08:48

## 2020-09-24 RX ADMIN — HEPARIN SODIUM 5000 UNIT(S): 5000 INJECTION INTRAVENOUS; SUBCUTANEOUS at 13:12

## 2020-09-24 RX ADMIN — Medication 25 MILLIGRAM(S): at 17:34

## 2020-09-24 RX ADMIN — Medication 6: at 11:52

## 2020-09-24 RX ADMIN — FAMOTIDINE 20 MILLIGRAM(S): 10 INJECTION INTRAVENOUS at 17:34

## 2020-09-24 RX ADMIN — Medication 18 UNIT(S): at 11:52

## 2020-09-24 RX ADMIN — Medication 18 UNIT(S): at 17:35

## 2020-09-24 RX ADMIN — CEFTRIAXONE 100 MILLIGRAM(S): 500 INJECTION, POWDER, FOR SOLUTION INTRAMUSCULAR; INTRAVENOUS at 22:07

## 2020-09-24 RX ADMIN — BUDESONIDE AND FORMOTEROL FUMARATE DIHYDRATE 2 PUFF(S): 160; 4.5 AEROSOL RESPIRATORY (INHALATION) at 11:51

## 2020-09-24 RX ADMIN — PREGABALIN 1000 MICROGRAM(S): 225 CAPSULE ORAL at 12:15

## 2020-09-24 NOTE — PROGRESS NOTE ADULT - PROBLEM SELECTOR PLAN 1
-Patient admitted for sepsis secondary to UTI  -urine culture with e.coli  -blood cultures negative  -discontinued with meropenum and vancomycin  - ceftriaxone day 2  - afebrile  - ID Dr. Rojas consulted. -Patient admitted for sepsis secondary to UTI  -urine culture with e.coli  -blood cultures negative  - meropenum and vancomycin discontinued  - ceftriaxone day 2  - afebrile since yesterday afternoon.  - ID Dr. Rojas consulted.

## 2020-09-24 NOTE — PROGRESS NOTE ADULT - PROBLEM SELECTOR PLAN 5
-not in exacerbation  -Budesonide 40/4.5 mcgs 2 puffs po BID  -Albuterol PRN  -Pulmonology Dr. Fierro consulted. -HgA1c 7.5  -Blood sugar >250  -Humolog increased to 18 units TID  -Lantus increased to 60 units at night  -Moderate ISS  -continue accu checks ACHS  -Endocrine Dr. Johnson consulted.

## 2020-09-24 NOTE — PROGRESS NOTE ADULT - PROBLEM SELECTOR PLAN 2
-Patient Renal CA with mets to breast and pancreas, last chemo dose on 9.10.20.  -Hold on chemotherapy during admission  -Patient will follow with Dr. Pelaez on discharge  -Hem Onc Dr. Canela consulted. -Patient Renal CA with mets to breast and pancreas, last chemo dose on 9.10.20.  -Hold on chemotherapy during admission  -Patient will follow with Dr. Pelaez on discharge  -Hem Onc [facility] following.

## 2020-09-24 NOTE — PROGRESS NOTE ADULT - PROBLEM SELECTOR PLAN 6
-continue home atorvastatin and fenofibrate. -not in exacerbation  -Budesonide 40/4.5 mcgs 2 puffs po BID  -Albuterol PRN  -Pulmonology Dr. Fierro consulted.

## 2020-09-24 NOTE — PROGRESS NOTE ADULT - SUBJECTIVE AND OBJECTIVE BOX
complete note to follow · Subjective and Objective:    History of Present Illness:  Reason for Admission: sepsis  History of Present Illness:   59 y/o F from home, living alone with CDPAP program (niece Karina is a caregiver 5hrs/ day 5days/wk)  with a significant PMHx of HTN, DM and metastatic renal CA (since 2008, mets to breast and pancreas, on chemo x about a year every 2 weeks),  PSH of Left kidney removal in 2008 and bladder reconstruction in 2008, hysterectomy presents to the ED with nonproductive cough x 4 days associated with fever and chills. Patient's last chemo was 2 weeks ago. Patient seen by PMD Dr. Valdivia today, noted for fever 103F and was sent in for further evaluation. Patient relates to history of PNA 2 years ago. Reports slight decreased appetite with no nausea, vomiting, diarrhea or shortness of breath. Accompanied by her niece. Pt lost weight about 20 lbs in 6 months.    In ED;   , fever 102F, cefepime and vanco, NS bolus 1.7L was given. UA positive, CXR shows L basilar atelectasis, no infiltration.    OncHx: Pt well-known to our practice and follows with Oncologist Dr. Pelaez. Pt was diagnosed with Renal Cell Carcinoma in 2008, s/p left nephrectomy the same year. She then developed a pancreatic and a breast lesion both confirmed to be metastatic RCC and the patient was started on sutent. In 11/2016 her RCC was in remission and sutent was discontinued. in 05/2019 she developed another breast mass and the bx was c/w RCC and at that time started on Nivo/Ipi. She developed B/L breast lesions in 08/2020 and Bx confirmed met RCC, pt continued on Nivo/Ipi, last given 09/10/20    GOC: FULL CODE    Today: OOB to chair. No new complaints. No new events      Allergies and Intolerances:        Allergies:  	sulfa drugs: Drug Category, Hives    Home Medications:   * Outpatient Medication Status not yet specified  · 	ergocalciferol 50,000 intl units (1.25 mg) oral capsule: Last Dose Taken:  , 1 cap(s) orally once a week  · 	Basaglar KwikPen 100 units/mL subcutaneous solution: Last Dose Taken:  , 65 unit(s) subcutaneous once a day (in the evening) at 5pm  · 	Admelog SoloStar 100 units/mL injectable solution: Last Dose Taken:  , 20 unit(s) injectable once a day (in the morning)  · 	Admelog SoloStar 100 units/mL injectable solution: Last Dose Taken:  , 28 unit(s) injectable once a day (in the afternoon) at 12pm  · 	Admelog SoloStar 100 units/mL injectable solution: Last Dose Taken:  , 22 unit(s) injectable once a day (in the evening)  · 	metFORMIN 500 mg oral tablet: Last Dose Taken:  , 1 tab(s) orally 2 times a day  · 	levothyroxine 88 mcg (0.088 mg) oral tablet: Last Dose Taken:  , 1 tab(s) orally once a day  · 	fenofibrate 160 mg oral tablet: Last Dose Taken:  , 1 tab(s) orally once a day  · 	atorvastatin 80 mg oral tablet: Last Dose Taken:  , 1 tab(s) orally once a day    MEDICATIONS  (STANDING):  atorvastatin 80 milliGRAM(s) Oral at bedtime  ergocalciferol 25937 Unit(s) Oral <User Schedule>  heparin   Injectable 5000 Unit(s) SubCutaneous every 8 hours  insulin glargine Injectable (LANTUS) 32 Unit(s) SubCutaneous at bedtime  insulin lispro (HumaLOG) corrective regimen sliding scale   SubCutaneous three times a day before meals  insulin lispro Injectable (HumaLOG) 10 Unit(s) SubCutaneous three times a day before meals  levothyroxine 88 MICROGram(s) Oral <User Schedule>  meropenem  IVPB 1000 milliGRAM(s) IV Intermittent every 8 hours  sodium chloride 0.9%. 1000 milliLiter(s) (75 mL/Hr) IV Continuous <Continuous>  Triglide (fenofibrate) 160 milliGRAM(s) 160 milliGRAM(s) Oral daily  vancomycin  IVPB 1250 milliGRAM(s) IV Intermittent every 12 hours      MEDICATIONS  (PRN):  acetaminophen   Tablet .. 650 milliGRAM(s) Oral every 6 hours PRN Temp greater or equal to 38C (100.4F)      Patient History:    Past Medical, Past Surgical, and Family History:  PAST MEDICAL HISTORY:  DM (diabetes mellitus)   HTN (hypertension)   Renal cancer.     PAST SURGICAL HISTORY:  H/O bladder repair surgery   History of left nephrectomy 2008  S/P hysterectomy.     Social History:  Social History (marital status, living situation, occupation, tobacco use, alcohol and drug use, and sexual history): Pt denied smoking, alcohol use or illicit drug use.     Tobacco Screening:  · Core Measure Site	Yes  · Has the patient used tobacco in the past 30 days?	No    Risk Assessment:    Present on Admission:  Deep Venous Thrombosis	no  Pulmonary Embolus	no     Heart Failure:  Does this patient have a history of or has been diagnosed with heart failure? unknown.    HIV Screen (per [facility] Department of Health, HIV screening must be offered to every individual between ages 13 and 64)	Offered and patient accepted    Vitals:  Vital Signs Last 24 Hrs  T(C): 36.9 (24 Sep 2020 05:02), Max: 38.1 (23 Sep 2020 13:37)  T(F): 98.4 (24 Sep 2020 05:02), Max: 100.5 (23 Sep 2020 13:37)  HR: 89 (24 Sep 2020 05:02) (89 - 102)  BP: 123/60 (24 Sep 2020 05:02) (123/60 - 148/59)  BP(mean): --  ABP: --  ABP(mean): --  RR: 17 (24 Sep 2020 05:02) (17 - 18)  SpO2: 98% (24 Sep 2020 05:02) (98% - 100%)          ROS:  CONSTITUTIONAL:  no fever, H/A, no chills, sweats  HEENT:  Eyes:  No diplopia or blurred vision. No earache, sore throat or runny nose.  CARDIOVASCULAR:  No pressure, squeezing, tightness, or heaviness about the chest; no palpitations.  RESPIRATORY:  No cough, SOB  GASTROINTESTINAL:  No abdominal pain, nausea, vomiting or diarrhea.  GENITOURINARY:  No dysuria, frequency or urgency.  NEUROLOGIC:  No paresthesias, fasciculations, seizures or weakness.  PSYCHIATRIC:  No disorder of thought or mood.        PHYSICAL EXAMINATION:  GENERAL: OOB to chair, NAD   HEENT: Head is normocephalic and atraumatic. EOMI. Mucous membranes are moist.   NECK: Supple.   LUNGS: CTA B/L without wheezing, rales, or rhonchi. Respirations unlabored  HEART: S1S2 without murmur.  ABDOMEN: Soft, nontender, and nondistended. BS+  No hepatosplenomegaly is noted.  EXTREMITIES: Without any cyanosis, clubbing, rash, lesions or edema.  NEUROLOGIC: Grossly intact.    Labs:               CBC Full  -  ( 24 Sep 2020 07:02 )  WBC Count : 3.16 K/uL  RBC Count : 3.62 M/uL  Hemoglobin : 9.7 g/dL  Hematocrit : 29.5 %  Platelet Count - Automated : 166 K/uL  Mean Cell Volume : 81.5 fl  Mean Cell Hemoglobin : 26.8 pg  Mean Cell Hemoglobin Concentration : 32.9 gm/dL  Auto Neutrophil # : x  Auto Lymphocyte # : x  Auto Monocyte # : x  Auto Eosinophil # : x  Auto Basophil # : x  Auto Neutrophil % : x  Auto Lymphocyte % : x  Auto Monocyte % : x  Auto Eosinophil % : x  Auto Basophil % : x    09-24    134<L>  |  101  |  15  ----------------------------<  292<H>  3.9   |  25  |  1.30    Ca    8.8      24 Sep 2020 09:26        Radiology:  < from: CT Abdomen and Pelvis No Cont (09.21.20 @ 21:21) >  EXAM:  CT ABDOMEN AND PELVIS                          EXAM:  CT CHEST                            PROCEDURE DATE:  09/21/2020          INTERPRETATION:  CLINICAL INFORMATION: Cough, fever, sepsis. Acute kidney injury. Metastatic renal cancer with metastasis to the breast and pancreas, on chemotherapy. Status post left nephrectomy and bladder reconstruction.    COMPARISON: None.    PROCEDURE:  CT of the Chest, Abdomen and Pelvis was performed without intravenous contrast.  Intravenous contrast: None.  Oral contrast: None.  Sagittal and coronal reformats were performed.    FINDINGS:  CHEST:  LUNGS AND LARGE AIRWAYS: Patent central airways. 5 mm right middle lobe pulmonary nodule (2:87) evaluation of the lung parenchyma is limited by significantrespiratory motion.  PLEURA: No pleural effusion.  VESSELS: Within normal limits.  HEART: Heart size is normal. No pericardial effusion. Aortic valvular calcification.  MEDIASTINUM AND MADDY: Couple nonspecific small volume mediastinal nodes.  CHEST WALL AND LOWER NECK: 2.3 x 1.8 cm posterior right breast lesion containing what appears to be a postsurgical clip. Left supraclavicular lymphadenopathy. For example a 1.3 x 1.3 cm node (4:14)    ABDOMEN AND PELVIS:    Evaluation is limited by motion.  LIVER: Within normal limits.  BILE DUCTS: Normal caliber.  GALLBLADDER: Appears contracted.  SPLEEN: Within normal limits.  PANCREAS: 1.6 cm area of hypoattenuation within the pancreatic body (4:106) as well as ill-defined enlargement of the pancreatic head. No pancreatic ductal dilatation.  ADRENALS: Within normal limits.  KIDNEYS/URETERS: Status post left nephrectomy. No nuvia right-sided hydronephrosis.    BLADDER: Within normal limits.  REPRODUCTIVE ORGANS: Hysterectomy.    BOWEL: No bowel obstruction.  PERITONEUM: No ascites.  VESSELS: Within normal limits.  RETROPERITONEUM/LYMPH NODES: Retroperitoneal lymphadenopathy. For example a 1.5 x 1.1 cm left para-aortic node (4:114). Additional numerous small volume mesenteric nodes. Left pelvic sidewall node (4:174) measures 1.4 x 0.7 cm.  ABDOMINAL WALL: Postsurgical changes. Lower anterior abdominal wall nonobstructive bowel containing wid[place] hernia.  BONES: No aggressive osseous lesion..    IMPRESSION:  5 mm right middle lobe pulmonarynodule. No parenchymal consolidation or pleural effusion.    Left supraclavicular lymphadenopathy.    2.2 x 1.8 cm ovoid right posterior breast lesion, with associated surgical clip. Correlate with mammographic and postsurgical results.    Hypodensity within the pancreatic body as well as ill-defined and enlargement of the pancreatic head, concerning for underlying lesions and may correspond to patient's known pancreatic metastatic disease. Correlation with prior outside imaging, if available is recommended to evaluate for interval change. Evaluation is limited without the administration of intravenous contrast material.    Retroperitoneal lymphadenopathy.    Left nephrectomy. No nuvia right renal hydronephrosis. Evaluation is limited by motion.    2.3 x 1.8 cm posterior ovoid right breast lesion, with associated surgical clip. Correlate with mammographic findings and/or postsurgical results.          CHIP MOTTA M.D., RADIOLOGY RESIDENT  This document has been electronically signed.  CHIP MOTTA M.D., RADIOLOGY RESIDENT  This document has been electronically signed. Sep 22 2020 12:02AM    < end of copied text >

## 2020-09-24 NOTE — PROGRESS NOTE ADULT - SUBJECTIVE AND OBJECTIVE BOX
Patient is a 60y old  Female who presents with a chief complaint of sepsis (24 Sep 2020 08:33)  Awake, alert, comfortable out of bed in NAD    INTERVAL HPI/OVERNIGHT EVENTS:      VITAL SIGNS:  T(F): 98.4 (09-24-20 @ 05:02)  HR: 89 (09-24-20 @ 05:02)  BP: 123/60 (09-24-20 @ 05:02)  RR: 17 (09-24-20 @ 05:02)  SpO2: 98% (09-24-20 @ 05:02)  Wt(kg): --  I&O's Detail          REVIEW OF SYSTEMS:    CONSTITUTIONAL:  No fevers, chills, sweats    HEENT:  Eyes:  No diplopia or blurred vision. ENT:  No earache, sore throat or runny nose.    CARDIOVASCULAR:  No pressure, squeezing, tightness, or heaviness about the chest; no palpitations.    RESPIRATORY:  Per HPI    GASTROINTESTINAL:  No abdominal pain, nausea, vomiting or diarrhea.    GENITOURINARY:  No dysuria, frequency or urgency.    NEUROLOGIC:  No paresthesias, fasciculations, seizures or weakness.    PSYCHIATRIC:  No disorder of thought or mood.      PHYSICAL EXAM:    Constitutional: Well developed and nourished  Eyes:Perrla  ENMT: normal  Neck:supple  Respiratory: good air entry  Cardiovascular: S1 S2 regular  Gastrointestinal: Soft, Non tender  Extremities: No edema  Vascular:normal  Neurological:Awake, alert,Ox3  Musculoskeletal:Normal      MEDICATIONS  (STANDING):  atorvastatin 40 milliGRAM(s) Oral at bedtime  budesonide  80 MICROgram(s)/formoterol 4.5 MICROgram(s) Inhaler 2 Puff(s) Inhalation two times a day  cefTRIAXone   IVPB 2000 milliGRAM(s) IV Intermittent every 24 hours  cyanocobalamin Injectable 1000 MICROGram(s) IntraMuscular once  ergocalciferol 35569 Unit(s) Oral <User Schedule>  heparin   Injectable 5000 Unit(s) SubCutaneous every 8 hours  insulin glargine Injectable (LANTUS) 50 Unit(s) SubCutaneous at bedtime  insulin lispro (HumaLOG) corrective regimen sliding scale   SubCutaneous three times a day before meals  insulin lispro Injectable (HumaLOG) 14 Unit(s) SubCutaneous three times a day before meals  levothyroxine 88 MICROGram(s) Oral <User Schedule>  pantoprazole    Tablet 40 milliGRAM(s) Oral before breakfast  sodium chloride 0.9%. 1000 milliLiter(s) (75 mL/Hr) IV Continuous <Continuous>  Triglide (fenofibrate) 160 milliGRAM(s) 160 milliGRAM(s) Oral daily    MEDICATIONS  (PRN):  acetaminophen   Tablet .. 650 milliGRAM(s) Oral every 6 hours PRN Temp greater or equal to 38C (100.4F)  ALBUTerol    90 MICROgram(s) HFA Inhaler 2 Puff(s) Inhalation every 6 hours PRN Bronchospasm      Allergies    sulfa drugs (Hives)    Intolerances        LABS:                        9.7    3.16  )-----------( 166      ( 24 Sep 2020 07:02 )             29.5     09-24    SEE NOTE  |  SEE NOTE  |  SEE NOTE  ----------------------------<  SEE NOTE  SEE NOTE   |  SEE NOTE  |  SEE NOTE    Ca    SEE NOTE      24 Sep 2020 07:02    cCulture - Blood (09.21.20 @ 22:06)   Specimen Source: .Blood Blood-Peripheral   Culture Results:   No growth to date.             CAPILLARY BLOOD GLUCOSE      POCT Blood Glucose.: 299 mg/dL (24 Sep 2020 08:13)  POCT Blood Glucose.: 364 mg/dL (23 Sep 2020 21:45)  POCT Blood Glucose.: 243 mg/dL (23 Sep 2020 16:54)  POCT Blood Glucose.: 225 mg/dL (23 Sep 2020 11:54)        RADIOLOGY & ADDITIONAL TESTS:uCulture Results:   >100,000 CFU/ml Escherichia coli     CXR:    Ct scan chest:    ekg;    echo:

## 2020-09-24 NOTE — PROGRESS NOTE ADULT - SUBJECTIVE AND OBJECTIVE BOX
Patient is a 60y old  Female who presents with a chief complaint of sepsis (23 Sep 2020 19:32)    pt seen in ed [  ], reg med floor [ x  ], bed [ x ], chair at bedside [   ], a+o x3 [ x ], lethargic [  ],    nad [ x ]      Allergies    sulfa drugs (Hives)        Vitals    T(F): 98.4 (09-24-20 @ 05:02), Max: 100.5 (09-23-20 @ 13:37)  HR: 89 (09-24-20 @ 05:02) (89 - 102)  BP: 123/60 (09-24-20 @ 05:02) (123/60 - 148/59)  RR: 17 (09-24-20 @ 05:02) (17 - 18)  SpO2: 98% (09-24-20 @ 05:02) (98% - 100%)  Wt(kg): --  CAPILLARY BLOOD GLUCOSE      POCT Blood Glucose.: 364 mg/dL (23 Sep 2020 21:45)      Labs                          10.4   4.79  )-----------( 154      ( 23 Sep 2020 07:47 )             32.9       09-23    135  |  105  |  12  ----------------------------<  271<H>  4.0   |  21<L>  |  1.26    Ca    8.8      23 Sep 2020 07:47              .Blood Blood-Peripheral  09-21 @ 22:06   No growth to date.  --  --      .Urine Clean Catch (Midstream)  09-21 @ 18:57   >100,000 CFU/ml Escherichia coli  --  Escherichia coli          Radiology Results      Meds    MEDICATIONS  (STANDING):  atorvastatin 40 milliGRAM(s) Oral at bedtime  budesonide  80 MICROgram(s)/formoterol 4.5 MICROgram(s) Inhaler 2 Puff(s) Inhalation two times a day  cefTRIAXone   IVPB 2000 milliGRAM(s) IV Intermittent every 24 hours  cyanocobalamin Injectable 1000 MICROGram(s) IntraMuscular once  ergocalciferol 27256 Unit(s) Oral <User Schedule>  heparin   Injectable 5000 Unit(s) SubCutaneous every 8 hours  insulin glargine Injectable (LANTUS) 50 Unit(s) SubCutaneous at bedtime  insulin lispro (HumaLOG) corrective regimen sliding scale   SubCutaneous three times a day before meals  insulin lispro Injectable (HumaLOG) 14 Unit(s) SubCutaneous three times a day before meals  levothyroxine 88 MICROGram(s) Oral <User Schedule>  pantoprazole    Tablet 40 milliGRAM(s) Oral before breakfast  sodium chloride 0.9%. 1000 milliLiter(s) (75 mL/Hr) IV Continuous <Continuous>  Triglide (fenofibrate) 160 milliGRAM(s) 160 milliGRAM(s) Oral daily      MEDICATIONS  (PRN):  acetaminophen   Tablet .. 650 milliGRAM(s) Oral every 6 hours PRN Temp greater or equal to 38C (100.4F)  ALBUTerol    90 MICROgram(s) HFA Inhaler 2 Puff(s) Inhalation every 6 hours PRN Bronchospasm      Physical Exam    Neuro :  no focal deficits  Respiratory: CTA B/L  CV: RRR, S1S2, no murmurs,   Abdominal: Soft, NT, ND +BS,  Extremities: No edema, + peripheral pulses    ASSESSMENT    Urinary tract infection   sepsis,   acute bronchitis,   pulm nodule  s/p paola,   h/o metastatic renal CA (since 2008, mets to breast and pancreas, on chemo x about a year every 2 weeks),    DM (diabetes mellitus)  HTN (hypertension)  PSH of Left kidney removal in 2008 and bladder reconstruction in 2008,  S/P hysterectomy        PLAN    cont merem and vanco  id f/u   blood cx neg noted above   ucx with e coli noted above  cont  bronchodilators,   pulm f/u   symbicort 80/4.5 mcgs 2 puffs po BID  pfts as OP.   ct chest abd pelv noted    heme onc f/u   s/p sutent and currently on immunotherapy with Nivo/Ipi  tx last given 9/10/20  Hold all Onc tx during admission  pt denies diarrhea/SOB  Pulse Ox 100% on RA  CT c/w known breast and pancreatic masses Bx proven RCC  upon discharge pt to f/u with Oncologist Dr. Pelaez  supplement vit b12  hgba1c 7.5 noted above  endo f/u   cont  Humalog 10 units TID and increase Lantus to 40 units   serum creat wnl  cont current med   Patient is a 60y old  Female who presents with a chief complaint of sepsis (23 Sep 2020 19:32)    pt seen in ed [  ], reg med floor [ x  ], bed [ x ], chair at bedside [   ], a+o x3 [ x ], lethargic [  ],    nad [ x ]      Allergies    sulfa drugs (Hives)        Vitals    T(F): 98.4 (09-24-20 @ 05:02), Max: 100.5 (09-23-20 @ 13:37)  HR: 89 (09-24-20 @ 05:02) (89 - 102)  BP: 123/60 (09-24-20 @ 05:02) (123/60 - 148/59)  RR: 17 (09-24-20 @ 05:02) (17 - 18)  SpO2: 98% (09-24-20 @ 05:02) (98% - 100%)  Wt(kg): --  CAPILLARY BLOOD GLUCOSE      POCT Blood Glucose.: 364 mg/dL (23 Sep 2020 21:45)      Labs                          10.4   4.79  )-----------( 154      ( 23 Sep 2020 07:47 )             32.9       09-23    135  |  105  |  12  ----------------------------<  271<H>  4.0   |  21<L>  |  1.26    Ca    8.8      23 Sep 2020 07:47              .Blood Blood-Peripheral  09-21 @ 22:06   No growth to date.  --  --      .Urine Clean Catch (Midstream)  09-21 @ 18:57   >100,000 CFU/ml Escherichia coli  --  Escherichia coli          Radiology Results      Meds    MEDICATIONS  (STANDING):  atorvastatin 40 milliGRAM(s) Oral at bedtime  budesonide  80 MICROgram(s)/formoterol 4.5 MICROgram(s) Inhaler 2 Puff(s) Inhalation two times a day  cefTRIAXone   IVPB 2000 milliGRAM(s) IV Intermittent every 24 hours  cyanocobalamin Injectable 1000 MICROGram(s) IntraMuscular once  ergocalciferol 38820 Unit(s) Oral <User Schedule>  heparin   Injectable 5000 Unit(s) SubCutaneous every 8 hours  insulin glargine Injectable (LANTUS) 50 Unit(s) SubCutaneous at bedtime  insulin lispro (HumaLOG) corrective regimen sliding scale   SubCutaneous three times a day before meals  insulin lispro Injectable (HumaLOG) 14 Unit(s) SubCutaneous three times a day before meals  levothyroxine 88 MICROGram(s) Oral <User Schedule>  pantoprazole    Tablet 40 milliGRAM(s) Oral before breakfast  sodium chloride 0.9%. 1000 milliLiter(s) (75 mL/Hr) IV Continuous <Continuous>  Triglide (fenofibrate) 160 milliGRAM(s) 160 milliGRAM(s) Oral daily      MEDICATIONS  (PRN):  acetaminophen   Tablet .. 650 milliGRAM(s) Oral every 6 hours PRN Temp greater or equal to 38C (100.4F)  ALBUTerol    90 MICROgram(s) HFA Inhaler 2 Puff(s) Inhalation every 6 hours PRN Bronchospasm      Physical Exam    Neuro :  no focal deficits  Respiratory: CTA B/L  CV: RRR, S1S2, no murmurs,   Abdominal: Soft, NT, ND +BS,  Extremities: No edema, + peripheral pulses    ASSESSMENT    Urinary tract infection   sepsis,   acute bronchitis,   pulm nodule  s/p paola,   h/o metastatic renal CA (since 2008, mets to breast and pancreas, on chemo x about a year every 2 weeks),    DM (diabetes mellitus)  HTN (hypertension)  PSH of Left kidney removal in 2008 and bladder reconstruction in 2008,  S/P hysterectomy        PLAN      blood cx neg noted above   ucx and sens with e coli noted above  id f/u   change abx to Ceftriaxone since cx is sensitive  cont  bronchodilators,   pulm f/u   symbicort 80/4.5 mcgs 2 puffs po BID  pfts as OP.   ct chest abd pelv noted    heme onc f/u   s/p sutent and currently on immunotherapy with Nivo/Ipi  tx last given 9/10/20  Hold all Onc tx during admission  pt denies diarrhea/SOB  Pulse Ox 100% on RA  CT c/w known breast and pancreatic masses Bx proven RCC   Venofer 200mg IV qd x 4d (can complete pending doses as an outpt when pt ready for discharge)  upon discharge pt to f/u with Oncologist Dr. Pelaez  supplement vit b12   h/h stable  hgba1c 7.5 noted above  endo f/u   Lantus increased to 50 units and Humalog 14 units TID   serum creat wnl  cont current med

## 2020-09-24 NOTE — PROGRESS NOTE ADULT - ASSESSMENT
Patient is a 60y old  Female from home, living alone with [facility] (mohamud Collins is a caregiver 5hrs/ day 5days/wk)  with a significant PMHx of HTN, DM and metastatic renal CA (since 2008, mets to breast and pancreas, on chemo x about a year every 2 weeks),  PSH of Left kidney removal in 2008 and bladder reconstruction in 2008, hysterectomy, now presents to the ER for evaluation of nonproductive cough, fever and chills. Patient's last chemo was 2 weeks ago. Patient seen by PMD Dr. Valdivia today, noted for fever 103F and was sent in for further evaluation. On admission, he found to have fever, tachycardia, positive Urine analysis and negative CXR. She has started on Meropenem and Vancomycin, and the ID consult requested to assist with further evaluation and antibiotic management.     # Sepsis ( Fever + tachycardia )- resolving, still spiking fever   # UTI  - UCX E.coli  # Metastatic Renal cancer- s/p  Left nephrectomy on chemo therapy, last one was about 2 weeks ago  # COVID 19 is negative    would recommend:    1. Monitor Temp. and c/w supportive care  2. Continue  Vancomycin since no evidence of MRSA  3. Change Meropenem to Ceftriaxone since its sensitive to E.coli  4. Aspiration precaution  5. Pain management as needed    d/w patient and Nursing staff    Attending Attestation:    Spent more than 45 minutes on total encounter, more than 50 % of the visit was spent counseling and/or coordinating care by the Attending physician.   Patient is a 60y old  Female from home, [facility] (mohamud Collins is a caregiver 5hrs/ day 5days/wk)  with a significant PMHx of HTN, DM and metastatic renal CA (since 2008, mets to breast and pancreas, on chemo x about a year every 2 weeks),  PSH of Left kidney removal in 2008 and bladder reconstruction in 2008, hysterectomy, now presents to the ER for evaluation of nonproductive cough, fever and chills. Patient's last chemo was 2 weeks ago. Patient seen by PMD Dr. Valdivia today, noted for fever 103F and was sent in for further evaluation. On admission, he found to have fever, tachycardia, positive Urine analysis and negative CXR. She has started on Meropenem and Vancomycin, and the ID consult requested to assist with further evaluation and antibiotic management.     # Sepsis ( Fever + tachycardia )- resolving, still spiking fever   # UTI  - UCX E.coli  # Metastatic Renal cancer- s/p  Left nephrectomy on chemo therapy, last one was about 2 weeks ago  # COVID 19 is negative    would recommend:    1. Continue Ceftriaxone inpatient and may change to oral Augmentin 875mg q 12hours on discharge to continue until 9/29/20  2. OOB to chair   3. Pain management as needed    d/w patient and Covering NP, Wandy    Attending Attestation:    Spent more than 40 minutes on total encounter, more than 50 % of the visit was spent counseling and/or coordinating care by the Attending physician.

## 2020-09-24 NOTE — PROGRESS NOTE ADULT - SUBJECTIVE AND OBJECTIVE BOX
Patient is seen and examined at the bed side, is afebrile now, spiked fever again. The Blood cultures have no growth to date  and the Urine culture grew E.coli, intermediate resistant to Levaquin and Cipro.        REVIEW OF SYSTEMS: All other review systems are negative        ALLERGIES: sulfa drugs (Hives)        Vital Signs Last 24 Hrs  T(C): 37.7 (24 Sep 2020 13:50), Max: 37.7 (24 Sep 2020 13:50)  T(F): 99.8 (24 Sep 2020 13:50), Max: 99.8 (24 Sep 2020 13:50)  HR: 90 (24 Sep 2020 13:50) (89 - 96)  BP: 103/66 (24 Sep 2020 13:50) (103/66 - 145/61)  BP(mean): --  RR: 16 (24 Sep 2020 13:50) (16 - 18)  SpO2: 96% (24 Sep 2020 13:50) (96% - 100%)        PHYSICAL EXAM:  GENERAL: Not in distress   CHEST/LUNG: Not using accessory muscles  HEART: s1 and s2 present  ABDOMEN:  Nontender and  Nondistended  EXTREMITIES: No pedal  edema  CNS: Awake and Alert        LABS:                        9.7    3.16  )-----------( 166      ( 24 Sep 2020 07:02 )             29.5                           10.4   4.79  )-----------( 154      ( 23 Sep 2020 07:47 )             32.9         09-24    134<L>  |  101  |  15  ----------------------------<  292<H>  3.9   |  25  |  1.30    Ca    8.8      24 Sep 2020 09:26      09-23    135  |  105  |  12  ----------------------------<  271<H>  4.0   |  21<L>  |  1.26    Ca    8.8      23 Sep 2020 07:47  Phos  2.1     09-22  Mg     1.6     -        09-21    134<L>  |  104  |  14  ----------------------------<  138<H>  4.0   |  26  |  1.37<H>    Ca    9.7      21 Sep 2020 14:00    TPro  8.1  /  Alb  3.4<L>  /  TBili  0.4  /  DBili  x   /  AST  39  /  ALT  30  /  AlkPhos  70    PT/INR - ( 21 Sep 2020 14:00 )   PT: 13.4 sec;   INR: 1.15 ratio    PTT - ( 21 Sep 2020 14:00 )  PTT:30.6 sec      CAPILLARY BLOOD GLUCOSE  POCT Blood Glucose.: 234 mg/dL (21 Sep 2020 17:32)      Urinalysis Basic - ( 21 Sep 2020 14:00 )  Color: Yellow / Appearance: Clear / S.010 / pH: x  Gluc: x / Ketone: Negative  / Bili: Negative / Urobili: Negative   Blood: x / Protein: Negative / Nitrite: Positive   Leuk Esterase: Moderate / RBC: 2-5 /HPF / WBC >50 /HPF   Sq Epi: x / Non Sq Epi: Moderate /HPF / Bacteria: Many /HPF        MEDICATIONS  (STANDING):    atorvastatin 40 milliGRAM(s) Oral at bedtime  budesonide  80 MICROgram(s)/formoterol 4.5 MICROgram(s) Inhaler 2 Puff(s) Inhalation two times a day  cefTRIAXone   IVPB 2000 milliGRAM(s) IV Intermittent every 24 hours  ergocalciferol 20585 Unit(s) Oral <User Schedule>  heparin   Injectable 5000 Unit(s) SubCutaneous every 8 hours  insulin glargine Injectable (LANTUS) 60 Unit(s) SubCutaneous at bedtime  insulin lispro (HumaLOG) corrective regimen sliding scale   SubCutaneous three times a day before meals  insulin lispro Injectable (HumaLOG) 18 Unit(s) SubCutaneous three times a day before meals  iron sucrose IVPB 200 milliGRAM(s) IV Intermittent every 24 hours  levothyroxine 88 MICROGram(s) Oral <User Schedule>  pantoprazole    Tablet 40 milliGRAM(s) Oral before breakfast  sodium chloride 0.9%. 1000 milliLiter(s) (75 mL/Hr) IV Continuous <Continuous>  Triglide (fenofibrate) 160 milliGRAM(s) 160 milliGRAM(s) Oral daily        RADIOLOGY & ADDITIONAL TESTS:    20 : CT Abdomen and Pelvis No Cont (20 @ 21:21) 5 mm right middle lobe pulmonary nodule. No parenchymal consolidation or pleural effusion.    Left supraclavicular lymphadenopathy.    2.2 x 1.8 cm ovoid right posterior breast lesion, with associated surgical clip. Correlate with mammographic and postsurgical results.    Hypodensity within the pancreatic body as well as ill-defined and enlargement of the pancreatic head, concerning for underlying lesions and may correspond to patient's known pancreatic metastatic disease. Correlation with prior outside imaging, if available is recommended to evaluate for interval change. Evaluation is limited without the administration of intravenous contrast material.    Retroperitoneal lymphadenopathy.    Left nephrectomy. No nuvia right renal hydronephrosis. Evaluation is limited by motion.    2.3 x 1.8 cm posterior ovoid right breast lesion, with associated surgical clip. Correlate with mammographic findings and/or postsurgical results.          20: CT Chest No Cont (20 @ 21:21) LUNGS AND LARGE AIRWAYS: Patent central airways. 5 mm right middle lobe pulmonary nodule (2:87) evaluation of the lung parenchyma is limited by significantrespiratory motion.  PLEURA: No pleural effusion.    VESSELS: Within normal limits.      20 : Xray Chest 1 View-PORTABLE IMMEDIATE (20 @ 13:41) : No evidence for pulmonary consolidation, pleural effusion or pneumothorax.    Small left basilar atelectasis.  The trachea is midline.   The cardiac silhouette is magnified by AP technique.        MICROBIOLOGY DATA:      Culture - Blood (20 @ 22:06)   Specimen Source: .Blood Blood-Peripheral   Culture Results:   No growth to date.     Culture - Blood (20 @ 22:06)   Specimen Source: .Blood Blood-Peripheral   Culture Results:   No growth to date.     Culture - Urine (20 @ 18:57)   - Amikacin: S <=16   - Amoxicillin/Clavulanic Acid: S <=8/4   - Ampicillin: S <=8 These ampicillin results predict results for amoxicillin   - Ampicillin/Sulbactam: S <=4/2 Enterobacter, Citrobacter, and Serratia may develop resistance during prolonged therapy (3-4 days)   - Aztreonam: S <=4   - Cefazolin: S <=2 (MIC_CL_COM_ENTERIC_CEFAZU) For uncomplicated UTI with K. pneumoniae, E. coli, or P. mirablis: STONEY <=16 is sensitive and STONEY >=32 is resistant. This also predicts results for oral agents cefaclor, cefdinir, cefpodoxime, cefprozil, cefuroxime axetil, cephalexin and locarbef for uncomplicated UTI. Note that some isolates may be susceptible to these agents while testing resistant to cefazolin.   - Cefepime: S <=2   - Cefoxitin: S <=8   - Ceftriaxone: S <=1 Enterobacter, Citrobacter, and Serratia may develop resistance during prolonged therapy   - Ciprofloxacin: I 0.5   - Ertapenem: S <=0.5   - Gentamicin: S <=2   - Imipenem: S <=1   - Levofloxacin: I 1   - Meropenem: S <=1   - Nitrofurantoin: S <=32 Should not be used to treat pyelonephritis   - Piperacillin/Tazobactam: S <=8   - Tigecycline: S <=2   - Tobramycin: S <=2   - Trimethoprim/Sulfamethoxazole: R >   Specimen Source: .Urine Clean Catch (Midstream)   Culture Results:   >100,000 CFU/ml Escherichia coli   Organism Identification: Escherichia coli   Organism: Escherichia coli   Method Type: STONEY   Respiratory Viral/Bacti Detection by RUTHY (20 @ 20:56)   Rapid RVP Result: NotDetec   COVID-19 PCR . (20 @ 14:00)   COVID-19 PCR: NotDetec: Testing is performed using polymerase chain reaction (PCR) or   transcription mediated amplification (TMA).    Culture - Urine (20 @ 18:57)   Specimen Source: .Urine Clean Catch (Midstream)   Culture Results:   >100,000 CFU/ml Escherichia coli              Patient is seen and examined at the bed side, is afebrile.  She mentioned feeling better today.         REVIEW OF SYSTEMS: All other review systems are negative        ALLERGIES: sulfa drugs (Hives)        Vital Signs Last 24 Hrs  T(C): 37.7 (24 Sep 2020 13:50), Max: 37.7 (24 Sep 2020 13:50)  T(F): 99.8 (24 Sep 2020 13:50), Max: 99.8 (24 Sep 2020 13:50)  HR: 90 (24 Sep 2020 13:50) (89 - 96)  BP: 103/66 (24 Sep 2020 13:50) (103/66 - 145/61)  BP(mean): --  RR: 16 (24 Sep 2020 13:50) (16 - 18)  SpO2: 96% (24 Sep 2020 13:50) (96% - 100%)        PHYSICAL EXAM:  GENERAL: Not in distress   CHEST/LUNG: Not using accessory muscles  HEART: s1 and s2 present  ABDOMEN:  Nontender and  Nondistended  EXTREMITIES: No pedal  edema  CNS: Awake and Alert        LABS:                        9.7    3.16  )-----------( 166      ( 24 Sep 2020 07:02 )             29.5                           10.4   4.79  )-----------( 154      ( 23 Sep 2020 07:47 )             32.9         09-24    134<L>  |  101  |  15  ----------------------------<  292<H>  3.9   |  25  |  1.30    Ca    8.8      24 Sep 2020 09:26      09-23    135  |  105  |  12  ----------------------------<  271<H>  4.0   |  21<L>  |  1.26    Ca    8.8      23 Sep 2020 07:47  Phos  2.1     09-22  Mg     1.6     -        09-21    134<L>  |  104  |  14  ----------------------------<  138<H>  4.0   |  26  |  1.37<H>    Ca    9.7      21 Sep 2020 14:00    TPro  8.1  /  Alb  3.4<L>  /  TBili  0.4  /  DBili  x   /  AST  39  /  ALT  30  /  AlkPhos  70    PT/INR - ( 21 Sep 2020 14:00 )   PT: 13.4 sec;   INR: 1.15 ratio    PTT - ( 21 Sep 2020 14:00 )  PTT:30.6 sec      CAPILLARY BLOOD GLUCOSE  POCT Blood Glucose.: 234 mg/dL (21 Sep 2020 17:32)      Urinalysis Basic - ( 21 Sep 2020 14:00 )  Color: Yellow / Appearance: Clear / S.010 / pH: x  Gluc: x / Ketone: Negative  / Bili: Negative / Urobili: Negative   Blood: x / Protein: Negative / Nitrite: Positive   Leuk Esterase: Moderate / RBC: 2-5 /HPF / WBC >50 /HPF   Sq Epi: x / Non Sq Epi: Moderate /HPF / Bacteria: Many /HPF        MEDICATIONS  (STANDING):    atorvastatin 40 milliGRAM(s) Oral at bedtime  budesonide  80 MICROgram(s)/formoterol 4.5 MICROgram(s) Inhaler 2 Puff(s) Inhalation two times a day  cefTRIAXone   IVPB 2000 milliGRAM(s) IV Intermittent every 24 hours  ergocalciferol 43337 Unit(s) Oral <User Schedule>  heparin   Injectable 5000 Unit(s) SubCutaneous every 8 hours  insulin glargine Injectable (LANTUS) 60 Unit(s) SubCutaneous at bedtime  insulin lispro (HumaLOG) corrective regimen sliding scale   SubCutaneous three times a day before meals  insulin lispro Injectable (HumaLOG) 18 Unit(s) SubCutaneous three times a day before meals  iron sucrose IVPB 200 milliGRAM(s) IV Intermittent every 24 hours  levothyroxine 88 MICROGram(s) Oral <User Schedule>  pantoprazole    Tablet 40 milliGRAM(s) Oral before breakfast  sodium chloride 0.9%. 1000 milliLiter(s) (75 mL/Hr) IV Continuous <Continuous>  Triglide (fenofibrate) 160 milliGRAM(s) 160 milliGRAM(s) Oral daily        RADIOLOGY & ADDITIONAL TESTS:    20 : CT Abdomen and Pelvis No Cont (20 @ 21:21) 5 mm right middle lobe pulmonary nodule. No parenchymal consolidation or pleural effusion.    Left supraclavicular lymphadenopathy.    2.2 x 1.8 cm ovoid right posterior breast lesion, with associated surgical clip. Correlate with mammographic and postsurgical results.    Hypodensity within the pancreatic body as well as ill-defined and enlargement of the pancreatic head, concerning for underlying lesions and may correspond to patient's known pancreatic metastatic disease. Correlation with prior outside imaging, if available is recommended to evaluate for interval change. Evaluation is limited without the administration of intravenous contrast material.    Retroperitoneal lymphadenopathy.    Left nephrectomy. No nuvia right renal hydronephrosis. Evaluation is limited by motion.    2.3 x 1.8 cm posterior ovoid right breast lesion, with associated surgical clip. Correlate with mammographic findings and/or postsurgical results.          20: CT Chest No Cont (20 @ 21:21) LUNGS AND LARGE AIRWAYS: Patent central airways. 5 mm right middle lobe pulmonary nodule (2:87) evaluation of the lung parenchyma is limited by significantrespiratory motion.  PLEURA: No pleural effusion.    VESSELS: Within normal limits.      20 : Xray Chest 1 View-PORTABLE IMMEDIATE (20 @ 13:41) : No evidence for pulmonary consolidation, pleural effusion or pneumothorax.    Small left basilar atelectasis.  The trachea is midline.   The cardiac silhouette is magnified by AP technique.        MICROBIOLOGY DATA:      Culture - Blood (20 @ 22:06)   Specimen Source: .Blood Blood-Peripheral   Culture Results:   No growth to date.     Culture - Blood (20 @ 22:06)   Specimen Source: .Blood Blood-Peripheral   Culture Results:   No growth to date.     Culture - Urine (20 @ 18:57)   - Amikacin: S <=16   - Amoxicillin/Clavulanic Acid: S <=8/4   - Ampicillin: S <=8 These ampicillin results predict results for amoxicillin   - Ampicillin/Sulbactam: S <=4/2 Enterobacter, Citrobacter, and Serratia may develop resistance during prolonged therapy (3-4 days)   - Aztreonam: S <=4   - Cefazolin: S <=2 (MIC_CL_COM_ENTERIC_CEFAZU) For uncomplicated UTI with K. pneumoniae, E. coli, or P. mirablis: STONEY <=16 is sensitive and STONEY >=32 is resistant. This also predicts results for oral agents cefaclor, cefdinir, cefpodoxime, cefprozil, cefuroxime axetil, cephalexin and locarbef for uncomplicated UTI. Note that some isolates may be susceptible to these agents while testing resistant to cefazolin.   - Cefepime: S <=2   - Cefoxitin: S <=8   - Ceftriaxone: S <=1 Enterobacter, Citrobacter, and Serratia may develop resistance during prolonged therapy   - Ciprofloxacin: I 0.5   - Ertapenem: S <=0.5   - Gentamicin: S <=2   - Imipenem: S <=1   - Levofloxacin: I 1   - Meropenem: S <=1   - Nitrofurantoin: S <=32 Should not be used to treat pyelonephritis   - Piperacillin/Tazobactam: S <=8   - Tigecycline: S <=2   - Tobramycin: S <=2   - Trimethoprim/Sulfamethoxazole: R >   Specimen Source: .Urine Clean Catch (Midstream)   Culture Results:   >100,000 CFU/ml Escherichia coli   Organism Identification: Escherichia coli   Organism: Escherichia coli   Method Type: STONEY   Respiratory Viral/Bacti Detection by RUTHY (20 @ 20:56)   Rapid RVP Result: NotDetec   COVID-19 PCR . (20 @ 14:00)   COVID-19 PCR: NotDetec: Testing is performed using polymerase chain reaction (PCR) or   transcription mediated amplification (TMA).    Culture - Urine (20 @ 18:57)   Specimen Source: .Urine Clean Catch (Midstream)   Culture Results:   >100,000 CFU/ml Escherichia coli

## 2020-09-24 NOTE — PROGRESS NOTE ADULT - PROBLEM SELECTOR PLAN 4
-HgA1c 7.5  -Blood sugar >250  -increased humolog and lantus  -Humolog 14 units TID  -Lantus 50 units at night  -Moderate ISS  -continue accu checks ACHS  -Endocrine Dr. Johnson consulted. -JAX likely prerenal insetting of dehydration and infection  -JAX resolved  -avoid nephrotoxins.

## 2020-09-24 NOTE — PROGRESS NOTE ADULT - ASSESSMENT
59 y/o F from home, living alone with [facility] program (mohamud Collins is a caregiver 5hrs/ day 5days/wk)  with a significant PMHx of HTN, DM and metastatic renal CA (since 2008, mets to breast and pancreas, on chemo x about a year every 2 weeks),  PSH of Left kidney removal in 2008 and bladder reconstruction in 2008, hysterectomy presents to the ED with nonproductive cough x 4 days associated with fever and chills. Admitted for Sepsis secondary to UTI.     Endocrinology service was consulted given uncontrolled blood sugars during hospital stay.     1. DM with uncontrolled blood sugar:   -Patient has hx of DM, takes Basaglar 65 units at night, Admelog 20 in morning, 28 afternoon and 22 in evening at home.   with occasional hypoglycemia  -HbA1C: 7.5%  -Blood sugar this morning was noted to be 299 this morning.   -Would increase Lantus to 60 units and Humalog 18 units TID   -C/w moderate HSS  d/w hs    2. Hypothyroidism:    -Patient has hx of hypothyroidism, on levothyroxine   TSH mildly elevated to 6.34    Free T4 wnl   Would c/w levothyroxine home dose 59 y/o F from home, living alone with [facility] program (mohamud Collins is a caregiver 5hrs/ day 5days/wk)  with a significant PMHx of HTN, DM and metastatic renal CA (since 2008, mets to breast and pancreas, on chemo x about a year every 2 weeks),  PSH of Left kidney removal in 2008 and bladder reconstruction in 2008, hysterectomy presents to the ED with nonproductive cough x 4 days associated with fever and chills. Admitted for Sepsis secondary to UTI.     Endocrinology service was consulted given uncontrolled blood sugars during hospital stay.     1. DM with uncontrolled blood sugar:   -Patient has hx of DM, takes Basaglar 65 units at night, Admelog 20 in morning, 28 afternoon and 22 in evening at home.   with occasional hypoglycemia  -HbA1C: 7.5%  -Blood sugar this morning was noted to be 299 this morning.   -Would increase Lantus to 60 units and Humalog 18 units TID   -C/w moderate HSS  d/w hs.    2. Hypothyroidism:    -Patient has hx of hypothyroidism, on levothyroxine   TSH mildly elevated to 6.34    Free T4 wnl   Would c/w levothyroxine home dose

## 2020-09-24 NOTE — CHART NOTE - NSCHARTNOTEFT_GEN_A_CORE
Patient seen and examined at bedside. Patient complain to nurse about rash to B/L anterior forearms. Physical exam erythremia localized to b/l anterior forearm, no SOB, difficulty breathing, angioedema on assessment. Discussed with Dr. Pleitez. Patient given Diphenhydramine 25 mg IV x1 and Famotidine 20mg IVx1. Patient stable in no distress.

## 2020-09-24 NOTE — PROGRESS NOTE ADULT - PROBLEM SELECTOR PLAN 3
-JAX likely prerenal insetting of dehydration and infection  -JAX resolved  -avoid nephrotoxins. -Etiology JAX/Malignancy/B12 deficieny  -B12 low 338  -Hem Onc recommending Venofer 200mg IV daily and B12 1000mcg IM x1, can complete doses as outpatient on discharge. -Etiology JAX/Malignancy/B12 deficieny  -B12 low 338, S/P B12 1000mcg IM x1  -Hem Onc recommending Venofer 200mg IV daily x 4 days (can complete doses as outpatient if discharged prior to completion.)

## 2020-09-24 NOTE — PROGRESS NOTE ADULT - ASSESSMENT
Assessment and Recommendation:   · Assessment	  OncHx: Pt well-known to our practice and follows with Oncologist Dr. Pelaez. Pt was diagnosed with Renal Cell Carcinoma in 2008, s/p left nephrectomy the same year. She then developed a pancreatic and a breast lesion both confirmed to be metastatic RCC and the patient was started on sutent. In 11/2016 her RCC was in remission and sutent was discontinued. in 05/2019 she developed another breast mass and the bx was c/w RCC and at that time started on Nivo/Ipi. She developed B/L breast lesions in 08/2020 and Bx confirmed met RCC, pt continued on Nivo/Ipi, last given 09/10/20    Problem# Metastatic RCC Dx 2008  s/p  Left nephrectomy  s/p sutent and currently on immunotherapy with Nivo/Ipi  tx last given 9/10/20  Hold all Onc tx during admission  pt denies diarrhea/SOB  Pulse Ox 100% on RA  CT c/w known breast and pancreatic masses Bx proven RCC  upon discharge pt to f/u with Oncologist Dr. Pelaez    Problem# Anemia  Hgb stable  etiology JAX/Malignancy/B12 deficiency  anemia panel ordered  B12 low at 338  TSH=6.3  Ixpfwnps=856 with Trans sat 5%  MM w/u pending  given Venofer 200mg IV qd x 4d (can complete pending doses as an outpt when pt ready for discharge)  daily CBC  given B12 1,000mcg IM x 1      Problem# Sepsis  p/w fever 102F , ,,lactate 1.3  WBC & ANC WNL  afebrile today  UA +  Likely 2/2 UTI vs fever malignancy  abx changed to ceftriaxone  UCx + e-coli and BCx (-)  ID following    Problem# VTE Prophylaxis  continue Heparin SC     Thank you, will continue to monitor the patient.  Please call with any questions [phone]  Above reviewed with Attending Dr. Benito

## 2020-09-24 NOTE — PROGRESS NOTE ADULT - SUBJECTIVE AND OBJECTIVE BOX
NP Note discussed with  Primary Attending    59 y/o female from home, with PMH of HTN, HLD, DM, metastatic renal CA to breast and pancreas diagnosed 2008, last chemo treatment two weeks ago (9/10/20). PSH of left kidney removal 2008, bladder reconstruction 2008, hysterectomy Patient admitted for sepsis secondary to UTI. ID following. Patient chemo therapy on hold. hem onc following. Patient found to have uncontrolled diabetes, endocrine consulted, diabetes medications adjusted.        INTERVAL HPI/OVERNIGHT EVENTS:  ID# [id number] looks better today, no new complaints.    MEDICATIONS  (STANDING):  atorvastatin 40 milliGRAM(s) Oral at bedtime  budesonide  80 MICROgram(s)/formoterol 4.5 MICROgram(s) Inhaler 2 Puff(s) Inhalation two times a day  cefTRIAXone   IVPB 2000 milliGRAM(s) IV Intermittent every 24 hours  cyanocobalamin Injectable 1000 MICROGram(s) IntraMuscular once  ergocalciferol 34405 Unit(s) Oral <User Schedule>  heparin   Injectable 5000 Unit(s) SubCutaneous every 8 hours  insulin glargine Injectable (LANTUS) 50 Unit(s) SubCutaneous at bedtime  insulin lispro (HumaLOG) corrective regimen sliding scale   SubCutaneous three times a day before meals  insulin lispro Injectable (HumaLOG) 14 Unit(s) SubCutaneous three times a day before meals  levothyroxine 88 MICROGram(s) Oral <User Schedule>  pantoprazole    Tablet 40 milliGRAM(s) Oral before breakfast  sodium chloride 0.9%. 1000 milliLiter(s) (75 mL/Hr) IV Continuous <Continuous>  Triglide (fenofibrate) 160 milliGRAM(s) 160 milliGRAM(s) Oral daily    MEDICATIONS  (PRN):  acetaminophen   Tablet .. 650 milliGRAM(s) Oral every 6 hours PRN Temp greater or equal to 38C (100.4F)  ALBUTerol    90 MICROgram(s) HFA Inhaler 2 Puff(s) Inhalation every 6 hours PRN Bronchospasm      __________________________________________________  REVIEW OF SYSTEMS:    CONSTITUTIONAL: No fever,   EYES: no acute visual disturbances  NECK: No pain or stiffness  RESPIRATORY: No cough; No shortness of breath  CARDIOVASCULAR: No chest pain, no palpitations  GASTROINTESTINAL: No pain. No nausea or vomiting; No diarrhea   NEUROLOGICAL: No headache or numbness, no tremors  MUSCULOSKELETAL: No joint pain, no muscle pain  GENITOURINARY: no dysuria, no frequency, no hesitancy  PSYCHIATRY: no depression , no anxiety  ALL OTHER  ROS negative        Vital Signs Last 24 Hrs  T(C): 36.9 (24 Sep 2020 05:02), Max: 38.1 (23 Sep 2020 13:37)  T(F): 98.4 (24 Sep 2020 05:02), Max: 100.5 (23 Sep 2020 13:37)  HR: 89 (24 Sep 2020 05:02) (89 - 102)  BP: 123/60 (24 Sep 2020 05:02) (123/60 - 148/59)  BP(mean): --  RR: 17 (24 Sep 2020 05:02) (17 - 18)  SpO2: 98% (24 Sep 2020 05:02) (98% - 100%)    ________________________________________________  PHYSICAL EXAM:  GENERAL: NAD  HEENT: Normocephalic;  conjunctivae and sclerae clear; moist mucous membranes;   NECK : supple  CHEST/LUNG: Clear to auscultation bilaterally with good air entry   HEART: S1 S2  regular; no murmurs, gallops or rubs  ABDOMEN: Soft, Nontender, Nondistended; Bowel sounds present  EXTREMITIES: no cyanosis; no edema; no calf tenderness  SKIN: warm and dry; no rash  NERVOUS SYSTEM:  Awake and alert; Oriented  to place, person and time ; no new deficits    _________________________________________________  LABS:                        9.7    3.16  )-----------( 166      ( 24 Sep 2020 07:02 )             29.5     09-24    SEE NOTE  |  SEE NOTE  |  SEE NOTE  ----------------------------<  SEE NOTE  SEE NOTE   |  SEE NOTE  |  SEE NOTE    Ca    SEE NOTE      24 Sep 2020 07:02          CAPILLARY BLOOD GLUCOSE      POCT Blood Glucose.: 299 mg/dL (24 Sep 2020 08:13)  POCT Blood Glucose.: 364 mg/dL (23 Sep 2020 21:45)  POCT Blood Glucose.: 243 mg/dL (23 Sep 2020 16:54)  POCT Blood Glucose.: 225 mg/dL (23 Sep 2020 11:54)        RADIOLOGY & ADDITIONAL TESTS:  < from: Xray Chest 1 View-PORTABLE IMMEDIATE (09.21.20 @ 13:41) >  EXAM:  XR CHEST PORTABLE IMMED 1V                            PROCEDURE DATE:  09/21/2020          INTERPRETATION:  Portable chest x-ray    Indication: Sepsis    Portable chest x-ray is acquired without a previous examination for comparison.    Impression: No evidence for pulmonary consolidation, pleural effusion or pneumothorax.    Small left basilar atelectasis.    The trachea is midline.    The cardiac silhouette is magnified by AP technique.            NIRALI WOODS M.D., ATTENDING RADIOLOGIST  This document has been electronically signed. Sep 21 2020  1:51PM    < end of copied text >  < from: CT Abdomen and Pelvis No Cont (09.21.20 @ 21:21) >    EXAM:  CT ABDOMEN AND PELVIS                          EXAM:  CT CHEST                            PROCEDURE DATE:  09/21/2020          INTERPRETATION:  CLINICAL INFORMATION: Cough, fever, sepsis. Acute kidney injury. Metastatic renal cancer with metastasis to the breast and pancreas, on chemotherapy. Status post left nephrectomy and bladder reconstruction.    COMPARISON: None.    PROCEDURE:  CT of the Chest, Abdomen and Pelvis was performed without intravenous contrast.  Intravenous contrast: None.  Oral contrast: None.  Sagittal and coronal reformats were performed.    FINDINGS:  CHEST:  LUNGS AND LARGE AIRWAYS: Patent central airways. 5 mm right middle lobe pulmonary nodule (2:87) evaluation of the lung parenchyma is limited by significantrespiratory motion.  PLEURA: No pleural effusion.  VESSELS: Within normal limits.  HEART: Heart size is normal. No pericardial effusion. Aortic valvular calcification.  MEDIASTINUM AND MADDY: Couple nonspecific small volume mediastinal nodes.  CHEST WALL AND LOWER NECK: 2.3 x 1.8 cm posterior right breast lesion containing what appears to be a postsurgical clip. Left supraclavicular lymphadenopathy. For example a 1.3 x 1.3 cm node (4:14)    ABDOMEN AND PELVIS:    Evaluation is limited by motion.  LIVER: Within normal limits.  BILE DUCTS: Normal caliber.  GALLBLADDER: Appears contracted.  SPLEEN: Within normal limits.  PANCREAS: 1.6 cm area of hypoattenuation within the pancreatic body (4:106) as well as ill-defined enlargement of the pancreatic head. No pancreatic ductal dilatation.  ADRENALS: Within normal limits.  KIDNEYS/URETERS: Status post left nephrectomy. No nuvia right-sided hydronephrosis.    BLADDER: Within normal limits.  REPRODUCTIVE ORGANS: Hysterectomy.    BOWEL: No bowel obstruction.  PERITONEUM: No ascites.  VESSELS: Within normal limits.  RETROPERITONEUM/LYMPH NODES: Retroperitoneal lymphadenopathy. For example a 1.5 x 1.1 cm left para-aortic node (4:114). Additional numerous small volume mesenteric nodes. Left pelvic sidewall node (4:174) measures 1.4 x 0.7 cm.  ABDOMINAL WALL: Postsurgical changes. Lower anterior abdominal wall nonobstructive bowel containing widemouth hernia.  BONES: No aggressive osseous lesion..    IMPRESSION:  5 mm right middle lobe pulmonarynodule. No parenchymal consolidation or pleural effusion.    Left supraclavicular lymphadenopathy.    2.2 x 1.8 cm ovoid right posterior breast lesion, with associated surgical clip. Correlate with mammographic and postsurgical results.    Hypodensity within the pancreatic body as well as ill-defined and enlargement of the pancreatic head, concerning for underlying lesions and may correspond to patient's known pancreatic metastatic disease. Correlation with prior outside imaging, if available is recommended to evaluate for interval change. Evaluation is limited without the administration of intravenous contrast material.    Retroperitoneal lymphadenopathy.    Left nephrectomy. No nuvia right renal hydronephrosis. Evaluation is limited by motion.    2.3 x 1.8 cm posterior ovoid right breast lesion, with associated surgical clip. Correlate with mammographic findings and/or postsurgical results.          CHIP MOTTA M.D., RADIOLOGY RESIDENT  This document has been electronically signed.  CHIP MOTTA M.D., RADIOLOGY RESIDENT  This document has been electronically signed. Sep 22 2020 12:02AM    < end of copied text >      Imaging  Reviewed:  YES    Consultant(s) Notes Reviewed:   YES      Plan of care was discussed with patient and /or primary care giver; all questions and concerns were addressed NP Note discussed with  Primary Attending    59 y/o female from home, with PMH of HTN, HLD, DM, metastatic renal CA to breast and pancreas diagnosed 2008, last chemo treatment two weeks ago (9/10/20). PSH of left kidney removal 2008, bladder reconstruction 2008, hysterectomy Patient admitted for sepsis secondary to UTI. ID following. Patient chemo therapy on hold. hem onc following. Patient found to have uncontrolled diabetes, endocrine consulted, diabetes medications adjusted.        INTERVAL HPI/OVERNIGHT EVENTS:  ID# [id number] feels better today, no new complaints.    MEDICATIONS  (STANDING):  atorvastatin 40 milliGRAM(s) Oral at bedtime  budesonide  80 MICROgram(s)/formoterol 4.5 MICROgram(s) Inhaler 2 Puff(s) Inhalation two times a day  cefTRIAXone   IVPB 2000 milliGRAM(s) IV Intermittent every 24 hours  cyanocobalamin Injectable 1000 MICROGram(s) IntraMuscular once  ergocalciferol 05515 Unit(s) Oral <User Schedule>  heparin   Injectable 5000 Unit(s) SubCutaneous every 8 hours  insulin glargine Injectable (LANTUS) 50 Unit(s) SubCutaneous at bedtime  insulin lispro (HumaLOG) corrective regimen sliding scale   SubCutaneous three times a day before meals  insulin lispro Injectable (HumaLOG) 14 Unit(s) SubCutaneous three times a day before meals  levothyroxine 88 MICROGram(s) Oral <User Schedule>  pantoprazole    Tablet 40 milliGRAM(s) Oral before breakfast  sodium chloride 0.9%. 1000 milliLiter(s) (75 mL/Hr) IV Continuous <Continuous>  Triglide (fenofibrate) 160 milliGRAM(s) 160 milliGRAM(s) Oral daily    MEDICATIONS  (PRN):  acetaminophen   Tablet .. 650 milliGRAM(s) Oral every 6 hours PRN Temp greater or equal to 38C (100.4F)  ALBUTerol    90 MICROgram(s) HFA Inhaler 2 Puff(s) Inhalation every 6 hours PRN Bronchospasm      __________________________________________________  REVIEW OF SYSTEMS:    CONSTITUTIONAL: No fever,   EYES: no acute visual disturbances  NECK: No pain or stiffness  RESPIRATORY: No cough; No shortness of breath  CARDIOVASCULAR: No chest pain, no palpitations  GASTROINTESTINAL: No pain. No nausea or vomiting; No diarrhea   NEUROLOGICAL: No headache or numbness, no tremors  MUSCULOSKELETAL: No joint pain, no muscle pain  GENITOURINARY: no dysuria, no frequency, no hesitancy  PSYCHIATRY: no depression , no anxiety  ALL OTHER  ROS negative        Vital Signs Last 24 Hrs  T(C): 36.9 (24 Sep 2020 05:02), Max: 38.1 (23 Sep 2020 13:37)  T(F): 98.4 (24 Sep 2020 05:02), Max: 100.5 (23 Sep 2020 13:37)  HR: 89 (24 Sep 2020 05:02) (89 - 102)  BP: 123/60 (24 Sep 2020 05:02) (123/60 - 148/59)  BP(mean): --  RR: 17 (24 Sep 2020 05:02) (17 - 18)  SpO2: 98% (24 Sep 2020 05:02) (98% - 100%)    ________________________________________________  PHYSICAL EXAM:  GENERAL: NAD  HEENT: Normocephalic;  conjunctivae and sclerae clear; moist mucous membranes;   NECK : supple  CHEST/LUNG: Clear to auscultation bilaterally with good air entry   HEART: S1 S2  regular; no murmurs, gallops or rubs  ABDOMEN: Soft, Nontender, Nondistended; Bowel sounds present  EXTREMITIES: no cyanosis; no edema; no calf tenderness  SKIN: warm and dry; no rash  NERVOUS SYSTEM:  Awake and alert; Oriented  to place, person and time ; no new deficits    _________________________________________________  LABS:                        9.7    3.16  )-----------( 166      ( 24 Sep 2020 07:02 )             29.5     09-24    SEE NOTE  |  SEE NOTE  |  SEE NOTE  ----------------------------<  SEE NOTE  SEE NOTE   |  SEE NOTE  |  SEE NOTE    Ca    SEE NOTE      24 Sep 2020 07:02          CAPILLARY BLOOD GLUCOSE      POCT Blood Glucose.: 299 mg/dL (24 Sep 2020 08:13)  POCT Blood Glucose.: 364 mg/dL (23 Sep 2020 21:45)  POCT Blood Glucose.: 243 mg/dL (23 Sep 2020 16:54)  POCT Blood Glucose.: 225 mg/dL (23 Sep 2020 11:54)        RADIOLOGY & ADDITIONAL TESTS:  < from: Xray Chest 1 View-PORTABLE IMMEDIATE (09.21.20 @ 13:41) >  EXAM:  XR CHEST PORTABLE IMMED 1V                            PROCEDURE DATE:  09/21/2020          INTERPRETATION:  Portable chest x-ray    Indication: Sepsis    Portable chest x-ray is acquired without a previous examination for comparison.    Impression: No evidence for pulmonary consolidation, pleural effusion or pneumothorax.    Small left basilar atelectasis.    The trachea is midline.    The cardiac silhouette is magnified by AP technique.            NIRALI WOODS M.D., ATTENDING RADIOLOGIST  This document has been electronically signed. Sep 21 2020  1:51PM    < end of copied text >  < from: CT Abdomen and Pelvis No Cont (09.21.20 @ 21:21) >    EXAM:  CT ABDOMEN AND PELVIS                          EXAM:  CT CHEST                            PROCEDURE DATE:  09/21/2020          INTERPRETATION:  CLINICAL INFORMATION: Cough, fever, sepsis. Acute kidney injury. Metastatic renal cancer with metastasis to the breast and pancreas, on chemotherapy. Status post left nephrectomy and bladder reconstruction.    COMPARISON: None.    PROCEDURE:  CT of the Chest, Abdomen and Pelvis was performed without intravenous contrast.  Intravenous contrast: None.  Oral contrast: None.  Sagittal and coronal reformats were performed.    FINDINGS:  CHEST:  LUNGS AND LARGE AIRWAYS: Patent central airways. 5 mm right middle lobe pulmonary nodule (2:87) evaluation of the lung parenchyma is limited by significantrespiratory motion.  PLEURA: No pleural effusion.  VESSELS: Within normal limits.  HEART: Heart size is normal. No pericardial effusion. Aortic valvular calcification.  MEDIASTINUM AND MADDY: Couple nonspecific small volume mediastinal nodes.  CHEST WALL AND LOWER NECK: 2.3 x 1.8 cm posterior right breast lesion containing what appears to be a postsurgical clip. Left supraclavicular lymphadenopathy. For example a 1.3 x 1.3 cm node (4:14)    ABDOMEN AND PELVIS:    Evaluation is limited by motion.  LIVER: Within normal limits.  BILE DUCTS: Normal caliber.  GALLBLADDER: Appears contracted.  SPLEEN: Within normal limits.  PANCREAS: 1.6 cm area of hypoattenuation within the pancreatic body (4:106) as well as ill-defined enlargement of the pancreatic head. No pancreatic ductal dilatation.  ADRENALS: Within normal limits.  KIDNEYS/URETERS: Status post left nephrectomy. No nuvia right-sided hydronephrosis.    BLADDER: Within normal limits.  REPRODUCTIVE ORGANS: Hysterectomy.    BOWEL: No bowel obstruction.  PERITONEUM: No ascites.  VESSELS: Within normal limits.  RETROPERITONEUM/LYMPH NODES: Retroperitoneal lymphadenopathy. For example a 1.5 x 1.1 cm left para-aortic node (4:114). Additional numerous small volume mesenteric nodes. Left pelvic sidewall node (4:174) measures 1.4 x 0.7 cm.  ABDOMINAL WALL: Postsurgical changes. Lower anterior abdominal wall nonobstructive bowel containing widemouth hernia.  BONES: No aggressive osseous lesion..    IMPRESSION:  5 mm right middle lobe pulmonarynodule. No parenchymal consolidation or pleural effusion.    Left supraclavicular lymphadenopathy.    2.2 x 1.8 cm ovoid right posterior breast lesion, with associated surgical clip. Correlate with mammographic and postsurgical results.    Hypodensity within the pancreatic body as well as ill-defined and enlargement of the pancreatic head, concerning for underlying lesions and may correspond to patient's known pancreatic metastatic disease. Correlation with prior outside imaging, if available is recommended to evaluate for interval change. Evaluation is limited without the administration of intravenous contrast material.    Retroperitoneal lymphadenopathy.    Left nephrectomy. No nuvia right renal hydronephrosis. Evaluation is limited by motion.    2.3 x 1.8 cm posterior ovoid right breast lesion, with associated surgical clip. Correlate with mammographic findings and/or postsurgical results.          CHIP MOTTA M.D., RADIOLOGY RESIDENT  This document has been electronically signed.  CHIP MOTTA M.D., RADIOLOGY RESIDENT  This document has been electronically signed. Sep 22 2020 12:02AM    < end of copied text >      Imaging  Reviewed:  YES    Consultant(s) Notes Reviewed:   YES      Plan of care was discussed with patient and /or primary care giver; all questions and concerns were addressed

## 2020-09-24 NOTE — PROGRESS NOTE ADULT - SUBJECTIVE AND OBJECTIVE BOX
Interval Events: Patient's blood sugar continue to be elevated, FS was 299 despite receiving Lantus 50 units at night.       Allergies    sulfa drugs (Hives)    Intolerances      Endocrine/Metabolic Medications:  atorvastatin 40 milliGRAM(s) Oral at bedtime  insulin glargine Injectable (LANTUS) 60 Unit(s) SubCutaneous at bedtime  insulin lispro (HumaLOG) corrective regimen sliding scale   SubCutaneous three times a day before meals  insulin lispro Injectable (HumaLOG) 18 Unit(s) SubCutaneous three times a day before meals  levothyroxine 88 MICROGram(s) Oral <User Schedule>      Vital Signs Last 24 Hrs  T(C): 36.9 (24 Sep 2020 05:02), Max: 38.1 (23 Sep 2020 13:37)  T(F): 98.4 (24 Sep 2020 05:02), Max: 100.5 (23 Sep 2020 13:37)  HR: 89 (24 Sep 2020 05:02) (89 - 102)  BP: 123/60 (24 Sep 2020 05:02) (123/60 - 148/59)  BP(mean): --  RR: 17 (24 Sep 2020 05:02) (17 - 18)  SpO2: 98% (24 Sep 2020 05:02) (98% - 100%)      PHYSICAL EXAM  All physical exam findings normal, except those marked:  General:	Alert, active, cooperative, NAD, well hydrated  .		[] Abnormal:  Neck		Normal: supple, no cervical adenopathy, no palpable thyroid  .		[] Abnormal:  Cardiovascular	Normal: regular rate, normal S1, S2, no murmurs  .		[] Abnormal:  Respiratory	Normal: no chest wall deformity, normal respiratory pattern, CTA B/L  .		[] Abnormal:  Abdominal	Normal: soft, ND, NT, bowel sounds present, no masses, no organomegaly  .		[] Abnormal:  		Normal normal genitalia, testes descended, circumcised/uncircumcised  .		  .		[] Abnormal:  Extremities	Normal: FROM x4  .		[] Abnormal:  Skin		Normal: intact and not indurated, no rash, no acanthosis nigricans  .		[] Abnormal:  Neurologic	Normal: grossly intact  .		[] Abnormal:    LABS                        9.7    3.16  )-----------( 166      ( 24 Sep 2020 07:02 )             29.5                               134    |  101    |  15                  Calcium: 8.8   / iCa: x      (09-24 @ 09:26)    ----------------------------<  292       Magnesium: x                                3.9     |  25     |  1.30             Phosphorous: x          CAPILLARY BLOOD GLUCOSE      POCT Blood Glucose.: 299 mg/dL (24 Sep 2020 08:13)  POCT Blood Glucose.: 364 mg/dL (23 Sep 2020 21:45)  POCT Blood Glucose.: 243 mg/dL (23 Sep 2020 16:54)  POCT Blood Glucose.: 225 mg/dL (23 Sep 2020 11:54)               Interval Events: Patient's blood sugar continue to be elevated, FS was 299 despite receiving Lantus 50 units at night.       Allergies    sulfa drugs (Hives)    Intolerances      Endocrine/Metabolic Medications:  atorvastatin 40 milliGRAM(s) Oral at bedtime  insulin glargine Injectable (LANTUS) 60 Unit(s) SubCutaneous at bedtime  insulin lispro (HumaLOG) corrective regimen sliding scale   SubCutaneous three times a day before meals  insulin lispro Injectable (HumaLOG) 18 Unit(s) SubCutaneous three times a day before meals  levothyroxine 88 MICROGram(s) Oral <User Schedule>      Vital Signs Last 24 Hrs  T(C): 36.9 (24 Sep 2020 05:02), Max: 38.1 (23 Sep 2020 13:37)  T(F): 98.4 (24 Sep 2020 05:02), Max: 100.5 (23 Sep 2020 13:37)  HR: 89 (24 Sep 2020 05:02) (89 - 102)  BP: 123/60 (24 Sep 2020 05:02) (123/60 - 148/59)  BP(mean): --  RR: 17 (24 Sep 2020 05:02) (17 - 18)  SpO2: 98% (24 Sep 2020 05:02) (98% - 100%)      PHYSICAL EXAM  All physical exam findings normal, except those marked:  General:	Alert, active, cooperative, NAD, well hydrated  .		[] Abnormal:  Neck		Normal: supple, no cervical adenopathy, no palpable thyroid  .		[] Abnormal:  Cardiovascular	Normal: regular rate, normal S1, S2, no murmurs  .		[] Abnormal:  Respiratory	Normal: no chest wall deformity, normal respiratory pattern, CTA B/L  .		[] Abnormal:  Abdominal	Normal: soft, ND, NT, bowel sounds present, no masses, no organomegaly  .		[] Abnormal:  		Normal normal genitalia,   .		  .		[] Abnormal:  Extremities	Normal: FROM x4  .		[] Abnormal:  Skin		Normal: intact and not indurated, no rash, no acanthosis nigricans  .		[] Abnormal:  Neurologic	Normal: grossly intact  .		[] Abnormal:    LABS                        9.7    3.16  )-----------( 166      ( 24 Sep 2020 07:02 )             29.5                               134    |  101    |  15                  Calcium: 8.8   / iCa: x      (09-24 @ 09:26)    ----------------------------<  292       Magnesium: x                                3.9     |  25     |  1.30             Phosphorous: x          CAPILLARY BLOOD GLUCOSE      POCT Blood Glucose.: 299 mg/dL (24 Sep 2020 08:13)  POCT Blood Glucose.: 364 mg/dL (23 Sep 2020 21:45)  POCT Blood Glucose.: 243 mg/dL (23 Sep 2020 16:54)  POCT Blood Glucose.: 225 mg/dL (23 Sep 2020 11:54)

## 2020-09-25 LAB
ANION GAP SERPL CALC-SCNC: 11 MMOL/L — SIGNIFICANT CHANGE UP (ref 5–17)
BUN SERPL-MCNC: 15 MG/DL — SIGNIFICANT CHANGE UP (ref 7–18)
CALCIUM SERPL-MCNC: 8.4 MG/DL — SIGNIFICANT CHANGE UP (ref 8.4–10.5)
CHLORIDE SERPL-SCNC: 101 MMOL/L — SIGNIFICANT CHANGE UP (ref 96–108)
CO2 SERPL-SCNC: 20 MMOL/L — LOW (ref 22–31)
CREAT SERPL-MCNC: 1.37 MG/DL — HIGH (ref 0.5–1.3)
GLUCOSE BLDC GLUCOMTR-MCNC: 275 MG/DL — HIGH (ref 70–99)
GLUCOSE BLDC GLUCOMTR-MCNC: 278 MG/DL — HIGH (ref 70–99)
GLUCOSE BLDC GLUCOMTR-MCNC: 281 MG/DL — HIGH (ref 70–99)
GLUCOSE BLDC GLUCOMTR-MCNC: 319 MG/DL — HIGH (ref 70–99)
GLUCOSE BLDC GLUCOMTR-MCNC: 365 MG/DL — HIGH (ref 70–99)
GLUCOSE SERPL-MCNC: 307 MG/DL — HIGH (ref 70–99)
HCT VFR BLD CALC: 28.5 % — LOW (ref 34.5–45)
HGB BLD-MCNC: 9.3 G/DL — LOW (ref 11.5–15.5)
MCHC RBC-ENTMCNC: 26.6 PG — LOW (ref 27–34)
MCHC RBC-ENTMCNC: 32.6 GM/DL — SIGNIFICANT CHANGE UP (ref 32–36)
MCV RBC AUTO: 81.4 FL — SIGNIFICANT CHANGE UP (ref 80–100)
NRBC # BLD: 0 /100 WBCS — SIGNIFICANT CHANGE UP (ref 0–0)
PLATELET # BLD AUTO: 175 K/UL — SIGNIFICANT CHANGE UP (ref 150–400)
POTASSIUM SERPL-MCNC: 3.8 MMOL/L — SIGNIFICANT CHANGE UP (ref 3.5–5.3)
POTASSIUM SERPL-SCNC: 3.8 MMOL/L — SIGNIFICANT CHANGE UP (ref 3.5–5.3)
RBC # BLD: 3.5 M/UL — LOW (ref 3.8–5.2)
RBC # FLD: 13.3 % — SIGNIFICANT CHANGE UP (ref 10.3–14.5)
SODIUM SERPL-SCNC: 132 MMOL/L — LOW (ref 135–145)
WBC # BLD: 3.34 K/UL — LOW (ref 3.8–10.5)
WBC # FLD AUTO: 3.34 K/UL — LOW (ref 3.8–10.5)

## 2020-09-25 RX ORDER — HYDRALAZINE HCL 50 MG
10 TABLET ORAL THREE TIMES A DAY
Refills: 0 | Status: DISCONTINUED | OUTPATIENT
Start: 2020-09-25 | End: 2020-09-25

## 2020-09-25 RX ORDER — ISOSORBIDE MONONITRATE 60 MG/1
30 TABLET, EXTENDED RELEASE ORAL DAILY
Refills: 0 | Status: DISCONTINUED | OUTPATIENT
Start: 2020-09-25 | End: 2020-09-25

## 2020-09-25 RX ORDER — PANTOPRAZOLE SODIUM 20 MG/1
1 TABLET, DELAYED RELEASE ORAL
Qty: 0 | Refills: 0 | DISCHARGE
Start: 2020-09-25

## 2020-09-25 RX ORDER — FENOFIBRATE,MICRONIZED 130 MG
1 CAPSULE ORAL
Qty: 0 | Refills: 0 | DISCHARGE

## 2020-09-25 RX ORDER — INSULIN GLARGINE 100 [IU]/ML
60 INJECTION, SOLUTION SUBCUTANEOUS EVERY MORNING
Refills: 0 | Status: DISCONTINUED | OUTPATIENT
Start: 2020-09-25 | End: 2020-09-26

## 2020-09-25 RX ORDER — DIPHENHYDRAMINE HCL 50 MG
25 CAPSULE ORAL EVERY 6 HOURS
Refills: 0 | Status: DISCONTINUED | OUTPATIENT
Start: 2020-09-25 | End: 2020-09-26

## 2020-09-25 RX ORDER — DIPHENHYDRAMINE HCL 50 MG
1 CAPSULE ORAL
Qty: 0 | Refills: 0 | DISCHARGE
Start: 2020-09-25

## 2020-09-25 RX ADMIN — Medication 1 TABLET(S): at 17:10

## 2020-09-25 RX ADMIN — BUDESONIDE AND FORMOTEROL FUMARATE DIHYDRATE 2 PUFF(S): 160; 4.5 AEROSOL RESPIRATORY (INHALATION) at 22:17

## 2020-09-25 RX ADMIN — IRON SUCROSE 110 MILLIGRAM(S): 20 INJECTION, SOLUTION INTRAVENOUS at 10:02

## 2020-09-25 RX ADMIN — Medication 18 UNIT(S): at 17:09

## 2020-09-25 RX ADMIN — Medication 10 MILLIGRAM(S): at 13:09

## 2020-09-25 RX ADMIN — Medication 40 MILLIGRAM(S): at 10:01

## 2020-09-25 RX ADMIN — Medication 10: at 08:08

## 2020-09-25 RX ADMIN — HEPARIN SODIUM 5000 UNIT(S): 5000 INJECTION INTRAVENOUS; SUBCUTANEOUS at 05:10

## 2020-09-25 RX ADMIN — Medication 18 UNIT(S): at 08:08

## 2020-09-25 RX ADMIN — HEPARIN SODIUM 5000 UNIT(S): 5000 INJECTION INTRAVENOUS; SUBCUTANEOUS at 22:18

## 2020-09-25 RX ADMIN — INSULIN GLARGINE 60 UNIT(S): 100 INJECTION, SOLUTION SUBCUTANEOUS at 13:35

## 2020-09-25 RX ADMIN — Medication 18 UNIT(S): at 11:54

## 2020-09-25 RX ADMIN — Medication 6: at 17:09

## 2020-09-25 RX ADMIN — ATORVASTATIN CALCIUM 40 MILLIGRAM(S): 80 TABLET, FILM COATED ORAL at 22:16

## 2020-09-25 RX ADMIN — Medication 88 MICROGRAM(S): at 05:09

## 2020-09-25 RX ADMIN — BUDESONIDE AND FORMOTEROL FUMARATE DIHYDRATE 2 PUFF(S): 160; 4.5 AEROSOL RESPIRATORY (INHALATION) at 10:02

## 2020-09-25 RX ADMIN — Medication 8: at 11:54

## 2020-09-25 RX ADMIN — PANTOPRAZOLE SODIUM 40 MILLIGRAM(S): 20 TABLET, DELAYED RELEASE ORAL at 07:38

## 2020-09-25 RX ADMIN — HEPARIN SODIUM 5000 UNIT(S): 5000 INJECTION INTRAVENOUS; SUBCUTANEOUS at 13:09

## 2020-09-25 NOTE — PROGRESS NOTE ADULT - PROBLEM SELECTOR PLAN 5
-HgA1c 7.5  -Blood sugar >250  -cont Humolog increased to 18 units TID  -cont Lantus 60 units at night  -Moderate ISS  -continue accu checks ACHS  -Endocrine Dr. Johnson

## 2020-09-25 NOTE — PROGRESS NOTE ADULT - SUBJECTIVE AND OBJECTIVE BOX
Pt is awake, alert, oob to chair in NAD. Receiving Venofer.     INTERVAL HPI/OVERNIGHT EVENTS:      VITAL SIGNS:  T(F): 98.4 (09-25-20 @ 05:10)  HR: 93 (09-25-20 @ 05:10)  BP: 131/57 (09-25-20 @ 05:10)  RR: 17 (09-25-20 @ 05:10)  SpO2: 97% (09-25-20 @ 05:10)  Wt(kg): --  I&O's Detail    24 Sep 2020 07:01  -  25 Sep 2020 07:00  --------------------------------------------------------  IN:    Oral Fluid: 200 mL  Total IN: 200 mL    OUT:  Total OUT: 0 mL    Total NET: 200 mL              REVIEW OF SYSTEMS:    CONSTITUTIONAL:  No fevers, chills, sweats    HEENT:  Eyes:  No diplopia or blurred vision. ENT:  No earache, sore throat or runny nose.    CARDIOVASCULAR:  No pressure, squeezing, tightness, or heaviness about the chest; no palpitations.    RESPIRATORY:  Per HPI    GASTROINTESTINAL:  No abdominal pain, nausea, vomiting or diarrhea.    GENITOURINARY:  No dysuria, frequency or urgency.    NEUROLOGIC:  No paresthesias, fasciculations, seizures or weakness.    PSYCHIATRIC:  No disorder of thought or mood.      PHYSICAL EXAM:    Constitutional: Well developed and nourished  Eyes:Perrla  ENMT: normal  Neck:supple  Respiratory: good air entry  Cardiovascular: S1 S2 regular  Gastrointestinal: Soft, Non tender  Extremities: No edema  Vascular:normal  Neurological:Awake, alert,Ox3  Musculoskeletal:Normal      MEDICATIONS  (STANDING):  amoxicillin  875 milliGRAM(s)/clavulanate 1 Tablet(s) Oral two times a day  atorvastatin 40 milliGRAM(s) Oral at bedtime  budesonide  80 MICROgram(s)/formoterol 4.5 MICROgram(s) Inhaler 2 Puff(s) Inhalation two times a day  ergocalciferol 61164 Unit(s) Oral <User Schedule>  heparin   Injectable 5000 Unit(s) SubCutaneous every 8 hours  hydrALAZINE 10 milliGRAM(s) Oral three times a day  insulin glargine Injectable (LANTUS) 60 Unit(s) SubCutaneous at bedtime  insulin lispro (HumaLOG) corrective regimen sliding scale   SubCutaneous three times a day before meals  insulin lispro Injectable (HumaLOG) 18 Unit(s) SubCutaneous three times a day before meals  iron sucrose IVPB 200 milliGRAM(s) IV Intermittent every 24 hours  isosorbide   mononitrate ER Tablet (IMDUR) 30 milliGRAM(s) Oral daily  levothyroxine 88 MICROGram(s) Oral <User Schedule>  pantoprazole    Tablet 40 milliGRAM(s) Oral before breakfast  sodium chloride 0.9%. 1000 milliLiter(s) (75 mL/Hr) IV Continuous <Continuous>  Triglide (fenofibrate) 160 milliGRAM(s) 160 milliGRAM(s) Oral daily    MEDICATIONS  (PRN):  acetaminophen   Tablet .. 650 milliGRAM(s) Oral every 6 hours PRN Temp greater or equal to 38C (100.4F)  ALBUTerol    90 MICROgram(s) HFA Inhaler 2 Puff(s) Inhalation every 6 hours PRN Bronchospasm  diphenhydrAMINE 25 milliGRAM(s) Oral every 6 hours PRN Rash and/or Itching      Allergies    sulfa drugs (Hives)    Intolerances        LABS:                        9.3    3.34  )-----------( 175      ( 25 Sep 2020 07:12 )             28.5     09-25    132<L>  |  101  |  15  ----------------------------<  307<H>  3.8   |  20<L>  |  1.37<H>    Ca    8.4      25 Sep 2020 07:12    TPro  x   /  Alb  3.7  /  TBili  x   /  DBili  x   /  AST  x   /  ALT  x   /  AlkPhos  x   09-23              CAPILLARY BLOOD GLUCOSE      POCT Blood Glucose.: 365 mg/dL (25 Sep 2020 08:06)  POCT Blood Glucose.: 142 mg/dL (24 Sep 2020 21:05)  POCT Blood Glucose.: 149 mg/dL (24 Sep 2020 17:08)  POCT Blood Glucose.: 270 mg/dL (24 Sep 2020 11:35)        RADIOLOGY & ADDITIONAL TESTS:    CXR:    Ct scan chest:    ekg;    echo:

## 2020-09-25 NOTE — PROGRESS NOTE ADULT - SUBJECTIVE AND OBJECTIVE BOX
Patient is a 60y old  Female who presents with a chief complaint of sepsis (24 Sep 2020 16:16)    pt seen in ed [  ], reg med floor [ x  ], bed [ x ], chair at bedside [   ], a+o x3 [ x ], lethargic [  ],    nad [ x ]      Allergies    sulfa drugs (Hives)        Vitals    T(F): 98.4 (09-25-20 @ 05:10), Max: 100 (09-24-20 @ 21:43)  HR: 93 (09-25-20 @ 05:10) (90 - 94)  BP: 131/57 (09-25-20 @ 05:10) (103/66 - 136/59)  RR: 17 (09-25-20 @ 05:10) (16 - 18)  SpO2: 97% (09-25-20 @ 05:10) (96% - 97%)  Wt(kg): --  CAPILLARY BLOOD GLUCOSE      POCT Blood Glucose.: 142 mg/dL (24 Sep 2020 21:05)      Labs                          9.7    3.16  )-----------( 166      ( 24 Sep 2020 07:02 )             29.5       09-24    134<L>  |  101  |  15  ----------------------------<  292<H>  3.9   |  25  |  1.30    Ca    8.8      24 Sep 2020 09:26    TPro  x   /  Alb  3.7  /  TBili  x   /  DBili  x   /  AST  x   /  ALT  x   /  AlkPhos  x   09-23            .Blood Blood-Peripheral  09-21 @ 22:06   No growth to date.  --  --      .Urine Clean Catch (Midstream)  09-21 @ 18:57   >100,000 CFU/ml Escherichia coli  --  Escherichia coli          Radiology Results      Meds    MEDICATIONS  (STANDING):  atorvastatin 40 milliGRAM(s) Oral at bedtime  budesonide  80 MICROgram(s)/formoterol 4.5 MICROgram(s) Inhaler 2 Puff(s) Inhalation two times a day  cefTRIAXone   IVPB 2000 milliGRAM(s) IV Intermittent every 24 hours  ergocalciferol 75684 Unit(s) Oral <User Schedule>  heparin   Injectable 5000 Unit(s) SubCutaneous every 8 hours  insulin glargine Injectable (LANTUS) 60 Unit(s) SubCutaneous at bedtime  insulin lispro (HumaLOG) corrective regimen sliding scale   SubCutaneous three times a day before meals  insulin lispro Injectable (HumaLOG) 18 Unit(s) SubCutaneous three times a day before meals  iron sucrose IVPB 200 milliGRAM(s) IV Intermittent every 24 hours  levothyroxine 88 MICROGram(s) Oral <User Schedule>  pantoprazole    Tablet 40 milliGRAM(s) Oral before breakfast  sodium chloride 0.9%. 1000 milliLiter(s) (75 mL/Hr) IV Continuous <Continuous>  Triglide (fenofibrate) 160 milliGRAM(s) 160 milliGRAM(s) Oral daily      MEDICATIONS  (PRN):  acetaminophen   Tablet .. 650 milliGRAM(s) Oral every 6 hours PRN Temp greater or equal to 38C (100.4F)  ALBUTerol    90 MICROgram(s) HFA Inhaler 2 Puff(s) Inhalation every 6 hours PRN Bronchospasm      Physical Exam    Neuro :  no focal deficits  Respiratory: CTA B/L  CV: RRR, S1S2, no murmurs,   Abdominal: Soft, NT, ND +BS,  Extremities: No edema, + peripheral pulses    ASSESSMENT    Urinary tract infection   sepsis,   acute bronchitis,   pulm nodule  s/p paola,   h/o metastatic renal CA (since 2008, mets to breast and pancreas, on chemo x about a year every 2 weeks),    DM (diabetes mellitus)  HTN (hypertension)  PSH of Left kidney removal in 2008 and bladder reconstruction in 2008,  S/P hysterectomy        PLAN      blood cx neg noted above   ucx and sens with e coli noted above  id f/u   change abx to Ceftriaxone since cx is sensitive  cont  bronchodilators,   pulm f/u   symbicort 80/4.5 mcgs 2 puffs po BID  pfts as OP.   ct chest abd pelv noted    heme onc f/u   s/p sutent and currently on immunotherapy with Nivo/Ipi  tx last given 9/10/20  Hold all Onc tx during admission  pt denies diarrhea/SOB  Pulse Ox 100% on RA  CT c/w known breast and pancreatic masses Bx proven RCC   Venofer 200mg IV qd x 4d (can complete pending doses as an outpt when pt ready for discharge)  upon discharge pt to f/u with Oncologist Dr. Pelaez  supplement vit b12   h/h stable  hgba1c 7.5 noted above  endo f/u   Lantus increased to 50 units and Humalog 14 units TID   serum creat wnl  cont current med         Patient is a 60y old  Female who presents with a chief complaint of sepsis (24 Sep 2020 16:16)    pt seen in ed [  ], reg med floor [ x  ], bed [ x ], chair at bedside [   ], a+o x3 [ x ], lethargic [  ],    nad [ x ]    pt developed erythematous papular rash on forearms and legs yesterday       Allergies    sulfa drugs (Hives)        Vitals    T(F): 98.4 (09-25-20 @ 05:10), Max: 100 (09-24-20 @ 21:43)  HR: 93 (09-25-20 @ 05:10) (90 - 94)  BP: 131/57 (09-25-20 @ 05:10) (103/66 - 136/59)  RR: 17 (09-25-20 @ 05:10) (16 - 18)  SpO2: 97% (09-25-20 @ 05:10) (96% - 97%)  Wt(kg): --  CAPILLARY BLOOD GLUCOSE      POCT Blood Glucose.: 142 mg/dL (24 Sep 2020 21:05)      Labs                          9.7    3.16  )-----------( 166      ( 24 Sep 2020 07:02 )             29.5       09-24    134<L>  |  101  |  15  ----------------------------<  292<H>  3.9   |  25  |  1.30    Ca    8.8      24 Sep 2020 09:26    TPro  x   /  Alb  3.7  /  TBili  x   /  DBili  x   /  AST  x   /  ALT  x   /  AlkPhos  x   09-23            .Blood Blood-Peripheral  09-21 @ 22:06   No growth to date.  --  --      .Urine Clean Catch (Midstream)  09-21 @ 18:57   >100,000 CFU/ml Escherichia coli  --  Escherichia coli          Radiology Results      Meds    MEDICATIONS  (STANDING):  atorvastatin 40 milliGRAM(s) Oral at bedtime  budesonide  80 MICROgram(s)/formoterol 4.5 MICROgram(s) Inhaler 2 Puff(s) Inhalation two times a day  cefTRIAXone   IVPB 2000 milliGRAM(s) IV Intermittent every 24 hours  ergocalciferol 93002 Unit(s) Oral <User Schedule>  heparin   Injectable 5000 Unit(s) SubCutaneous every 8 hours  insulin glargine Injectable (LANTUS) 60 Unit(s) SubCutaneous at bedtime  insulin lispro (HumaLOG) corrective regimen sliding scale   SubCutaneous three times a day before meals  insulin lispro Injectable (HumaLOG) 18 Unit(s) SubCutaneous three times a day before meals  iron sucrose IVPB 200 milliGRAM(s) IV Intermittent every 24 hours  levothyroxine 88 MICROGram(s) Oral <User Schedule>  pantoprazole    Tablet 40 milliGRAM(s) Oral before breakfast  sodium chloride 0.9%. 1000 milliLiter(s) (75 mL/Hr) IV Continuous <Continuous>  Triglide (fenofibrate) 160 milliGRAM(s) 160 milliGRAM(s) Oral daily      MEDICATIONS  (PRN):  acetaminophen   Tablet .. 650 milliGRAM(s) Oral every 6 hours PRN Temp greater or equal to 38C (100.4F)  ALBUTerol    90 MICROgram(s) HFA Inhaler 2 Puff(s) Inhalation every 6 hours PRN Bronchospasm      Physical Exam    Neuro :  no focal deficits  Respiratory: CTA B/L  CV: RRR, S1S2, no murmurs,   Abdominal: Soft, NT, ND +BS,  Extremities: No edema, + peripheral pulses    ASSESSMENT    Urinary tract infection   sepsis,   acute bronchitis,   pulm nodule  s/p paola,   h/o metastatic renal CA (since 2008, mets to breast and pancreas, on chemo x about a year every 2 weeks),    DM (diabetes mellitus)  HTN (hypertension)  PSH of Left kidney removal in 2008 and bladder reconstruction in 2008,  S/P hysterectomy        PLAN      blood cx neg noted above   ucx and sens with e coli noted above  id f/u   change abx to oral Augmentin 875mg q 12hours to continue until 9/29/20  cont  bronchodilators,   pulm f/u   symbicort 80/4.5 mcgs 2 puffs po BID  pfts as OP.   ct chest abd pelv noted    heme onc f/u   s/p sutent and currently on immunotherapy with Nivo/Ipi  tx last given 9/10/20  Hold all Onc tx during admission  pt denies diarrhea/SOB  Pulse Ox 100% on RA  CT c/w known breast and pancreatic masses Bx proven RCC   Venofer 200mg IV qd x 4d (can complete pending doses as an outpt when pt ready for discharge)  upon discharge pt to f/u with Oncologist Dr. Pelaez  supplement vit b12   h/h stable  hgba1c 7.5 noted above  endo f/u   cont Lantus to 60 units and Humalog 18 units TID   hss  serum creat wnl   for rash solumedrol x1 and benadryl 25 mg q 6 prn  cont to monitor rash while pt starts on augmentin  cont current med   d/c plan for am if stable

## 2020-09-25 NOTE — PROGRESS NOTE ADULT - PROBLEM SELECTOR PLAN 2
-Patient Renal CA with mets to breast and pancreas, last chemo dose on 9.10.20.  -Hold on chemotherapy during admission  -Patient will follow with Dr. Pelaez on discharge  -Hem Onc QMA following

## 2020-09-25 NOTE — PROGRESS NOTE ADULT - SUBJECTIVE AND OBJECTIVE BOX
Patient is seen and examined at the bed side, is afebrile.  She continues to feel better. The rash of upper extremities         REVIEW OF SYSTEMS: All other review systems are negative        ALLERGIES: sulfa drugs (Hives)        Vital Signs Last 24 Hrs  T(C): 36.3 (25 Sep 2020 14:30), Max: 37.8 (24 Sep 2020 21:43)  T(F): 97.4 (25 Sep 2020 14:30), Max: 100 (24 Sep 2020 21:43)  HR: 81 (25 Sep 2020 14:30) (81 - 94)  BP: 113/57 (25 Sep 2020 14:30) (109/59 - 136/59)  BP(mean): --  RR: 18 (25 Sep 2020 14:30) (17 - 18)  SpO2: 97% (25 Sep 2020 14:30) (97% - 97%)        PHYSICAL EXAM:  GENERAL: Not in distress   CHEST/LUNG: Not using accessory muscles  HEART: s1 and s2 present  ABDOMEN:  Nontender and  Nondistended  EXTREMITIES: No pedal  edema  CNS: Awake and Alert        LABS:                        9.3    3.34  )-----------( 175      ( 25 Sep 2020 07:12 )             28.5                           9.7    3.16  )-----------( 166      ( 24 Sep 2020 07:02 )             29.5             132<L>  |  101  |  15  ----------------------------<  307<H>  3.8   |  20<L>  |  1.37<H>    Ca    8.4      25 Sep 2020 07:12    TPro  x   /  Alb  3.7  /  TBili  x   /  DBili  x   /  AST  x   /  ALT  x   /  AlkPhos  x         0924    134<L>  |  101  |  15  ----------------------------<  292<H>  3.9   |  25  |  1.30    Ca    8.8      24 Sep 2020 09:26      09-21    134<L>  |  104  |  14  ----------------------------<  138<H>  4.0   |  26  |  1.37<H>    Ca    9.7      21 Sep 2020 14:00    TPro  8.1  /  Alb  3.4<L>  /  TBili  0.4  /  DBili  x   /  AST  39  /  ALT  30  /  AlkPhos  70    PT/INR - ( 21 Sep 2020 14:00 )   PT: 13.4 sec;   INR: 1.15 ratio    PTT - ( 21 Sep 2020 14:00 )  PTT:30.6 sec      CAPILLARY BLOOD GLUCOSE  POCT Blood Glucose.: 234 mg/dL (21 Sep 2020 17:32)      Urinalysis Basic - ( 21 Sep 2020 14:00 )  Color: Yellow / Appearance: Clear / S.010 / pH: x  Gluc: x / Ketone: Negative  / Bili: Negative / Urobili: Negative   Blood: x / Protein: Negative / Nitrite: Positive   Leuk Esterase: Moderate / RBC: 2-5 /HPF / WBC >50 /HPF   Sq Epi: x / Non Sq Epi: Moderate /HPF / Bacteria: Many /HPF        MEDICATIONS  (STANDING):    amoxicillin  875 milliGRAM(s)/clavulanate 1 Tablet(s) Oral two times a day  atorvastatin 40 milliGRAM(s) Oral at bedtime  budesonide  80 MICROgram(s)/formoterol 4.5 MICROgram(s) Inhaler 2 Puff(s) Inhalation two times a day  ergocalciferol 83603 Unit(s) Oral <User Schedule>  heparin   Injectable 5000 Unit(s) SubCutaneous every 8 hours  insulin glargine Injectable (LANTUS) 60 Unit(s) SubCutaneous every morning  insulin lispro (HumaLOG) corrective regimen sliding scale   SubCutaneous three times a day before meals  insulin lispro Injectable (HumaLOG) 18 Unit(s) SubCutaneous three times a day before meals  iron sucrose IVPB 200 milliGRAM(s) IV Intermittent every 24 hours  levothyroxine 88 MICROGram(s) Oral <User Schedule>  pantoprazole    Tablet 40 milliGRAM(s) Oral before breakfast  sodium chloride 0.9%. 1000 milliLiter(s) (75 mL/Hr) IV Continuous <Continuous>  Triglide (fenofibrate) 160 milliGRAM(s) 160 milliGRAM(s) Oral daily          RADIOLOGY & ADDITIONAL TESTS:    20 : CT Abdomen and Pelvis No Cont (20 @ 21:21) 5 mm right middle lobe pulmonary nodule. No parenchymal consolidation or pleural effusion.    Left supraclavicular lymphadenopathy.    2.2 x 1.8 cm ovoid right posterior breast lesion, with associated surgical clip. Correlate with mammographic and postsurgical results.    Hypodensity within the pancreatic body as well as ill-defined and enlargement of the pancreatic head, concerning for underlying lesions and may correspond to patient's known pancreatic metastatic disease. Correlation with prior outside imaging, if available is recommended to evaluate for interval change. Evaluation is limited without the administration of intravenous contrast material.    Retroperitoneal lymphadenopathy.    Left nephrectomy. No nuvia right renal hydronephrosis. Evaluation is limited by motion.    2.3 x 1.8 cm posterior ovoid right breast lesion, with associated surgical clip. Correlate with mammographic findings and/or postsurgical results.          20: CT Chest No Cont (20 @ 21:21) LUNGS AND LARGE AIRWAYS: Patent central airways. 5 mm right middle lobe pulmonary nodule (2:87) evaluation of the lung parenchyma is limited by significantrespiratory motion.  PLEURA: No pleural effusion.    VESSELS: Within normal limits.      20 : Xray Chest 1 View-PORTABLE IMMEDIATE (20 @ 13:41) : No evidence for pulmonary consolidation, pleural effusion or pneumothorax.    Small left basilar atelectasis.  The trachea is midline.   The cardiac silhouette is magnified by AP technique.        MICROBIOLOGY DATA:      Culture - Blood (20 @ 22:06)   Specimen Source: .Blood Blood-Peripheral   Culture Results:   No growth to date.     Culture - Blood (20 @ 22:06)   Specimen Source: .Blood Blood-Peripheral   Culture Results:   No growth to date.     Culture - Urine (20 @ 18:57)   - Amikacin: S <=16   - Amoxicillin/Clavulanic Acid: S <=8/4   - Ampicillin: S <=8 These ampicillin results predict results for amoxicillin   - Ampicillin/Sulbactam: S <=4/2 Enterobacter, Citrobacter, and Serratia may develop resistance during prolonged therapy (3-4 days)   - Aztreonam: S <=4   - Cefazolin: S <=2 ([id number]_[email or web address]) For uncomplicated UTI with K. pneumoniae, E. coli, or P. mirablis: STONEY <=16 is sensitive and STONEY >=32 is resistant. This also predicts results for oral agents cefaclor, cefdinir, cefpodoxime, cefprozil, cefuroxime axetil, cephalexin and locarbef for uncomplicated UTI. Note that some isolates may be susceptible to these agents while testing resistant to cefazolin.   - Cefepime: S <=2   - Cefoxitin: S <=8   - Ceftriaxone: S <=1 Enterobacter, Citrobacter, and Serratia may develop resistance during prolonged therapy   - Ciprofloxacin: I 0.5   - Ertapenem: S <=0.5   - Gentamicin: S <=2   - Imipenem: S <=1   - Levofloxacin: I 1   - Meropenem: S <=1   - Nitrofurantoin: S <=32 Should not be used to treat pyelonephritis   - Piperacillin/Tazobactam: S <=8   - Tigecycline: S <=2   - Tobramycin: S <=2   - Trimethoprim/Sulfamethoxazole: R >   Specimen Source: .Urine Clean Catch (Midstream)   Culture Results:   >100,000 CFU/ml Escherichia coli   Organism Identification: Escherichia coli   Organism: Escherichia coli   Method Type: STONEY   Respiratory Viral/Bacti Detection by RUTHY (20 @ 20:56)   Rapid RVP Result: NotDetec   COVID-19 PCR . (20 @ 14:00)   COVID-19 PCR: NotDetec: Testing is performed using polymerase chain reaction (PCR) or   transcription mediated amplification (TMA).    Culture - Urine (20 @ 18:57)   Specimen Source: .Urine Clean Catch (Midstream)   Culture Results:   >100,000 CFU/ml Escherichia coli                Patient is seen and examined at the bed side, is afebrile.  She continues to feel better. The rash of upper extremities resolved completely.         REVIEW OF SYSTEMS: All other review systems are negative        ALLERGIES: sulfa drugs (Hives)        Vital Signs Last 24 Hrs  T(C): 36.3 (25 Sep 2020 14:30), Max: 37.8 (24 Sep 2020 21:43)  T(F): 97.4 (25 Sep 2020 14:30), Max: 100 (24 Sep 2020 21:43)  HR: 81 (25 Sep 2020 14:30) (81 - 94)  BP: 113/57 (25 Sep 2020 14:30) (109/59 - 136/59)  BP(mean): --  RR: 18 (25 Sep 2020 14:30) (17 - 18)  SpO2: 97% (25 Sep 2020 14:30) (97% - 97%)        PHYSICAL EXAM:  GENERAL: Not in distress   CHEST/LUNG: Not using accessory muscles  HEART: s1 and s2 present  ABDOMEN:  Nontender and  Nondistended  EXTREMITIES: No pedal  edema  CNS: Awake and Alert        LABS:                        9.3    3.34  )-----------( 175      ( 25 Sep 2020 07:12 )             28.5                           9.7    3.16  )-----------( 166      ( 24 Sep 2020 07:02 )             29.5             132<L>  |  101  |  15  ----------------------------<  307<H>  3.8   |  20<L>  |  1.37<H>    Ca    8.4      25 Sep 2020 07:12    TPro  x   /  Alb  3.7  /  TBili  x   /  DBili  x   /  AST  x   /  ALT  x   /  AlkPhos  x         09-24    134<L>  |  101  |  15  ----------------------------<  292<H>  3.9   |  25  |  1.30    Ca    8.8      24 Sep 2020 09:26      09-21    134<L>  |  104  |  14  ----------------------------<  138<H>  4.0   |  26  |  1.37<H>    Ca    9.7      21 Sep 2020 14:00    TPro  8.1  /  Alb  3.4<L>  /  TBili  0.4  /  DBili  x   /  AST  39  /  ALT  30  /  AlkPhos  70    PT/INR - ( 21 Sep 2020 14:00 )   PT: 13.4 sec;   INR: 1.15 ratio    PTT - ( 21 Sep 2020 14:00 )  PTT:30.6 sec      CAPILLARY BLOOD GLUCOSE  POCT Blood Glucose.: 234 mg/dL (21 Sep 2020 17:32)      Urinalysis Basic - ( 21 Sep 2020 14:00 )  Color: Yellow / Appearance: Clear / S.010 / pH: x  Gluc: x / Ketone: Negative  / Bili: Negative / Urobili: Negative   Blood: x / Protein: Negative / Nitrite: Positive   Leuk Esterase: Moderate / RBC: 2-5 /HPF / WBC >50 /HPF   Sq Epi: x / Non Sq Epi: Moderate /HPF / Bacteria: Many /HPF        MEDICATIONS  (STANDING):    amoxicillin  875 milliGRAM(s)/clavulanate 1 Tablet(s) Oral two times a day  atorvastatin 40 milliGRAM(s) Oral at bedtime  budesonide  80 MICROgram(s)/formoterol 4.5 MICROgram(s) Inhaler 2 Puff(s) Inhalation two times a day  ergocalciferol 05781 Unit(s) Oral <User Schedule>  heparin   Injectable 5000 Unit(s) SubCutaneous every 8 hours  insulin glargine Injectable (LANTUS) 60 Unit(s) SubCutaneous every morning  insulin lispro (HumaLOG) corrective regimen sliding scale   SubCutaneous three times a day before meals  insulin lispro Injectable (HumaLOG) 18 Unit(s) SubCutaneous three times a day before meals  iron sucrose IVPB 200 milliGRAM(s) IV Intermittent every 24 hours  levothyroxine 88 MICROGram(s) Oral <User Schedule>  pantoprazole    Tablet 40 milliGRAM(s) Oral before breakfast  sodium chloride 0.9%. 1000 milliLiter(s) (75 mL/Hr) IV Continuous <Continuous>  Triglide (fenofibrate) 160 milliGRAM(s) 160 milliGRAM(s) Oral daily          RADIOLOGY & ADDITIONAL TESTS:    20 : CT Abdomen and Pelvis No Cont (20 @ 21:21) 5 mm right middle lobe pulmonary nodule. No parenchymal consolidation or pleural effusion.    Left supraclavicular lymphadenopathy.    2.2 x 1.8 cm ovoid right posterior breast lesion, with associated surgical clip. Correlate with mammographic and postsurgical results.    Hypodensity within the pancreatic body as well as ill-defined and enlargement of the pancreatic head, concerning for underlying lesions and may correspond to patient's known pancreatic metastatic disease. Correlation with prior outside imaging, if available is recommended to evaluate for interval change. Evaluation is limited without the administration of intravenous contrast material.    Retroperitoneal lymphadenopathy.    Left nephrectomy. No nuvia right renal hydronephrosis. Evaluation is limited by motion.    2.3 x 1.8 cm posterior ovoid right breast lesion, with associated surgical clip. Correlate with mammographic findings and/or postsurgical results.          20: CT Chest No Cont (20 @ 21:21) LUNGS AND LARGE AIRWAYS: Patent central airways. 5 mm right middle lobe pulmonary nodule (2:87) evaluation of the lung parenchyma is limited by significantrespiratory motion.  PLEURA: No pleural effusion.    VESSELS: Within normal limits.      20 : Xray Chest 1 View-PORTABLE IMMEDIATE (20 @ 13:41) : No evidence for pulmonary consolidation, pleural effusion or pneumothorax.    Small left basilar atelectasis.  The trachea is midline.   The cardiac silhouette is magnified by AP technique.        MICROBIOLOGY DATA:      Culture - Blood (20 @ 22:06)   Specimen Source: .Blood Blood-Peripheral   Culture Results:   No growth to date.     Culture - Blood (20 @ 22:06)   Specimen Source: .Blood Blood-Peripheral   Culture Results:   No growth to date.     Culture - Urine (20 @ 18:57)   - Amikacin: S <=16   - Amoxicillin/Clavulanic Acid: S <=8/4   - Ampicillin: S <=8 These ampicillin results predict results for amoxicillin   - Ampicillin/Sulbactam: S <=4/2 Enterobacter, Citrobacter, and Serratia may develop resistance during prolonged therapy (3-4 days)   - Aztreonam: S <=4   - Cefazolin: S <=2 (MIC_CL_COM_ENTERIC_CEFAZU) For uncomplicated UTI with K. pneumoniae, E. coli, or P. mirablis: STONEY <=16 is sensitive and STONEY >=32 is resistant. This also predicts results for oral agents cefaclor, cefdinir, cefpodoxime, cefprozil, cefuroxime axetil, cephalexin and locarbef for uncomplicated UTI. Note that some isolates may be susceptible to these agents while testing resistant to cefazolin.   - Cefepime: S <=2   - Cefoxitin: S <=8   - Ceftriaxone: S <=1 Enterobacter, Citrobacter, and Serratia may develop resistance during prolonged therapy   - Ciprofloxacin: I 0.5   - Ertapenem: S <=0.5   - Gentamicin: S <=2   - Imipenem: S <=1   - Levofloxacin: I 1   - Meropenem: S <=1   - Nitrofurantoin: S <=32 Should not be used to treat pyelonephritis   - Piperacillin/Tazobactam: S <=8   - Tigecycline: S <=2   - Tobramycin: S <=2   - Trimethoprim/Sulfamethoxazole: R >   Specimen Source: .Urine Clean Catch (Midstream)   Culture Results:   >100,000 CFU/ml Escherichia coli   Organism Identification: Escherichia coli   Organism: Escherichia coli   Method Type: STONEY   Respiratory Viral/Bacti Detection by RUTHY (20 @ 20:56)   Rapid RVP Result: NotDetec   COVID-19 PCR . (20 @ 14:00)   COVID-19 PCR: NotDetec: Testing is performed using polymerase chain reaction (PCR) or   transcription mediated amplification (TMA).    Culture - Urine (20 @ 18:57)   Specimen Source: .Urine Clean Catch (Midstream)   Culture Results:   >100,000 CFU/ml Escherichia coli

## 2020-09-25 NOTE — PROGRESS NOTE ADULT - SUBJECTIVE AND OBJECTIVE BOX
Interval Events: patient's blood sugar was noted to be 365 this am, charts were reviewed patient was not given Lantus 60 units at night by RN as her BS was 147 at that time. Patient denies having any additional meal, has been eating same portions.       Allergies    sulfa drugs (Hives)    Intolerances      Endocrine/Metabolic Medications:  atorvastatin 40 milliGRAM(s) Oral at bedtime  insulin glargine Injectable (LANTUS) 60 Unit(s) SubCutaneous at bedtime  insulin lispro (HumaLOG) corrective regimen sliding scale   SubCutaneous three times a day before meals  insulin lispro Injectable (HumaLOG) 18 Unit(s) SubCutaneous three times a day before meals  levothyroxine 88 MICROGram(s) Oral <User Schedule>      Vital Signs Last 24 Hrs  T(C): 36.9 (25 Sep 2020 05:10), Max: 37.8 (24 Sep 2020 21:43)  T(F): 98.4 (25 Sep 2020 05:10), Max: 100 (24 Sep 2020 21:43)  HR: 93 (25 Sep 2020 05:10) (90 - 94)  BP: 131/57 (25 Sep 2020 05:10) (103/66 - 136/59)  BP(mean): --  RR: 17 (25 Sep 2020 05:10) (16 - 18)  SpO2: 97% (25 Sep 2020 05:10) (96% - 97%)      PHYSICAL EXAM  All physical exam findings normal, except those marked:  General:	Alert, active, cooperative, NAD, well hydrated  .		[] Abnormal:  Neck		Normal: supple, no cervical adenopathy, no palpable thyroid  .		[] Abnormal:  Cardiovascular	Normal: regular rate, normal S1, S2, no murmurs  .		[] Abnormal:  Respiratory	Normal: no chest wall deformity, normal respiratory pattern, CTA B/L  .		[] Abnormal:  Abdominal	Normal: soft, ND, NT, bowel sounds present, no masses, no organomegaly  .		[] Abnormal:  		Normal normal genitalia  .		  .		[] Abnormal:  Extremities	Normal: FROM x4  .		[] Abnormal:  Skin		Normal: intact and not indurated, no rash, no acanthosis nigricans  .		[] Abnormal:  Neurologic	Normal: grossly intact  .		[] Abnormal:    LABS                        9.3    3.34  )-----------( 175      ( 25 Sep 2020 07:12 )             28.5                               132    |  101    |  15                  Calcium: 8.4   / iCa: x      (09-25 @ 07:12)    ----------------------------<  307       Magnesium: x                                3.8     |  20     |  1.37             Phosphorous: x          CAPILLARY BLOOD GLUCOSE      POCT Blood Glucose.: 365 mg/dL (25 Sep 2020 08:06)  POCT Blood Glucose.: 142 mg/dL (24 Sep 2020 21:05)  POCT Blood Glucose.: 149 mg/dL (24 Sep 2020 17:08)  POCT Blood Glucose.: 270 mg/dL (24 Sep 2020 11:35)        Assesment/plan

## 2020-09-25 NOTE — PROGRESS NOTE ADULT - ASSESSMENT
59 y/o F from home, living alone with CDPAP program (mohamud Collins is a caregiver 5hrs/ day 5days/wk)  with a significant PMHx of HTN, DM and metastatic renal CA (since 2008, mets to breast and pancreas, on chemo x about a year every 2 weeks),  PSH of Left kidney removal in 2008 and bladder reconstruction in 2008, hysterectomy presents to the ED with nonproductive cough x 4 days associated with fever and chills. Admitted for Sepsis secondary to UTI.     Endocrinology service was consulted given uncontrolled blood sugars during hospital stay.     1. DM with uncontrolled blood sugar:   -Patient has hx of DM, takes Basaglar 65 units at night, Admelog 20 in morning, 28 afternoon and 22 in evening at home.   with occasional hypoglycemia  -HbA1C: 7.5%  -Blood sugar this morning was noted to be 299 this morning.   -Would increase Lantus to 60 units and Humalog 18 units TID   -C/w moderate HSS  d/w hs.    2. Hypothyroidism:    -Patient has hx of hypothyroidism, on levothyroxine   TSH mildly elevated to 6.34    Free T4 wnl   Would c/w levothyroxine home dose     Note is incomplete 59 y/o F from home, living alone with CDPAP program (mohamud Collins is a caregiver 5hrs/ day 5days/wk)  with a significant PMHx of HTN, DM and metastatic renal CA (since 2008, mets to breast and pancreas, on chemo x about a year every 2 weeks),  PSH of Left kidney removal in 2008 and bladder reconstruction in 2008, hysterectomy presents to the ED with nonproductive cough x 4 days associated with fever and chills. Admitted for Sepsis secondary to UTI.     Endocrinology service was consulted given uncontrolled blood sugars during hospital stay.     1. DM with uncontrolled blood sugar:   -Patient has hx of DM, takes Basaglar 65 units at night, Admelog 20 in morning, 28 afternoon and 22 in evening at home.   with occasional hypoglycemia  -HbA1C: 7.5%  -Blood sugar this morning was elevated to 365 as patient did not receive Lantus last night   -Would give Lantus 60 units now and  every morning .  -c/w Humalog 18 units and moderate HSS   -C/w moderate HSS  d/w NP     2. Hypothyroidism:    -Patient has hx of hypothyroidism, on levothyroxine   TSH mildly elevated to 6.34    Free T4 wnl   Would c/w levothyroxine home dose     Note is incomplete

## 2020-09-25 NOTE — PROGRESS NOTE ADULT - PROBLEM SELECTOR PLAN 6
-not in exacerbation  -Budesonide 40/4.5 mcgs 2 puffs po BID  -Albuterol PRN  -Pulmonology Dr. Fierro

## 2020-09-25 NOTE — PROGRESS NOTE ADULT - ASSESSMENT
61 y/o female from home, with PMH of HTN, HLD, DM, metastatic renal CA to breast and pancreas diagnosed 2008, last chemo treatment two weeks ago (9/10/20). PSH of left kidney removal 2008, bladder reconstruction 2008, hysterectomy Patient admitted for sepsis secondary to UTI. ID following. Patient chemo therapy on hold. hem onc following. Patient found to have uncontrolled diabetes, endocrine consulted, diabetes medications adjusted. pt with Rash to b/l UE, s/p 1 dose of Solumedrol and abx changed to Augmentin

## 2020-09-25 NOTE — PROGRESS NOTE ADULT - PROBLEM SELECTOR PLAN 3
-Multifactorial, low baseline, current cancer and chemo   -B12 low 338, S/P B12 1000mcg IM x1  -cont Venofer   -mon CBC

## 2020-09-25 NOTE — PROGRESS NOTE ADULT - SUBJECTIVE AND OBJECTIVE BOX
Patient is a 60y old  Female who presents with a chief complaint of sepsis (25 Sep 2020 11:19)    OVERNIGHT EVENTS: no acute events overnight     REVIEW OF SYSTEMS:  CONSTITUTIONAL: No fever, chills  NECK: No pain or stiffness  RESPIRATORY: No cough, SOB  CARDIOVASCULAR: No chest pain, palpitations  GASTROINTESTINAL: No abdominal pain. No nausea, vomiting, or diarrhea  GENITOURINARY: No dysuria  NEUROLOGICAL: No HA  MUSCULOSKELETAL: No joint pain or swelling; No muscle, back, or extremity pain      T(C): 36.9 (09-25-20 @ 05:10), Max: 37.8 (09-24-20 @ 21:43)  HR: 82 (09-25-20 @ 11:56) (82 - 94)  BP: 109/59 (09-25-20 @ 11:56) (103/66 - 136/59)  RR: 17 (09-25-20 @ 05:10) (16 - 18)  SpO2: 97% (09-25-20 @ 05:10) (96% - 97%)  Wt(kg): --Vital Signs Last 24 Hrs  T(C): 36.9 (25 Sep 2020 05:10), Max: 37.8 (24 Sep 2020 21:43)  T(F): 98.4 (25 Sep 2020 05:10), Max: 100 (24 Sep 2020 21:43)  HR: 82 (25 Sep 2020 11:56) (82 - 94)  BP: 109/59 (25 Sep 2020 11:56) (103/66 - 136/59)  BP(mean): --  RR: 17 (25 Sep 2020 05:10) (16 - 18)  SpO2: 97% (25 Sep 2020 05:10) (96% - 97%)    MEDICATIONS  (STANDING):  amoxicillin  875 milliGRAM(s)/clavulanate 1 Tablet(s) Oral two times a day  atorvastatin 40 milliGRAM(s) Oral at bedtime  budesonide  80 MICROgram(s)/formoterol 4.5 MICROgram(s) Inhaler 2 Puff(s) Inhalation two times a day  ergocalciferol 84666 Unit(s) Oral <User Schedule>  heparin   Injectable 5000 Unit(s) SubCutaneous every 8 hours  hydrALAZINE 10 milliGRAM(s) Oral three times a day  insulin glargine Injectable (LANTUS) 60 Unit(s) SubCutaneous at bedtime  insulin lispro (HumaLOG) corrective regimen sliding scale   SubCutaneous three times a day before meals  insulin lispro Injectable (HumaLOG) 18 Unit(s) SubCutaneous three times a day before meals  iron sucrose IVPB 200 milliGRAM(s) IV Intermittent every 24 hours  isosorbide   mononitrate ER Tablet (IMDUR) 30 milliGRAM(s) Oral daily  levothyroxine 88 MICROGram(s) Oral <User Schedule>  pantoprazole    Tablet 40 milliGRAM(s) Oral before breakfast  sodium chloride 0.9%. 1000 milliLiter(s) (75 mL/Hr) IV Continuous <Continuous>  Triglide (fenofibrate) 160 milliGRAM(s) 160 milliGRAM(s) Oral daily    MEDICATIONS  (PRN):  acetaminophen   Tablet .. 650 milliGRAM(s) Oral every 6 hours PRN Temp greater or equal to 38C (100.4F)  ALBUTerol    90 MICROgram(s) HFA Inhaler 2 Puff(s) Inhalation every 6 hours PRN Bronchospasm  diphenhydrAMINE 25 milliGRAM(s) Oral every 6 hours PRN Rash and/or Itching      PHYSICAL EXAM:  GENERAL: NAD  ENMT: Moist mucous membranes  NECK: Supple, No JVD  CHEST/LUNG: Clear to auscultation bilaterally; No rales, rhonchi, wheezing, or rubs  HEART: S1, S2, Regular rate and rhythm  ABDOMEN: Soft, Nontender, Nondistended; Bowel sounds present  NEURO: Alert & Oriented X3  EXTREMITIES: No LE edema, no calf tenderness  SKIN: No rashes or lesions    Consultant(s) Notes Reviewed:  [x ] YES  [ ] NO  Care Discussed with Consultants/Other Providers [ x] YES  [ ] NO    LABS:                        9.3    3.34  )-----------( 175      ( 25 Sep 2020 07:12 )             28.5     09-25    132<L>  |  101  |  15  ----------------------------<  307<H>  3.8   |  20<L>  |  1.37<H>    Ca    8.4      25 Sep 2020 07:12    TPro  x   /  Alb  3.7  /  TBili  x   /  DBili  x   /  AST  x   /  ALT  x   /  AlkPhos  x   09-23      CAPILLARY BLOOD GLUCOSE      POCT Blood Glucose.: 319 mg/dL (25 Sep 2020 11:40)  POCT Blood Glucose.: 365 mg/dL (25 Sep 2020 08:06)  POCT Blood Glucose.: 142 mg/dL (24 Sep 2020 21:05)  POCT Blood Glucose.: 149 mg/dL (24 Sep 2020 17:08)    RADIOLOGY & ADDITIONAL TESTS:        Imaging Personally Reviewed:  [ ] YES  [ ] NO

## 2020-09-25 NOTE — PROGRESS NOTE ADULT - ASSESSMENT
Patient is a 60y old  Female from home, [facility] (mohamud Collins is a caregiver 5hrs/ day 5days/wk)  with a significant PMHx of HTN, DM and metastatic renal CA (since 2008, mets to breast and pancreas, on chemo x about a year every 2 weeks),  PSH of Left kidney removal in 2008 and bladder reconstruction in 2008, hysterectomy, now presents to the ER for evaluation of nonproductive cough, fever and chills. Patient's last chemo was 2 weeks ago. Patient seen by PMD Dr. Valdivia today, noted for fever 103F and was sent in for further evaluation. On admission, he found to have fever, tachycardia, positive Urine analysis and negative CXR. She has started on Meropenem and Vancomycin, and the ID consult requested to assist with further evaluation and antibiotic management.     # Sepsis ( Fever + tachycardia )- resolving, still spiking fever   # UTI  - UCX E.coli  # Metastatic Renal cancer- s/p  Left nephrectomy on chemo therapy, last one was about 2 weeks ago  # COVID 19 is negative    would recommend:    1. Continue Augmentin 875mg q 12hours on discharge to continue until 9/29/20  2. OOB to chair   3. Pain management as needed    d/w patient and Nursing staff    Attending Attestation:    Spent more than 40 minutes on total encounter, more than 50 % of the visit was spent counseling and/or coordinating care by the Attending physician.

## 2020-09-25 NOTE — PROGRESS NOTE ADULT - PROBLEM SELECTOR PLAN 1
-admitted for sepsis secondary to UTI  -urine culture with e.coli  -blood cultures negative  -cont Augmentin til 9/29  -ID dr. Rojas

## 2020-09-26 ENCOUNTER — TRANSCRIPTION ENCOUNTER (OUTPATIENT)
Age: 60
End: 2020-09-26

## 2020-09-26 VITALS
RESPIRATION RATE: 16 BRPM | DIASTOLIC BLOOD PRESSURE: 64 MMHG | OXYGEN SATURATION: 100 % | HEART RATE: 77 BPM | SYSTOLIC BLOOD PRESSURE: 107 MMHG | TEMPERATURE: 98 F

## 2020-09-26 LAB
ALBUMIN SERPL ELPH-MCNC: 2.5 G/DL — LOW (ref 3.5–5)
ALP SERPL-CCNC: 94 U/L — SIGNIFICANT CHANGE UP (ref 40–120)
ALT FLD-CCNC: 54 U/L DA — SIGNIFICANT CHANGE UP (ref 10–60)
ANION GAP SERPL CALC-SCNC: 6 MMOL/L — SIGNIFICANT CHANGE UP (ref 5–17)
AST SERPL-CCNC: 37 U/L — SIGNIFICANT CHANGE UP (ref 10–40)
BILIRUB SERPL-MCNC: 0.4 MG/DL — SIGNIFICANT CHANGE UP (ref 0.2–1.2)
BUN SERPL-MCNC: 21 MG/DL — HIGH (ref 7–18)
CALCIUM SERPL-MCNC: 8.9 MG/DL — SIGNIFICANT CHANGE UP (ref 8.4–10.5)
CHLORIDE SERPL-SCNC: 104 MMOL/L — SIGNIFICANT CHANGE UP (ref 96–108)
CO2 SERPL-SCNC: 26 MMOL/L — SIGNIFICANT CHANGE UP (ref 22–31)
CREAT SERPL-MCNC: 1.25 MG/DL — SIGNIFICANT CHANGE UP (ref 0.5–1.3)
CULTURE RESULTS: SIGNIFICANT CHANGE UP
CULTURE RESULTS: SIGNIFICANT CHANGE UP
GLUCOSE BLDC GLUCOMTR-MCNC: 220 MG/DL — HIGH (ref 70–99)
GLUCOSE BLDC GLUCOMTR-MCNC: 277 MG/DL — HIGH (ref 70–99)
GLUCOSE SERPL-MCNC: 244 MG/DL — HIGH (ref 70–99)
HCT VFR BLD CALC: 28.5 % — LOW (ref 34.5–45)
HGB BLD-MCNC: 9.7 G/DL — LOW (ref 11.5–15.5)
MCHC RBC-ENTMCNC: 27.5 PG — SIGNIFICANT CHANGE UP (ref 27–34)
MCHC RBC-ENTMCNC: 34 GM/DL — SIGNIFICANT CHANGE UP (ref 32–36)
MCV RBC AUTO: 80.7 FL — SIGNIFICANT CHANGE UP (ref 80–100)
NRBC # BLD: 0 /100 WBCS — SIGNIFICANT CHANGE UP (ref 0–0)
PLATELET # BLD AUTO: 242 K/UL — SIGNIFICANT CHANGE UP (ref 150–400)
POTASSIUM SERPL-MCNC: 4 MMOL/L — SIGNIFICANT CHANGE UP (ref 3.5–5.3)
POTASSIUM SERPL-SCNC: 4 MMOL/L — SIGNIFICANT CHANGE UP (ref 3.5–5.3)
PROT SERPL-MCNC: 7.4 G/DL — SIGNIFICANT CHANGE UP (ref 6–8.3)
RBC # BLD: 3.53 M/UL — LOW (ref 3.8–5.2)
RBC # FLD: 13.3 % — SIGNIFICANT CHANGE UP (ref 10.3–14.5)
SODIUM SERPL-SCNC: 136 MMOL/L — SIGNIFICANT CHANGE UP (ref 135–145)
SPECIMEN SOURCE: SIGNIFICANT CHANGE UP
SPECIMEN SOURCE: SIGNIFICANT CHANGE UP
WBC # BLD: 4.87 K/UL — SIGNIFICANT CHANGE UP (ref 3.8–10.5)
WBC # FLD AUTO: 4.87 K/UL — SIGNIFICANT CHANGE UP (ref 3.8–10.5)

## 2020-09-26 PROCEDURE — 85730 THROMBOPLASTIN TIME PARTIAL: CPT

## 2020-09-26 PROCEDURE — 87186 SC STD MICRODIL/AGAR DIL: CPT

## 2020-09-26 PROCEDURE — 82784 ASSAY IGA/IGD/IGG/IGM EACH: CPT

## 2020-09-26 PROCEDURE — 85610 PROTHROMBIN TIME: CPT

## 2020-09-26 PROCEDURE — 83036 HEMOGLOBIN GLYCOSYLATED A1C: CPT

## 2020-09-26 PROCEDURE — 74176 CT ABD & PELVIS W/O CONTRAST: CPT

## 2020-09-26 PROCEDURE — 96374 THER/PROPH/DIAG INJ IV PUSH: CPT

## 2020-09-26 PROCEDURE — 83605 ASSAY OF LACTIC ACID: CPT

## 2020-09-26 PROCEDURE — 99285 EMERGENCY DEPT VISIT HI MDM: CPT

## 2020-09-26 PROCEDURE — 85027 COMPLETE CBC AUTOMATED: CPT

## 2020-09-26 PROCEDURE — 71045 X-RAY EXAM CHEST 1 VIEW: CPT

## 2020-09-26 PROCEDURE — 86769 SARS-COV-2 COVID-19 ANTIBODY: CPT

## 2020-09-26 PROCEDURE — 84439 ASSAY OF FREE THYROXINE: CPT

## 2020-09-26 PROCEDURE — 87086 URINE CULTURE/COLONY COUNT: CPT

## 2020-09-26 PROCEDURE — 84300 ASSAY OF URINE SODIUM: CPT

## 2020-09-26 PROCEDURE — 84443 ASSAY THYROID STIM HORMONE: CPT

## 2020-09-26 PROCEDURE — 86803 HEPATITIS C AB TEST: CPT

## 2020-09-26 PROCEDURE — 84100 ASSAY OF PHOSPHORUS: CPT

## 2020-09-26 PROCEDURE — 94640 AIRWAY INHALATION TREATMENT: CPT

## 2020-09-26 PROCEDURE — 83550 IRON BINDING TEST: CPT

## 2020-09-26 PROCEDURE — 82746 ASSAY OF FOLIC ACID SERUM: CPT

## 2020-09-26 PROCEDURE — 81001 URINALYSIS AUTO W/SCOPE: CPT

## 2020-09-26 PROCEDURE — 71250 CT THORAX DX C-: CPT

## 2020-09-26 PROCEDURE — 93005 ELECTROCARDIOGRAM TRACING: CPT

## 2020-09-26 PROCEDURE — 82728 ASSAY OF FERRITIN: CPT

## 2020-09-26 PROCEDURE — 82306 VITAMIN D 25 HYDROXY: CPT

## 2020-09-26 PROCEDURE — 83540 ASSAY OF IRON: CPT

## 2020-09-26 PROCEDURE — 84156 ASSAY OF PROTEIN URINE: CPT

## 2020-09-26 PROCEDURE — 80048 BASIC METABOLIC PNL TOTAL CA: CPT

## 2020-09-26 PROCEDURE — 84165 PROTEIN E-PHORESIS SERUM: CPT

## 2020-09-26 PROCEDURE — 83935 ASSAY OF URINE OSMOLALITY: CPT

## 2020-09-26 PROCEDURE — 80061 LIPID PANEL: CPT

## 2020-09-26 PROCEDURE — 83735 ASSAY OF MAGNESIUM: CPT

## 2020-09-26 PROCEDURE — 0225U NFCT DS DNA&RNA 21 SARSCOV2: CPT

## 2020-09-26 PROCEDURE — 84436 ASSAY OF TOTAL THYROXINE: CPT

## 2020-09-26 PROCEDURE — 80053 COMPREHEN METABOLIC PANEL: CPT

## 2020-09-26 PROCEDURE — 82570 ASSAY OF URINE CREATININE: CPT

## 2020-09-26 PROCEDURE — 85045 AUTOMATED RETICULOCYTE COUNT: CPT

## 2020-09-26 PROCEDURE — 84145 PROCALCITONIN (PCT): CPT

## 2020-09-26 PROCEDURE — 83930 ASSAY OF BLOOD OSMOLALITY: CPT

## 2020-09-26 PROCEDURE — 96375 TX/PRO/DX INJ NEW DRUG ADDON: CPT

## 2020-09-26 PROCEDURE — 84155 ASSAY OF PROTEIN SERUM: CPT

## 2020-09-26 PROCEDURE — U0003: CPT

## 2020-09-26 PROCEDURE — 80202 ASSAY OF VANCOMYCIN: CPT

## 2020-09-26 PROCEDURE — 82607 VITAMIN B-12: CPT

## 2020-09-26 PROCEDURE — 82962 GLUCOSE BLOOD TEST: CPT

## 2020-09-26 PROCEDURE — 87040 BLOOD CULTURE FOR BACTERIA: CPT

## 2020-09-26 PROCEDURE — 84480 ASSAY TRIIODOTHYRONINE (T3): CPT

## 2020-09-26 PROCEDURE — 86334 IMMUNOFIX E-PHORESIS SERUM: CPT

## 2020-09-26 PROCEDURE — 36415 COLL VENOUS BLD VENIPUNCTURE: CPT

## 2020-09-26 PROCEDURE — 85025 COMPLETE CBC W/AUTO DIFF WBC: CPT

## 2020-09-26 RX ORDER — INSULIN LISPRO 100/ML
20 VIAL (ML) SUBCUTANEOUS
Qty: 1800 | Refills: 1
Start: 2020-09-26 | End: 2020-11-24

## 2020-09-26 RX ORDER — INSULIN GLARGINE 100 [IU]/ML
60 INJECTION, SOLUTION SUBCUTANEOUS
Qty: 3 | Refills: 1
Start: 2020-09-26 | End: 2020-11-24

## 2020-09-26 RX ORDER — INSULIN LISPRO 100/ML
20 VIAL (ML) SUBCUTANEOUS
Qty: 5 | Refills: 1
Start: 2020-09-26 | End: 2020-11-24

## 2020-09-26 RX ORDER — INSULIN LISPRO 100/ML
28 VIAL (ML) SUBCUTANEOUS
Qty: 0 | Refills: 0 | DISCHARGE

## 2020-09-26 RX ORDER — BUDESONIDE AND FORMOTEROL FUMARATE DIHYDRATE 160; 4.5 UG/1; UG/1
1 AEROSOL RESPIRATORY (INHALATION)
Qty: 0 | Refills: 0 | DISCHARGE
Start: 2020-09-26

## 2020-09-26 RX ADMIN — Medication 88 MICROGRAM(S): at 05:41

## 2020-09-26 RX ADMIN — Medication 18 UNIT(S): at 11:48

## 2020-09-26 RX ADMIN — BUDESONIDE AND FORMOTEROL FUMARATE DIHYDRATE 2 PUFF(S): 160; 4.5 AEROSOL RESPIRATORY (INHALATION) at 11:50

## 2020-09-26 RX ADMIN — Medication 18 UNIT(S): at 08:16

## 2020-09-26 RX ADMIN — Medication 4: at 11:48

## 2020-09-26 RX ADMIN — HEPARIN SODIUM 5000 UNIT(S): 5000 INJECTION INTRAVENOUS; SUBCUTANEOUS at 05:41

## 2020-09-26 RX ADMIN — INSULIN GLARGINE 60 UNIT(S): 100 INJECTION, SOLUTION SUBCUTANEOUS at 08:17

## 2020-09-26 RX ADMIN — PANTOPRAZOLE SODIUM 40 MILLIGRAM(S): 20 TABLET, DELAYED RELEASE ORAL at 05:42

## 2020-09-26 RX ADMIN — Medication 1 TABLET(S): at 05:41

## 2020-09-26 RX ADMIN — IRON SUCROSE 110 MILLIGRAM(S): 20 INJECTION, SOLUTION INTRAVENOUS at 11:50

## 2020-09-26 RX ADMIN — Medication 6: at 08:17

## 2020-09-26 NOTE — PROGRESS NOTE ADULT - PROBLEM SELECTOR PROBLEM 2
Metastatic renal cell carcinoma

## 2020-09-26 NOTE — PROGRESS NOTE ADULT - ASSESSMENT
Patient is a 60y old  Female from home, [facility] (mohamud Collins is a caregiver 5hrs/ day 5days/wk)  with a significant PMHx of HTN, DM and metastatic renal CA (since 2008, mets to breast and pancreas, on chemo x about a year every 2 weeks),  PSH of Left kidney removal in 2008 and bladder reconstruction in 2008, hysterectomy, now presents to the ER for evaluation of nonproductive cough, fever and chills. Patient's last chemo was 2 weeks ago. Patient seen by PMD Dr. Valdivia today, noted for fever 103F and was sent in for further evaluation. On admission, he found to have fever, tachycardia, positive Urine analysis and negative CXR. She has started on Meropenem and Vancomycin, and the ID consult requested to assist with further evaluation and antibiotic management.     # Sepsis ( Fever + tachycardia )- resolving, still spiking fever   # UTI  - UCX E.coli  # Metastatic Renal cancer- s/p  Left nephrectomy on chemo therapy, last one was about 2 weeks ago  # COVID 19 is negative    would recommend:    1. Continue Augmentin 875mg q 12hours on discharge to continue until 9/29/20  2. OOB to chair   3. Pain management as needed    d/w patient and Nursing staff    Attending Attestation:    Spent more than 40 minutes on total encounter, more than 50 % of the visit was spent counseling and/or coordinating care by the Attending physician. Patient is a 60y old  Female from home, [facility] (mohamud Collins is a caregiver 5hrs/ day 5days/wk)  with a significant PMHx of HTN, DM and metastatic renal CA (since 2008, mets to breast and pancreas, on chemo x about a year every 2 weeks),  PSH of Left kidney removal in 2008 and bladder reconstruction in 2008, hysterectomy, now presents to the ER for evaluation of nonproductive cough, fever and chills. Patient's last chemo was 2 weeks ago. Patient seen by PMD Dr. Valdivia today, noted for fever 103F and was sent in for further evaluation. On admission, he found to have fever, tachycardia, positive Urine analysis and negative CXR. She has started on Meropenem and Vancomycin, and the ID consult requested to assist with further evaluation and antibiotic management.     # Sepsis ( Fever + tachycardia )- resolving, still spiking fever   # UTI  - UCX E.coli  # Metastatic Renal cancer- s/p  Left nephrectomy on chemo therapy, last one was about 2 weeks ago  # COVID 19 is negative    would recommend:    1. OOB to chair   2. Continue Augmentin 875mg q 12hours on discharge to continue until 9/29/20 X 3 more days  3. Pain management as needed    d/w Nursing staff    Attending Attestation:    Spent more than 35 minutes on total encounter, more than 50 % of the visit was spent counseling and/or coordinating care by the Attending physician.

## 2020-09-26 NOTE — PROGRESS NOTE ADULT - SUBJECTIVE AND OBJECTIVE BOX
Patient is a 60y old  Female who presents with a chief complaint of sepsis (25 Sep 2020 20:19)    pt seen in ed [  ], reg med floor [ x  ], bed [ x ], chair at bedside [   ], a+o x3 [ x ], lethargic [  ],    nad [ x ]    pt developed erythematous papular rash on forearms and legs yesterday       Allergies    sulfa drugs (Hives)        Vitals    T(F): 97.3 (09-26-20 @ 05:14), Max: 98.5 (09-25-20 @ 21:10)  HR: 75 (09-26-20 @ 05:14) (73 - 82)  BP: 135/70 (09-26-20 @ 05:14) (109/59 - 135/70)  RR: 18 (09-26-20 @ 05:14) (17 - 18)  SpO2: 100% (09-26-20 @ 05:14) (97% - 100%)  Wt(kg): --  CAPILLARY BLOOD GLUCOSE      POCT Blood Glucose.: 275 mg/dL (25 Sep 2020 21:07)      Labs                          9.3    3.34  )-----------( 175      ( 25 Sep 2020 07:12 )             28.5       09-25    132<L>  |  101  |  15  ----------------------------<  307<H>  3.8   |  20<L>  |  1.37<H>    Ca    8.4      25 Sep 2020 07:12              .Blood Blood-Peripheral  09-21 @ 22:06   No growth to date.  --  --      .Urine Clean Catch (Midstream)  09-21 @ 18:57   >100,000 CFU/ml Escherichia coli  --  Escherichia coli          Radiology Results      Meds    MEDICATIONS  (STANDING):  amoxicillin  875 milliGRAM(s)/clavulanate 1 Tablet(s) Oral two times a day  atorvastatin 40 milliGRAM(s) Oral at bedtime  budesonide  80 MICROgram(s)/formoterol 4.5 MICROgram(s) Inhaler 2 Puff(s) Inhalation two times a day  ergocalciferol 42577 Unit(s) Oral <User Schedule>  heparin   Injectable 5000 Unit(s) SubCutaneous every 8 hours  insulin glargine Injectable (LANTUS) 60 Unit(s) SubCutaneous every morning  insulin lispro (HumaLOG) corrective regimen sliding scale   SubCutaneous three times a day before meals  insulin lispro Injectable (HumaLOG) 18 Unit(s) SubCutaneous three times a day before meals  iron sucrose IVPB 200 milliGRAM(s) IV Intermittent every 24 hours  levothyroxine 88 MICROGram(s) Oral <User Schedule>  pantoprazole    Tablet 40 milliGRAM(s) Oral before breakfast  sodium chloride 0.9%. 1000 milliLiter(s) (75 mL/Hr) IV Continuous <Continuous>  Triglide (fenofibrate) 160 milliGRAM(s) 160 milliGRAM(s) Oral daily      MEDICATIONS  (PRN):  acetaminophen   Tablet .. 650 milliGRAM(s) Oral every 6 hours PRN Temp greater or equal to 38C (100.4F)  ALBUTerol    90 MICROgram(s) HFA Inhaler 2 Puff(s) Inhalation every 6 hours PRN Bronchospasm  diphenhydrAMINE 25 milliGRAM(s) Oral every 6 hours PRN Rash and/or Itching      Physical Exam    Neuro :  no focal deficits  Respiratory: CTA B/L  CV: RRR, S1S2, no murmurs,   Abdominal: Soft, NT, ND +BS,  Extremities: No edema, + peripheral pulses    ASSESSMENT    Urinary tract infection   sepsis,   acute bronchitis,   pulm nodule  s/p paola,   h/o metastatic renal CA (since 2008, mets to breast and pancreas, on chemo x about a year every 2 weeks),    DM (diabetes mellitus)  HTN (hypertension)  PSH of Left kidney removal in 2008 and bladder reconstruction in 2008,  S/P hysterectomy        PLAN      blood cx neg noted above   ucx and sens with e coli noted above  id f/u   change abx to oral Augmentin 875mg q 12hours to continue until 9/29/20  cont  bronchodilators,   pulm f/u   symbicort 80/4.5 mcgs 2 puffs po BID  pfts as OP.   ct chest abd pelv noted    heme onc f/u   s/p sutent and currently on immunotherapy with Nivo/Ipi  tx last given 9/10/20  Hold all Onc tx during admission  pt denies diarrhea/SOB  Pulse Ox 100% on RA  CT c/w known breast and pancreatic masses Bx proven RCC   Venofer 200mg IV qd x 4d (can complete pending doses as an outpt when pt ready for discharge)  upon discharge pt to f/u with Oncologist Dr. Pelaez  supplement vit b12   h/h stable  hgba1c 7.5 noted above  endo f/u   cont Lantus to 60 units and Humalog 18 units TID   hss  serum creat wnl   for rash solumedrol x1 and benadryl 25 mg q 6 prn  cont to monitor rash while pt starts on augmentin  cont current med   d/c plan for am if stable         Patient is a 60y old  Female who presents with a chief complaint of sepsis (25 Sep 2020 20:19)    pt seen in ed [  ], reg med floor [ x  ], bed [ x ], chair at bedside [   ], a+o x3 [ x ], lethargic [  ],    nad [ x ]    rash resolved      Allergies    sulfa drugs (Hives)        Vitals    T(F): 97.3 (09-26-20 @ 05:14), Max: 98.5 (09-25-20 @ 21:10)  HR: 75 (09-26-20 @ 05:14) (73 - 82)  BP: 135/70 (09-26-20 @ 05:14) (109/59 - 135/70)  RR: 18 (09-26-20 @ 05:14) (17 - 18)  SpO2: 100% (09-26-20 @ 05:14) (97% - 100%)  Wt(kg): --  CAPILLARY BLOOD GLUCOSE      POCT Blood Glucose.: 275 mg/dL (25 Sep 2020 21:07)      Labs                          9.3    3.34  )-----------( 175      ( 25 Sep 2020 07:12 )             28.5       09-25    132<L>  |  101  |  15  ----------------------------<  307<H>  3.8   |  20<L>  |  1.37<H>    Ca    8.4      25 Sep 2020 07:12              .Blood Blood-Peripheral  09-21 @ 22:06   No growth to date.  --  --      .Urine Clean Catch (Midstream)  09-21 @ 18:57   >100,000 CFU/ml Escherichia coli  --  Escherichia coli          Radiology Results      Meds    MEDICATIONS  (STANDING):  amoxicillin  875 milliGRAM(s)/clavulanate 1 Tablet(s) Oral two times a day  atorvastatin 40 milliGRAM(s) Oral at bedtime  budesonide  80 MICROgram(s)/formoterol 4.5 MICROgram(s) Inhaler 2 Puff(s) Inhalation two times a day  ergocalciferol 32888 Unit(s) Oral <User Schedule>  heparin   Injectable 5000 Unit(s) SubCutaneous every 8 hours  insulin glargine Injectable (LANTUS) 60 Unit(s) SubCutaneous every morning  insulin lispro (HumaLOG) corrective regimen sliding scale   SubCutaneous three times a day before meals  insulin lispro Injectable (HumaLOG) 18 Unit(s) SubCutaneous three times a day before meals  iron sucrose IVPB 200 milliGRAM(s) IV Intermittent every 24 hours  levothyroxine 88 MICROGram(s) Oral <User Schedule>  pantoprazole    Tablet 40 milliGRAM(s) Oral before breakfast  sodium chloride 0.9%. 1000 milliLiter(s) (75 mL/Hr) IV Continuous <Continuous>  Triglide (fenofibrate) 160 milliGRAM(s) 160 milliGRAM(s) Oral daily      MEDICATIONS  (PRN):  acetaminophen   Tablet .. 650 milliGRAM(s) Oral every 6 hours PRN Temp greater or equal to 38C (100.4F)  ALBUTerol    90 MICROgram(s) HFA Inhaler 2 Puff(s) Inhalation every 6 hours PRN Bronchospasm  diphenhydrAMINE 25 milliGRAM(s) Oral every 6 hours PRN Rash and/or Itching      Physical Exam    Neuro :  no focal deficits  Respiratory: CTA B/L  CV: RRR, S1S2, no murmurs,   Abdominal: Soft, NT, ND +BS,  Extremities: No edema, + peripheral pulses    ASSESSMENT    Urinary tract infection   sepsis,   acute bronchitis,   pulm nodule  s/p paola,   h/o metastatic renal CA (since 2008, mets to breast and pancreas, on chemo x about a year every 2 weeks),    DM (diabetes mellitus)  HTN (hypertension)  PSH of Left kidney removal in 2008 and bladder reconstruction in 2008,  S/P hysterectomy        PLAN      blood cx neg noted above   ucx and sens with e coli noted above  id f/u   cont Augmentin 875mg q 12hours to continue until 9/29/20  cont  bronchodilators,   pulm f/u   symbicort 80/4.5 mcgs 2 puffs po BID  pfts as OP.   ct chest abd pelv noted    heme onc f/u   s/p sutent and currently on immunotherapy with Nivo/Ipi  tx last given 9/10/20  Hold all Onc tx during admission  pt denies diarrhea/SOB  Pulse Ox 100% on RA  CT c/w known breast and pancreatic masses Bx proven RCC   Venofer 200mg IV qd x 4d (can complete pending doses as an outpt when pt ready for discharge)  upon discharge pt to f/u with Oncologist Dr. Outlook  supplement vit b12   h/h stable  hgba1c 7.5 noted above  endo f/u   cont Lantus to 60 units and Humalog 18 units TID   hss  serum creat wnl   for rash solumedrol x1 and benadryl 25 mg q 6 prn  rash resolved  cont current med   pt stable for d/c

## 2020-09-26 NOTE — PROGRESS NOTE ADULT - PROBLEM SELECTOR PROBLEM 7
HTN (hypertension)
UTI (urinary tract infection)
HTN (hypertension)
HTN (hypertension)

## 2020-09-26 NOTE — PROGRESS NOTE ADULT - PROBLEM SELECTOR PROBLEM 1
Sepsis
UTI (urinary tract infection)

## 2020-09-26 NOTE — PROGRESS NOTE ADULT - SUBJECTIVE AND OBJECTIVE BOX
Patient is seen and examined at the bed side, is afebrile.  She continues to feel better. The rash of upper extremities resolved completely.         REVIEW OF SYSTEMS: All other review systems are negative        ALLERGIES: sulfa drugs (Hives)        Vital Signs Last 24 Hrs  T(C): 36.3 (26 Sep 2020 05:14), Max: 36.9 (25 Sep 2020 21:10)  T(F): 97.3 (26 Sep 2020 05:14), Max: 98.5 (25 Sep 2020 21:10)  HR: 75 (26 Sep 2020 05:14) (73 - 81)  BP: 135/70 (26 Sep 2020 05:14) (113/57 - 135/70)  BP(mean): --  RR: 18 (26 Sep 2020 05:14) (17 - 18)  SpO2: 100% (26 Sep 2020 05:14) (97% - 100%)        PHYSICAL EXAM:  GENERAL: Not in distress   CHEST/LUNG: Not using accessory muscles  HEART: s1 and s2 present  ABDOMEN:  Nontender and  Nondistended  EXTREMITIES: No pedal  edema  CNS: Awake and Alert        LABS:                        9.7    4.87  )-----------( 242      ( 26 Sep 2020 06:36 )             28.5                           9.3    3.34  )-----------( 175      ( 25 Sep 2020 07:12 )             28.5         09-26    136  |  104  |  21<H>  ----------------------------<  244<H>  4.0   |  26  |  1.25    Ca    8.9      26 Sep 2020 06:36    TPro  7.4  /  Alb  2.5<L>  /  TBili  0.4  /  DBili  x   /  AST  37  /  ALT  54  /  AlkPhos  94  09-26    09-25    132<L>  |  101  |  15  ----------------------------<  307<H>  3.8   |  20<L>  |  1.37<H>    Ca    8.4      25 Sep 2020 07:12    TPro  x   /  Alb  3.7  /  TBili  x   /  DBili  x   /  AST  x   /  ALT  x   /  AlkPhos  x       PT/INR - ( 21 Sep 2020 14:00 )   PT: 13.4 sec;   INR: 1.15 ratio    PTT - ( 21 Sep 2020 14:00 )  PTT:30.6 sec      CAPILLARY BLOOD GLUCOSE  POCT Blood Glucose.: 234 mg/dL (21 Sep 2020 17:32)      Urinalysis Basic - ( 21 Sep 2020 14:00 )  Color: Yellow / Appearance: Clear / S.010 / pH: x  Gluc: x / Ketone: Negative  / Bili: Negative / Urobili: Negative   Blood: x / Protein: Negative / Nitrite: Positive   Leuk Esterase: Moderate / RBC: 2-5 /HPF / WBC >50 /HPF   Sq Epi: x / Non Sq Epi: Moderate /HPF / Bacteria: Many /HPF        MEDICATIONS  (STANDING):    amoxicillin  875 milliGRAM(s)/clavulanate 1 Tablet(s) Oral two times a day  atorvastatin 40 milliGRAM(s) Oral at bedtime  budesonide  80 MICROgram(s)/formoterol 4.5 MICROgram(s) Inhaler 2 Puff(s) Inhalation two times a day  ergocalciferol 72159 Unit(s) Oral <User Schedule>  heparin   Injectable 5000 Unit(s) SubCutaneous every 8 hours  insulin glargine Injectable (LANTUS) 60 Unit(s) SubCutaneous every morning  insulin lispro (HumaLOG) corrective regimen sliding scale   SubCutaneous three times a day before meals  insulin lispro Injectable (HumaLOG) 18 Unit(s) SubCutaneous three times a day before meals  iron sucrose IVPB 200 milliGRAM(s) IV Intermittent every 24 hours  levothyroxine 88 MICROGram(s) Oral <User Schedule>  pantoprazole    Tablet 40 milliGRAM(s) Oral before breakfast  sodium chloride 0.9%. 1000 milliLiter(s) (75 mL/Hr) IV Continuous <Continuous>  Triglide (fenofibrate) 160 milliGRAM(s) 160 milliGRAM(s) Oral daily        RADIOLOGY & ADDITIONAL TESTS:    20 : CT Abdomen and Pelvis No Cont (20 @ 21:21) 5 mm right middle lobe pulmonary nodule. No parenchymal consolidation or pleural effusion.    Left supraclavicular lymphadenopathy.    2.2 x 1.8 cm ovoid right posterior breast lesion, with associated surgical clip. Correlate with mammographic and postsurgical results.    Hypodensity within the pancreatic body as well as ill-defined and enlargement of the pancreatic head, concerning for underlying lesions and may correspond to patient's known pancreatic metastatic disease. Correlation with prior outside imaging, if available is recommended to evaluate for interval change. Evaluation is limited without the administration of intravenous contrast material.    Retroperitoneal lymphadenopathy.    Left nephrectomy. No nuvia right renal hydronephrosis. Evaluation is limited by motion.    2.3 x 1.8 cm posterior ovoid right breast lesion, with associated surgical clip. Correlate with mammographic findings and/or postsurgical results.          20: CT Chest No Cont (20 @ 21:21) LUNGS AND LARGE AIRWAYS: Patent central airways. 5 mm right middle lobe pulmonary nodule (2:87) evaluation of the lung parenchyma is limited by significantrespiratory motion.  PLEURA: No pleural effusion.    VESSELS: Within normal limits.      20 : Xray Chest 1 View-PORTABLE IMMEDIATE (20 @ 13:41) : No evidence for pulmonary consolidation, pleural effusion or pneumothorax.    Small left basilar atelectasis.  The trachea is midline.   The cardiac silhouette is magnified by AP technique.        MICROBIOLOGY DATA:      Culture - Blood (20 @ 22:06)   Specimen Source: .Blood Blood-Peripheral   Culture Results:   No growth to date.     Culture - Blood (20 @ 22:06)   Specimen Source: .Blood Blood-Peripheral   Culture Results:   No growth to date.     Culture - Urine (20 @ 18:57)   - Amikacin: S <=16   - Amoxicillin/Clavulanic Acid: S <=8/4   - Ampicillin: S <=8 These ampicillin results predict results for amoxicillin   - Ampicillin/Sulbactam: S <=4/2 Enterobacter, Citrobacter, and Serratia may develop resistance during prolonged therapy (3-4 days)   - Aztreonam: S <=4   - Cefazolin: S <=2 ([email or web address]) For uncomplicated UTI with K. pneumoniae, E. coli, or P. mirablis: STONEY <=16 is sensitive and STONEY >=32 is resistant. This also predicts results for oral agents cefaclor, cefdinir, cefpodoxime, cefprozil, cefuroxime axetil, cephalexin and locarbef for uncomplicated UTI. Note that some isolates may be susceptible to these agents while testing resistant to cefazolin.   - Cefepime: S <=2   - Cefoxitin: S <=8   - Ceftriaxone: S <=1 Enterobacter, Citrobacter, and Serratia may develop resistance during prolonged therapy   - Ciprofloxacin: I 0.5   - Ertapenem: S <=0.5   - Gentamicin: S <=2   - Imipenem: S <=1   - Levofloxacin: I 1   - Meropenem: S <=1   - Nitrofurantoin: S <=32 Should not be used to treat pyelonephritis   - Piperacillin/Tazobactam: S <=8   - Tigecycline: S <=2   - Tobramycin: S <=2   - Trimethoprim/Sulfamethoxazole: R >   Specimen Source: .Urine Clean Catch (Midstream)   Culture Results:   >100,000 CFU/ml Escherichia coli   Organism Identification: Escherichia coli   Organism: Escherichia coli   Method Type: STONEY   Respiratory Viral/Bacti Detection by RUTHY (20 @ 20:56)   Rapid RVP Result: NotDetec   COVID-19 PCR . (20 @ 14:00)   COVID-19 PCR: NotDetec: Testing is performed using polymerase chain reaction (PCR) or   transcription mediated amplification (TMA).    Culture - Urine (20 @ 18:57)   Specimen Source: .Urine Clean Catch (Midstream)   Culture Results:   >100,000 CFU/ml Escherichia coli            Patient is seen and examined at the bed side, is afebrile.  No new events.        REVIEW OF SYSTEMS: All other review systems are negative        ALLERGIES: sulfa drugs (Hives)        Vital Signs Last 24 Hrs  T(C): 36.3 (26 Sep 2020 05:14), Max: 36.9 (25 Sep 2020 21:10)  T(F): 97.3 (26 Sep 2020 05:14), Max: 98.5 (25 Sep 2020 21:10)  HR: 75 (26 Sep 2020 05:14) (73 - 81)  BP: 135/70 (26 Sep 2020 05:14) (113/57 - 135/70)  BP(mean): --  RR: 18 (26 Sep 2020 05:14) (17 - 18)  SpO2: 100% (26 Sep 2020 05:14) (97% - 100%)        PHYSICAL EXAM:  GENERAL: Not in distress   CHEST/LUNG: Not using accessory muscles  HEART: s1 and s2 present  ABDOMEN:  Nontender and  Nondistended  EXTREMITIES: No pedal  edema  CNS: Awake and Alert        LABS:                        9.7    4.87  )-----------( 242      ( 26 Sep 2020 06:36 )             28.5                           9.3    3.34  )-----------( 175      ( 25 Sep 2020 07:12 )             28.5         09-26    136  |  104  |  21<H>  ----------------------------<  244<H>  4.0   |  26  |  1.25    Ca    8.9      26 Sep 2020 06:36    TPro  7.4  /  Alb  2.5<L>  /  TBili  0.4  /  DBili  x   /  AST  37  /  ALT  54  /  AlkPhos  94  -    09-25    132<L>  |  101  |  15  ----------------------------<  307<H>  3.8   |  20<L>  |  1.37<H>    Ca    8.4      25 Sep 2020 07:12    TPro  x   /  Alb  3.7  /  TBili  x   /  DBili  x   /  AST  x   /  ALT  x   /  AlkPhos  x       PT/INR - ( 21 Sep 2020 14:00 )   PT: 13.4 sec;   INR: 1.15 ratio    PTT - ( 21 Sep 2020 14:00 )  PTT:30.6 sec      CAPILLARY BLOOD GLUCOSE  POCT Blood Glucose.: 234 mg/dL (21 Sep 2020 17:32)      Urinalysis Basic - ( 21 Sep 2020 14:00 )  Color: Yellow / Appearance: Clear / S.010 / pH: x  Gluc: x / Ketone: Negative  / Bili: Negative / Urobili: Negative   Blood: x / Protein: Negative / Nitrite: Positive   Leuk Esterase: Moderate / RBC: 2-5 /HPF / WBC >50 /HPF   Sq Epi: x / Non Sq Epi: Moderate /HPF / Bacteria: Many /HPF        MEDICATIONS  (STANDING):    amoxicillin  875 milliGRAM(s)/clavulanate 1 Tablet(s) Oral two times a day  atorvastatin 40 milliGRAM(s) Oral at bedtime  budesonide  80 MICROgram(s)/formoterol 4.5 MICROgram(s) Inhaler 2 Puff(s) Inhalation two times a day  ergocalciferol 63859 Unit(s) Oral <User Schedule>  heparin   Injectable 5000 Unit(s) SubCutaneous every 8 hours  insulin glargine Injectable (LANTUS) 60 Unit(s) SubCutaneous every morning  insulin lispro (HumaLOG) corrective regimen sliding scale   SubCutaneous three times a day before meals  insulin lispro Injectable (HumaLOG) 18 Unit(s) SubCutaneous three times a day before meals  iron sucrose IVPB 200 milliGRAM(s) IV Intermittent every 24 hours  levothyroxine 88 MICROGram(s) Oral <User Schedule>  pantoprazole    Tablet 40 milliGRAM(s) Oral before breakfast  sodium chloride 0.9%. 1000 milliLiter(s) (75 mL/Hr) IV Continuous <Continuous>  Triglide (fenofibrate) 160 milliGRAM(s) 160 milliGRAM(s) Oral daily        RADIOLOGY & ADDITIONAL TESTS:    20 : CT Abdomen and Pelvis No Cont (20 @ 21:21) 5 mm right middle lobe pulmonary nodule. No parenchymal consolidation or pleural effusion.    Left supraclavicular lymphadenopathy.    2.2 x 1.8 cm ovoid right posterior breast lesion, with associated surgical clip. Correlate with mammographic and postsurgical results.    Hypodensity within the pancreatic body as well as ill-defined and enlargement of the pancreatic head, concerning for underlying lesions and may correspond to patient's known pancreatic metastatic disease. Correlation with prior outside imaging, if available is recommended to evaluate for interval change. Evaluation is limited without the administration of intravenous contrast material.    Retroperitoneal lymphadenopathy.    Left nephrectomy. No nuvia right renal hydronephrosis. Evaluation is limited by motion.    2.3 x 1.8 cm posterior ovoid right breast lesion, with associated surgical clip. Correlate with mammographic findings and/or postsurgical results.          20: CT Chest No Cont (20 @ 21:21) LUNGS AND LARGE AIRWAYS: Patent central airways. 5 mm right middle lobe pulmonary nodule (2:87) evaluation of the lung parenchyma is limited by significantrespiratory motion.  PLEURA: No pleural effusion.    VESSELS: Within normal limits.      20 : Xray Chest 1 View-PORTABLE IMMEDIATE (20 @ 13:41) : No evidence for pulmonary consolidation, pleural effusion or pneumothorax.    Small left basilar atelectasis.  The trachea is midline.   The cardiac silhouette is magnified by AP technique.        MICROBIOLOGY DATA:      Culture - Blood (20 @ 22:06)   Specimen Source: .Blood Blood-Peripheral   Culture Results:   No growth to date.     Culture - Blood (20 @ 22:06)   Specimen Source: .Blood Blood-Peripheral   Culture Results:   No growth to date.     Culture - Urine (20 @ 18:57)   - Amikacin: S <=16   - Amoxicillin/Clavulanic Acid: S <=8/4   - Ampicillin: S <=8 These ampicillin results predict results for amoxicillin   - Ampicillin/Sulbactam: S <=4/2 Enterobacter, Citrobacter, and Serratia may develop resistance during prolonged therapy (3-4 days)   - Aztreonam: S <=4   - Cefazolin: S <=2 (MIC_CL_COM_[email or web address]) For uncomplicated UTI with K. pneumoniae, E. coli, or P. mirablis: STONEY <=16 is sensitive and STONEY >=32 is resistant. This also predicts results for oral agents cefaclor, cefdinir, cefpodoxime, cefprozil, cefuroxime axetil, cephalexin and locarbef for uncomplicated UTI. Note that some isolates may be susceptible to these agents while testing resistant to cefazolin.   - Cefepime: S <=2   - Cefoxitin: S <=8   - Ceftriaxone: S <=1 Enterobacter, Citrobacter, and Serratia may develop resistance during prolonged therapy   - Ciprofloxacin: I 0.5   - Ertapenem: S <=0.5   - Gentamicin: S <=2   - Imipenem: S <=1   - Levofloxacin: I 1   - Meropenem: S <=1   - Nitrofurantoin: S <=32 Should not be used to treat pyelonephritis   - Piperacillin/Tazobactam: S <=8   - Tigecycline: S <=2   - Tobramycin: S <=2   - Trimethoprim/Sulfamethoxazole: R >   Specimen Source: .Urine Clean Catch (Midstream)   Culture Results:   >100,000 CFU/ml Escherichia coli   Organism Identification: Escherichia coli   Organism: Escherichia coli   Method Type: STONEY   Respiratory Viral/Bacti Detection by RUTHY (20 @ 20:56)   Rapid RVP Result: NotDetec   COVID-19 PCR . (20 @ 14:00)   COVID-19 PCR: NotDetec: Testing is performed using polymerase chain reaction (PCR) or   transcription mediated amplification (TMA).    Culture - Urine (20 @ 18:57)   Specimen Source: .Urine Clean Catch (Midstream)   Culture Results:   >100,000 CFU/ml Escherichia coli

## 2020-09-26 NOTE — PROGRESS NOTE ADULT - SUBJECTIVE AND OBJECTIVE BOX
Interval Events:  pt in nad    Allergies    sulfa drugs (Hives)    Intolerances      Endocrine/Metabolic Medications:  atorvastatin 40 milliGRAM(s) Oral at bedtime  insulin glargine Injectable (LANTUS) 60 Unit(s) SubCutaneous every morning  insulin lispro (HumaLOG) corrective regimen sliding scale   SubCutaneous three times a day before meals  insulin lispro Injectable (HumaLOG) 18 Unit(s) SubCutaneous three times a day before meals  levothyroxine 88 MICROGram(s) Oral <User Schedule>      Vital Signs Last 24 Hrs  T(C): 36.3 (26 Sep 2020 05:14), Max: 36.9 (25 Sep 2020 21:10)  T(F): 97.3 (26 Sep 2020 05:14), Max: 98.5 (25 Sep 2020 21:10)  HR: 75 (26 Sep 2020 05:14) (73 - 82)  BP: 135/70 (26 Sep 2020 05:14) (109/59 - 135/70)  BP(mean): --  RR: 18 (26 Sep 2020 05:14) (17 - 18)  SpO2: 100% (26 Sep 2020 05:14) (97% - 100%)      PHYSICAL EXAM  All physical exam findings normal, except those marked:  General:	Alert, active, cooperative, NAD, well hydrated  .		[] Abnormal:  Neck		Normal: supple, no cervical adenopathy, no palpable thyroid  .		[] Abnormal:  Cardiovascular	Normal: regular rate, normal S1, S2, no murmurs  .		[] Abnormal:  Respiratory	Normal: no chest wall deformity, normal respiratory pattern, CTA B/L  .		[] Abnormal:  Abdominal	Normal: soft, ND, NT, bowel sounds present, no masses, no organomegaly  .		[] Abnormal:  		Normal normal genitalia, testes descended, circumcised/uncircumcised  .		Phil stage:			Breast phil:  .		Menstrual history:  .		[] Abnormal:  Extremities	Normal: FROM x4  .		[] Abnormal:  Skin		Normal: intact and not indurated, no rash, no acanthosis nigricans  .		[] Abnormal:  Neurologic	Normal: grossly intact  .		[] Abnormal:    LABS                        9.7    4.87  )-----------( 242      ( 26 Sep 2020 06:36 )             28.5                               136    |  104    |  21                  Calcium: 8.9   / iCa: x      (09-26 @ 06:36)    ----------------------------<  244       Magnesium: x                                4.0     |  26     |  1.25             Phosphorous: x        TPro  7.4    /  Alb  2.5    /  TBili  0.4    /  DBili  x      /  AST  37     /  ALT  54     /  AlkPhos  94     26 Sep 2020 06:36    CAPILLARY BLOOD GLUCOSE      POCT Blood Glucose.: 220 mg/dL (26 Sep 2020 11:13)  POCT Blood Glucose.: 277 mg/dL (26 Sep 2020 07:41)  POCT Blood Glucose.: 275 mg/dL (25 Sep 2020 21:07)  POCT Blood Glucose.: 281 mg/dL (25 Sep 2020 17:04)  POCT Blood Glucose.: 278 mg/dL (25 Sep 2020 13:33)        Assesment/plan    59 y/o F from home, living alone with CDPAP program (mohamud Collins is a caregiver 5hrs/ day 5days/wk)  with a significant PMHx of HTN, DM and metastatic renal CA (since 2008, mets to breast and pancreas, on chemo x about a year every 2 weeks),  PSH of Left kidney removal in 2008 and bladder reconstruction in 2008, hysterectomy presents to the ED with nonproductive cough x 4 days associated with fever and chills. Admitted for Sepsis secondary to UTI.     Endocrinology service was consulted given uncontrolled blood sugars during hospital stay.     1. DM with uncontrolled blood sugar:   -Patient has hx of DM, takes Basaglar 65 units at night, Admelog 20 in morning, 28 afternoon and 22 in evening at home.   with occasional hypoglycemia  -HbA1C: 7.5%  -Remains hyperglycemic   cont Lantus 60 units every morning .  change Humalog to 20 units and moderate HSS   -C/w moderate HSS  d/w NP     2. Hypothyroidism:    -Patient has hx of hypothyroidism, on levothyroxine   TSH mildly elevated to 6.34    Free T4 wnl   Would c/w levothyroxine home dose

## 2020-09-26 NOTE — PROGRESS NOTE ADULT - REASON FOR ADMISSION
sepsis

## 2020-09-26 NOTE — PROGRESS NOTE ADULT - SUBJECTIVE AND OBJECTIVE BOX
Pt is awake, alert, lying in bed in NAD. Feels well. Denies CP or SOB at present.     INTERVAL HPI/OVERNIGHT EVENTS:      VITAL SIGNS:  T(F): 97.3 (09-26-20 @ 05:14)  HR: 75 (09-26-20 @ 05:14)  BP: 135/70 (09-26-20 @ 05:14)  RR: 18 (09-26-20 @ 05:14)  SpO2: 100% (09-26-20 @ 05:14)  Wt(kg): --  I&O's Detail          REVIEW OF SYSTEMS:    CONSTITUTIONAL:  No fevers, chills, sweats    HEENT:  Eyes:  No diplopia or blurred vision. ENT:  No earache, sore throat or runny nose.    CARDIOVASCULAR:  No pressure, squeezing, tightness, or heaviness about the chest; no palpitations.    RESPIRATORY:  Per HPI    GASTROINTESTINAL:  No abdominal pain, nausea, vomiting or diarrhea.    GENITOURINARY:  No dysuria, frequency or urgency.    NEUROLOGIC:  No paresthesias, fasciculations, seizures or weakness.    PSYCHIATRIC:  No disorder of thought or mood.      PHYSICAL EXAM:    Constitutional: Well developed and nourished  Eyes:Perrla  ENMT: normal  Neck:supple  Respiratory: good air entry  Cardiovascular: S1 S2 regular  Gastrointestinal: Soft, Non tender  Extremities: No edema  Vascular:normal  Neurological:Awake, alert,Ox3  Musculoskeletal:Normal      MEDICATIONS  (STANDING):  amoxicillin  875 milliGRAM(s)/clavulanate 1 Tablet(s) Oral two times a day  atorvastatin 40 milliGRAM(s) Oral at bedtime  budesonide  80 MICROgram(s)/formoterol 4.5 MICROgram(s) Inhaler 2 Puff(s) Inhalation two times a day  ergocalciferol 76587 Unit(s) Oral <User Schedule>  heparin   Injectable 5000 Unit(s) SubCutaneous every 8 hours  insulin glargine Injectable (LANTUS) 60 Unit(s) SubCutaneous every morning  insulin lispro (HumaLOG) corrective regimen sliding scale   SubCutaneous three times a day before meals  insulin lispro Injectable (HumaLOG) 18 Unit(s) SubCutaneous three times a day before meals  iron sucrose IVPB 200 milliGRAM(s) IV Intermittent every 24 hours  levothyroxine 88 MICROGram(s) Oral <User Schedule>  pantoprazole    Tablet 40 milliGRAM(s) Oral before breakfast  sodium chloride 0.9%. 1000 milliLiter(s) (75 mL/Hr) IV Continuous <Continuous>  Triglide (fenofibrate) 160 milliGRAM(s) 160 milliGRAM(s) Oral daily    MEDICATIONS  (PRN):  acetaminophen   Tablet .. 650 milliGRAM(s) Oral every 6 hours PRN Temp greater or equal to 38C (100.4F)  ALBUTerol    90 MICROgram(s) HFA Inhaler 2 Puff(s) Inhalation every 6 hours PRN Bronchospasm  diphenhydrAMINE 25 milliGRAM(s) Oral every 6 hours PRN Rash and/or Itching      Allergies    sulfa drugs (Hives)    Intolerances        LABS:                        9.7    4.87  )-----------( 242      ( 26 Sep 2020 06:36 )             28.5     09-26    136  |  104  |  21<H>  ----------------------------<  244<H>  4.0   |  26  |  1.25    Ca    8.9      26 Sep 2020 06:36    TPro  7.4  /  Alb  2.5<L>  /  TBili  0.4  /  DBili  x   /  AST  37  /  ALT  54  /  AlkPhos  94  09-26              CAPILLARY BLOOD GLUCOSE      POCT Blood Glucose.: 277 mg/dL (26 Sep 2020 07:41)  POCT Blood Glucose.: 275 mg/dL (25 Sep 2020 21:07)  POCT Blood Glucose.: 281 mg/dL (25 Sep 2020 17:04)  POCT Blood Glucose.: 278 mg/dL (25 Sep 2020 13:33)  POCT Blood Glucose.: 319 mg/dL (25 Sep 2020 11:40)        RADIOLOGY & ADDITIONAL TESTS:    CXR:    Ct scan chest:    ekg;    echo:  e

## 2020-09-26 NOTE — PROGRESS NOTE ADULT - SUBJECTIVE AND OBJECTIVE BOX
INTERVAL HPI: Patient seen and examined at bedside; Events noted; Patient c/o     PMH/PSH as above    FmHx/Sox Hx NC    ROS as above; pt is not able to provide detailed review of systems  General: Noncontributory;	Skin/Breast: NC;Ophthalmologic:NC; ENMT: NC; Respiratory and Thorax: NC; Cardiovascular: NC; 	  Gastrointestinal: NC; Genitourinary:NC; 	Musculoskeletal:NC; Neurological: NC; Psychiatric: NC; Hematology/Lymphatics: NC; Endocrine: NC; Allergic/Immunologic: NC    MEDICATIONS  (STANDING):  amoxicillin  875 milliGRAM(s)/clavulanate 1 Tablet(s) Oral two times a day  atorvastatin 40 milliGRAM(s) Oral at bedtime  budesonide  80 MICROgram(s)/formoterol 4.5 MICROgram(s) Inhaler 2 Puff(s) Inhalation two times a day  ergocalciferol 18003 Unit(s) Oral <User Schedule>  heparin   Injectable 5000 Unit(s) SubCutaneous every 8 hours  insulin glargine Injectable (LANTUS) 60 Unit(s) SubCutaneous every morning  insulin lispro (HumaLOG) corrective regimen sliding scale   SubCutaneous three times a day before meals  insulin lispro Injectable (HumaLOG) 18 Unit(s) SubCutaneous three times a day before meals  iron sucrose IVPB 200 milliGRAM(s) IV Intermittent every 24 hours  levothyroxine 88 MICROGram(s) Oral <User Schedule>  pantoprazole    Tablet 40 milliGRAM(s) Oral before breakfast  sodium chloride 0.9%. 1000 milliLiter(s) (75 mL/Hr) IV Continuous <Continuous>  Triglide (fenofibrate) 160 milliGRAM(s) 160 milliGRAM(s) Oral daily    MEDICATIONS  (PRN):  acetaminophen   Tablet .. 650 milliGRAM(s) Oral every 6 hours PRN Temp greater or equal to 38C (100.4F)  ALBUTerol    90 MICROgram(s) HFA Inhaler 2 Puff(s) Inhalation every 6 hours PRN Bronchospasm  diphenhydrAMINE 25 milliGRAM(s) Oral every 6 hours PRN Rash and/or Itching      Vital Signs Last 24 Hrs  T(C): 36.3 (26 Sep 2020 05:14), Max: 36.9 (25 Sep 2020 21:10)  T(F): 97.3 (26 Sep 2020 05:14), Max: 98.5 (25 Sep 2020 21:10)  HR: 75 (26 Sep 2020 05:14) (73 - 81)  BP: 135/70 (26 Sep 2020 05:14) (112/61 - 135/70)  BP(mean): --  RR: 18 (26 Sep 2020 05:14) (17 - 18)  SpO2: 100% (26 Sep 2020 05:14) (97% - 100%)  _________________  PHYSICAL EXAM:  ---------------------------  GEN: NAD; NC/AT; A and O x   LUNGS: no wheezing; decreased bilateral air entry; no use of accessory muscles for breathing  HEART: Nl S1 S2; no M   ABDOMEN: Soft, Nontender, non distended  EXTREMITIES: no cyanosis; no edema; warm and dry  NERVOUS SYSTEM:  Awake and alert; no focal neuro  deficits    _________________________________________________  LABS:                        9.7    4.87  )-----------( 242      ( 26 Sep 2020 06:36 )             28.5     09-26    136  |  104  |  21<H>  ----------------------------<  244<H>  4.0   |  26  |  1.25    Ca    8.9      26 Sep 2020 06:36    TPro  7.4  /  Alb  2.5<L>  /  TBili  0.4  /  DBili  x   /  AST  37  /  ALT  54  /  AlkPhos  94  09-26      CAPILLARY BLOOD GLUCOSE      POCT Blood Glucose.: 220 mg/dL (26 Sep 2020 11:13)  POCT Blood Glucose.: 277 mg/dL (26 Sep 2020 07:41)  POCT Blood Glucose.: 275 mg/dL (25 Sep 2020 21:07)  POCT Blood Glucose.: 281 mg/dL (25 Sep 2020 17:04)  POCT Blood Glucose.: 278 mg/dL (25 Sep 2020 13:33)                 INTERVAL HPI: Interview done via language line ; Patient seen and examined at bedside; Events noted; Patient w/o complaint    PMH/PSH as above    FmHx/Soc Hx NC    ROS as above; pt is not able to provide detailed review of systems; General: Noncontributory; Skin/Breast: NC;Ophthalmologic:NC; ENMT: NC; Respiratory and Thorax: NC; Cardiovascular: NC; 	  Gastrointestinal: NC; Genitourinary:NC; 	Musculoskeletal:NC; Neurological: NC; Psychiatric: NC; Hematology/Lymphatics: NC; Endocrine: NC; Allergic/Immunologic: NC    MEDICATIONS  (STANDING):  amoxicillin  875 milliGRAM(s)/clavulanate 1 Tablet(s) Oral two times a day  atorvastatin 40 milliGRAM(s) Oral at bedtime  budesonide  80 MICROgram(s)/formoterol 4.5 MICROgram(s) Inhaler 2 Puff(s) Inhalation two times a day  ergocalciferol 98356 Unit(s) Oral <User Schedule>  heparin   Injectable 5000 Unit(s) SubCutaneous every 8 hours  insulin glargine Injectable (LANTUS) 60 Unit(s) SubCutaneous every morning  insulin lispro (HumaLOG) corrective regimen sliding scale   SubCutaneous three times a day before meals  insulin lispro Injectable (HumaLOG) 18 Unit(s) SubCutaneous three times a day before meals  iron sucrose IVPB 200 milliGRAM(s) IV Intermittent every 24 hours  levothyroxine 88 MICROGram(s) Oral <User Schedule>  pantoprazole    Tablet 40 milliGRAM(s) Oral before breakfast  sodium chloride 0.9%. 1000 milliLiter(s) (75 mL/Hr) IV Continuous <Continuous>  Triglide (fenofibrate) 160 milliGRAM(s) 160 milliGRAM(s) Oral daily    MEDICATIONS  (PRN):  acetaminophen   Tablet .. 650 milliGRAM(s) Oral every 6 hours PRN Temp greater or equal to 38C (100.4F)  ALBUTerol    90 MICROgram(s) HFA Inhaler 2 Puff(s) Inhalation every 6 hours PRN Bronchospasm  diphenhydrAMINE 25 milliGRAM(s) Oral every 6 hours PRN Rash and/or Itching      Vital Signs Last 24 Hrs  T(C): 36.3 (26 Sep 2020 05:14), Max: 36.9 (25 Sep 2020 21:10)  T(F): 97.3 (26 Sep 2020 05:14), Max: 98.5 (25 Sep 2020 21:10)  HR: 75 (26 Sep 2020 05:14) (73 - 81)  BP: 135/70 (26 Sep 2020 05:14) (112/61 - 135/70)  RR: 18 (26 Sep 2020 05:14) (17 - 18)  SpO2: 100% (26 Sep 2020 05:14) (97% - 100%)  _________________  PHYSICAL EXAM:  ---------------------------  GEN: NAD; NC/AT; A and O x 3  LUNGS: no wheezing; decreased bilateral air entry; no use of accessory muscles for breathing  HEART: Nl S1 S2; no M   ABDOMEN: Soft, Nontender, non distended  EXTREMITIES: no cyanosis; no edema; warm and dry  NERVOUS SYSTEM:  Awake and alert; no focal neuro  deficits    _________________________________________________  LABS:                        9.7    4.87  )-----------( 242      ( 26 Sep 2020 06:36 )             28.5     09-26    136  |  104  |  21<H>  ----------------------------<  244<H>  4.0   |  26  |  1.25    Ca    8.9      26 Sep 2020 06:36    TPro  7.4  /  Alb  2.5<L>  /  TBili  0.4  /  DBili  x   /  AST  37  /  ALT  54  /  AlkPhos  94  09-26      CAPILLARY BLOOD GLUCOSE      POCT Blood Glucose.: 220 mg/dL (26 Sep 2020 11:13)  POCT Blood Glucose.: 277 mg/dL (26 Sep 2020 07:41)  POCT Blood Glucose.: 275 mg/dL (25 Sep 2020 21:07)  POCT Blood Glucose.: 281 mg/dL (25 Sep 2020 17:04)  POCT Blood Glucose.: 278 mg/dL (25 Sep 2020 13:33)

## 2020-09-26 NOTE — PROGRESS NOTE ADULT - ASSESSMENT
Problem #1 Stage IV RCC on palliative tx w/ nivolumab/ipi (next session due on Thursday (10/1/20) in the outpt [facility]    Problem#2 Anemia - multifactorial; asymptomatic; no need for PRBC unless Hg < 7 g/dL or pt is symptomatic  -complete parenteral iron tx    Problem# 3 Sepsis - fever defervescing w/ abx  -appreciate ID input    VTE prophylaxis; please call with questions [phone]; OOB prn

## 2020-09-26 NOTE — PROGRESS NOTE ADULT - PROVIDER SPECIALTY LIST ADULT
Endocrinology
Heme/Onc
Infectious Disease
Internal Medicine
Pulmonology
Endocrinology
Internal Medicine

## 2020-09-26 NOTE — DISCHARGE NOTE NURSING/CASE MANAGEMENT/SOCIAL WORK - PATIENT PORTAL LINK FT
You can access the FollowMyHealth Patient Portal offered by [facility] by registering at the following website: [email or web address]. By joining [facility]’s FollowMyHealth portal, you will also be able to view your health information using other applications (apps) compatible with our system.

## 2024-01-17 ENCOUNTER — INPATIENT (INPATIENT)
Facility: HOSPITAL | Age: 64
LOS: 2 days | Discharge: HOME | DRG: 683 | End: 2024-01-20
Attending: INTERNAL MEDICINE | Admitting: INTERNAL MEDICINE
Payer: MEDICAID

## 2024-01-17 VITALS
RESPIRATION RATE: 17 BRPM | HEIGHT: 64 IN | SYSTOLIC BLOOD PRESSURE: 157 MMHG | HEART RATE: 77 BPM | DIASTOLIC BLOOD PRESSURE: 86 MMHG | WEIGHT: 182.1 LBS | TEMPERATURE: 98 F | OXYGEN SATURATION: 95 %

## 2024-01-17 DIAGNOSIS — Z90.710 ACQUIRED ABSENCE OF BOTH CERVIX AND UTERUS: Chronic | ICD-10-CM

## 2024-01-17 DIAGNOSIS — Z29.9 ENCOUNTER FOR PROPHYLACTIC MEASURES, UNSPECIFIED: ICD-10-CM

## 2024-01-17 DIAGNOSIS — M62.82 RHABDOMYOLYSIS: ICD-10-CM

## 2024-01-17 DIAGNOSIS — Z98.890 OTHER SPECIFIED POSTPROCEDURAL STATES: Chronic | ICD-10-CM

## 2024-01-17 DIAGNOSIS — N17.9 ACUTE KIDNEY FAILURE, UNSPECIFIED: ICD-10-CM

## 2024-01-17 DIAGNOSIS — E03.9 HYPOTHYROIDISM, UNSPECIFIED: ICD-10-CM

## 2024-01-17 DIAGNOSIS — I10 ESSENTIAL (PRIMARY) HYPERTENSION: ICD-10-CM

## 2024-01-17 DIAGNOSIS — E11.9 TYPE 2 DIABETES MELLITUS WITHOUT COMPLICATIONS: ICD-10-CM

## 2024-01-17 DIAGNOSIS — R74.8 ABNORMAL LEVELS OF OTHER SERUM ENZYMES: ICD-10-CM

## 2024-01-17 DIAGNOSIS — C64.9 MALIGNANT NEOPLASM OF UNSPECIFIED KIDNEY, EXCEPT RENAL PELVIS: ICD-10-CM

## 2024-01-17 DIAGNOSIS — E78.5 HYPERLIPIDEMIA, UNSPECIFIED: ICD-10-CM

## 2024-01-17 DIAGNOSIS — N39.0 URINARY TRACT INFECTION, SITE NOT SPECIFIED: ICD-10-CM

## 2024-01-17 LAB
ALBUMIN SERPL ELPH-MCNC: 2.4 G/DL — LOW (ref 3.5–5)
ALP SERPL-CCNC: 108 U/L — SIGNIFICANT CHANGE UP (ref 40–120)
ALT FLD-CCNC: 49 U/L DA — SIGNIFICANT CHANGE UP (ref 10–60)
ANION GAP SERPL CALC-SCNC: 4 MMOL/L — LOW (ref 5–17)
APPEARANCE UR: CLEAR — SIGNIFICANT CHANGE UP
AST SERPL-CCNC: 56 U/L — HIGH (ref 10–40)
BACTERIA # UR AUTO: ABNORMAL /HPF
BASOPHILS # BLD AUTO: 0.03 K/UL — SIGNIFICANT CHANGE UP (ref 0–0.2)
BASOPHILS NFR BLD AUTO: 0.4 % — SIGNIFICANT CHANGE UP (ref 0–2)
BILIRUB SERPL-MCNC: 0.5 MG/DL — SIGNIFICANT CHANGE UP (ref 0.2–1.2)
BILIRUB UR-MCNC: NEGATIVE — SIGNIFICANT CHANGE UP
BUN SERPL-MCNC: 36 MG/DL — HIGH (ref 7–18)
CALCIUM SERPL-MCNC: 8.8 MG/DL — SIGNIFICANT CHANGE UP (ref 8.4–10.5)
CHLORIDE SERPL-SCNC: 107 MMOL/L — SIGNIFICANT CHANGE UP (ref 96–108)
CK SERPL-CCNC: 1373 U/L — HIGH (ref 21–215)
CO2 SERPL-SCNC: 31 MMOL/L — SIGNIFICANT CHANGE UP (ref 22–31)
COLOR SPEC: YELLOW — SIGNIFICANT CHANGE UP
CREAT SERPL-MCNC: 2.38 MG/DL — HIGH (ref 0.5–1.3)
DIFF PNL FLD: ABNORMAL
EGFR: 22 ML/MIN/1.73M2 — LOW
EOSINOPHIL # BLD AUTO: 0.29 K/UL — SIGNIFICANT CHANGE UP (ref 0–0.5)
EOSINOPHIL NFR BLD AUTO: 3.7 % — SIGNIFICANT CHANGE UP (ref 0–6)
EPI CELLS # UR: PRESENT
GLUCOSE SERPL-MCNC: 155 MG/DL — HIGH (ref 70–99)
GLUCOSE UR QL: 250 MG/DL
HCT VFR BLD CALC: 35.7 % — SIGNIFICANT CHANGE UP (ref 34.5–45)
HGB BLD-MCNC: 11.6 G/DL — SIGNIFICANT CHANGE UP (ref 11.5–15.5)
HYALINE CASTS # UR AUTO: PRESENT
IMM GRANULOCYTES NFR BLD AUTO: 0.4 % — SIGNIFICANT CHANGE UP (ref 0–0.9)
KETONES UR-MCNC: NEGATIVE MG/DL — SIGNIFICANT CHANGE UP
LEUKOCYTE ESTERASE UR-ACNC: ABNORMAL
LYMPHOCYTES # BLD AUTO: 1.91 K/UL — SIGNIFICANT CHANGE UP (ref 1–3.3)
LYMPHOCYTES # BLD AUTO: 24.3 % — SIGNIFICANT CHANGE UP (ref 13–44)
MCHC RBC-ENTMCNC: 30.7 PG — SIGNIFICANT CHANGE UP (ref 27–34)
MCHC RBC-ENTMCNC: 32.5 GM/DL — SIGNIFICANT CHANGE UP (ref 32–36)
MCV RBC AUTO: 94.4 FL — SIGNIFICANT CHANGE UP (ref 80–100)
MONOCYTES # BLD AUTO: 0.63 K/UL — SIGNIFICANT CHANGE UP (ref 0–0.9)
MONOCYTES NFR BLD AUTO: 8 % — SIGNIFICANT CHANGE UP (ref 2–14)
NEUTROPHILS # BLD AUTO: 4.98 K/UL — SIGNIFICANT CHANGE UP (ref 1.8–7.4)
NEUTROPHILS NFR BLD AUTO: 63.2 % — SIGNIFICANT CHANGE UP (ref 43–77)
NITRITE UR-MCNC: NEGATIVE — SIGNIFICANT CHANGE UP
NRBC # BLD: 0 /100 WBCS — SIGNIFICANT CHANGE UP (ref 0–0)
PH UR: 6 — SIGNIFICANT CHANGE UP (ref 5–8)
PLATELET # BLD AUTO: 259 K/UL — SIGNIFICANT CHANGE UP (ref 150–400)
POTASSIUM SERPL-MCNC: 4.1 MMOL/L — SIGNIFICANT CHANGE UP (ref 3.5–5.3)
POTASSIUM SERPL-SCNC: 4.1 MMOL/L — SIGNIFICANT CHANGE UP (ref 3.5–5.3)
PROT SERPL-MCNC: 6.9 G/DL — SIGNIFICANT CHANGE UP (ref 6–8.3)
PROT UR-MCNC: 100 MG/DL
RBC # BLD: 3.78 M/UL — LOW (ref 3.8–5.2)
RBC # FLD: 15.4 % — HIGH (ref 10.3–14.5)
RBC CASTS # UR COMP ASSIST: 15 /HPF — HIGH (ref 0–4)
SODIUM SERPL-SCNC: 142 MMOL/L — SIGNIFICANT CHANGE UP (ref 135–145)
SP GR SPEC: 1.01 — SIGNIFICANT CHANGE UP (ref 1–1.03)
UROBILINOGEN FLD QL: 0.2 MG/DL — SIGNIFICANT CHANGE UP (ref 0.2–1)
WBC # BLD: 7.87 K/UL — SIGNIFICANT CHANGE UP (ref 3.8–10.5)
WBC # FLD AUTO: 7.87 K/UL — SIGNIFICANT CHANGE UP (ref 3.8–10.5)
WBC UR QL: 8 /HPF — HIGH (ref 0–5)

## 2024-01-17 PROCEDURE — 99285 EMERGENCY DEPT VISIT HI MDM: CPT

## 2024-01-17 RX ORDER — SODIUM CHLORIDE 9 MG/ML
1000 INJECTION INTRAMUSCULAR; INTRAVENOUS; SUBCUTANEOUS ONCE
Refills: 0 | Status: COMPLETED | OUTPATIENT
Start: 2024-01-17 | End: 2024-01-17

## 2024-01-17 RX ORDER — INSULIN LISPRO 100/ML
5 VIAL (ML) SUBCUTANEOUS
Refills: 0 | Status: DISCONTINUED | OUTPATIENT
Start: 2024-01-17 | End: 2024-01-20

## 2024-01-17 RX ORDER — CHOLECALCIFEROL (VITAMIN D3) 125 MCG
1 CAPSULE ORAL
Refills: 0 | DISCHARGE

## 2024-01-17 RX ORDER — CABOZANTINIB 140 MG/DAY
1 KIT ORAL
Refills: 0 | DISCHARGE

## 2024-01-17 RX ORDER — METFORMIN HYDROCHLORIDE 850 MG/1
1 TABLET ORAL
Qty: 0 | Refills: 0 | DISCHARGE

## 2024-01-17 RX ORDER — INSULIN DEGLUDEC 100 U/ML
26 INJECTION, SOLUTION SUBCUTANEOUS
Refills: 0 | DISCHARGE

## 2024-01-17 RX ORDER — CETIRIZINE HYDROCHLORIDE 10 MG/1
1 TABLET ORAL
Refills: 0 | DISCHARGE

## 2024-01-17 RX ORDER — LORATADINE 10 MG/1
1 TABLET ORAL
Qty: 0 | Refills: 0 | DISCHARGE

## 2024-01-17 RX ORDER — FAMOTIDINE 10 MG/ML
1 INJECTION INTRAVENOUS
Refills: 0 | DISCHARGE

## 2024-01-17 RX ORDER — HEPARIN SODIUM 5000 [USP'U]/ML
5000 INJECTION INTRAVENOUS; SUBCUTANEOUS EVERY 12 HOURS
Refills: 0 | Status: DISCONTINUED | OUTPATIENT
Start: 2024-01-17 | End: 2024-01-20

## 2024-01-17 RX ORDER — SODIUM CHLORIDE 9 MG/ML
1000 INJECTION INTRAMUSCULAR; INTRAVENOUS; SUBCUTANEOUS
Refills: 0 | Status: DISCONTINUED | OUTPATIENT
Start: 2024-01-17 | End: 2024-01-17

## 2024-01-17 RX ORDER — CARVEDILOL PHOSPHATE 80 MG/1
12.5 CAPSULE, EXTENDED RELEASE ORAL EVERY 12 HOURS
Refills: 0 | Status: DISCONTINUED | OUTPATIENT
Start: 2024-01-17 | End: 2024-01-20

## 2024-01-17 RX ORDER — LEVOTHYROXINE SODIUM 125 MCG
150 TABLET ORAL DAILY
Refills: 0 | Status: DISCONTINUED | OUTPATIENT
Start: 2024-01-17 | End: 2024-01-20

## 2024-01-17 RX ORDER — ERGOCALCIFEROL 1.25 MG/1
1 CAPSULE ORAL
Qty: 0 | Refills: 0 | DISCHARGE

## 2024-01-17 RX ORDER — AMLODIPINE BESYLATE 2.5 MG/1
10 TABLET ORAL DAILY
Refills: 0 | Status: DISCONTINUED | OUTPATIENT
Start: 2024-01-17 | End: 2024-01-20

## 2024-01-17 RX ORDER — MOMETASONE FUROATE AND FORMOTEROL FUMARATE DIHYDRATE 200; 5 UG/1; UG/1
2 AEROSOL RESPIRATORY (INHALATION)
Qty: 0 | Refills: 0 | DISCHARGE

## 2024-01-17 RX ORDER — GABAPENTIN 400 MG/1
300 CAPSULE ORAL AT BEDTIME
Refills: 0 | Status: DISCONTINUED | OUTPATIENT
Start: 2024-01-17 | End: 2024-01-20

## 2024-01-17 RX ORDER — MONTELUKAST 4 MG/1
1 TABLET, CHEWABLE ORAL
Qty: 0 | Refills: 0 | DISCHARGE

## 2024-01-17 RX ORDER — PREGABALIN 225 MG/1
1000 CAPSULE ORAL DAILY
Refills: 0 | Status: DISCONTINUED | OUTPATIENT
Start: 2024-01-17 | End: 2024-01-20

## 2024-01-17 RX ORDER — ACETAMINOPHEN 500 MG
650 TABLET ORAL ONCE
Refills: 0 | Status: COMPLETED | OUTPATIENT
Start: 2024-01-17 | End: 2024-01-17

## 2024-01-17 RX ORDER — SODIUM CHLORIDE 9 MG/ML
1000 INJECTION, SOLUTION INTRAVENOUS
Refills: 0 | Status: DISCONTINUED | OUTPATIENT
Start: 2024-01-17 | End: 2024-01-18

## 2024-01-17 RX ORDER — NITROFURANTOIN MACROCRYSTAL 50 MG
100 CAPSULE ORAL
Refills: 0 | Status: DISCONTINUED | OUTPATIENT
Start: 2024-01-17 | End: 2024-01-19

## 2024-01-17 RX ORDER — LEVOTHYROXINE SODIUM 125 MCG
1 TABLET ORAL
Qty: 0 | Refills: 0 | DISCHARGE

## 2024-01-17 RX ORDER — MONTELUKAST 4 MG/1
10 TABLET, CHEWABLE ORAL AT BEDTIME
Refills: 0 | Status: DISCONTINUED | OUTPATIENT
Start: 2024-01-17 | End: 2024-01-20

## 2024-01-17 RX ORDER — INSULIN LISPRO 100/ML
VIAL (ML) SUBCUTANEOUS
Refills: 0 | Status: DISCONTINUED | OUTPATIENT
Start: 2024-01-17 | End: 2024-01-20

## 2024-01-17 RX ORDER — ATORVASTATIN CALCIUM 80 MG/1
1 TABLET, FILM COATED ORAL
Qty: 0 | Refills: 0 | DISCHARGE

## 2024-01-17 RX ORDER — ICOSAPENT ETHYL 500 MG/1
2 CAPSULE, LIQUID FILLED ORAL
Refills: 0 | DISCHARGE

## 2024-01-17 RX ORDER — LORATADINE 10 MG/1
10 TABLET ORAL DAILY
Refills: 0 | Status: DISCONTINUED | OUTPATIENT
Start: 2024-01-17 | End: 2024-01-20

## 2024-01-17 RX ORDER — FENOFIBRATE,MICRONIZED 130 MG
1 CAPSULE ORAL
Qty: 0 | Refills: 0 | DISCHARGE

## 2024-01-17 RX ORDER — ATORVASTATIN CALCIUM 80 MG/1
10 TABLET, FILM COATED ORAL AT BEDTIME
Refills: 0 | Status: DISCONTINUED | OUTPATIENT
Start: 2024-01-17 | End: 2024-01-20

## 2024-01-17 RX ADMIN — SODIUM CHLORIDE 1000 MILLILITER(S): 9 INJECTION INTRAMUSCULAR; INTRAVENOUS; SUBCUTANEOUS at 17:02

## 2024-01-17 RX ADMIN — ATORVASTATIN CALCIUM 10 MILLIGRAM(S): 80 TABLET, FILM COATED ORAL at 23:56

## 2024-01-17 RX ADMIN — GABAPENTIN 300 MILLIGRAM(S): 400 CAPSULE ORAL at 23:56

## 2024-01-17 RX ADMIN — MONTELUKAST 10 MILLIGRAM(S): 4 TABLET, CHEWABLE ORAL at 23:56

## 2024-01-17 RX ADMIN — SODIUM CHLORIDE 1000 MILLILITER(S): 9 INJECTION INTRAMUSCULAR; INTRAVENOUS; SUBCUTANEOUS at 14:54

## 2024-01-17 RX ADMIN — Medication 650 MILLIGRAM(S): at 14:54

## 2024-01-17 NOTE — H&P ADULT - ASSESSMENT
Patient is a 62 yo Danish speaking female  from home, living alone with CDPAP program (mohamud Collins is a caregiver 5hrs/ day 5days/wk) with a significant PMHx of HTN, DM and metastatic renal CA (follows with [facility] Dr. Benigno ramos since 2008, mets to breast and pancreas, on chemo x about a year every 2 weeks), presents from outpatient PCP office Dr. Olivera with elevate CK levels and with weakness. Upon evaluation in ED, patient afebrile and hemodynamically stable. Physical examination showing 5/5 strength in upper and lower extremities. Labs significant for hgb 11.6, CK of 1373 and Scr of 2.38. UA noted to be consistent with infection. Patient admitted to medicine for rhabdomyolysis and JAX.

## 2024-01-17 NOTE — ED PROVIDER NOTE - NSICDXPASTSURGICALHX_GEN_ALL_CORE_FT
PAST SURGICAL HISTORY:  H/O bladder repair surgery     History of kidney surgery     S/P hysterectomy

## 2024-01-17 NOTE — H&P ADULT - PROBLEM SELECTOR PLAN 1
- patient presenting with weakness and Elevated CK levels from PCP office  - CK noted to be 1373  - 5/5 strength in upper and lower extremities   - admit to medicine   - LR @100cc for 12 hrs  - trend CK levels   - fall precautions

## 2024-01-17 NOTE — H&P ADULT - NSHPPHYSICALEXAM_GEN_ALL_CORE
PHYSICAL EXAM:  GENERAL: NAD, speaks in full sentences, no signs of respiratory distress  HEAD:  Atraumatic, Normocephalic  EYES: EOMI, PERRLA, conjunctiva and sclera clear  NECK: Supple  CHEST/LUNG: Clear to auscultation bilaterally; No wheeze; No crackles; No accessory muscles used  HEART: Regular rate and rhythm; No murmurs, gallops or rubs   ABDOMEN: Soft, Nontender, Nondistended; Bowel sounds present; No guarding  EXTREMITIES:  2+ Peripheral Pulses, No cyanosis or edema  PSYCH: AAOx3  NEUROLOGY: non-focal, 5/5 strength in B/L upper and lower extremities. Patient able to . Sensation intact b/l.   SKIN: No rashes or lesions

## 2024-01-17 NOTE — H&P ADULT - NSHPREVIEWOFSYSTEMS_GEN_ALL_CORE
CONSTITUTIONAL: Has generalized weakness.  No fever, weight loss, or fatigue  EYES: No eye pain, visual disturbances, or discharge  ENT:  No difficulty hearing, tinnitus, vertigo; No sinus or throat pain  NECK: No pain or stiffness  RESPIRATORY: No cough, wheezing, chills or hemoptysis; No Shortness of Breath  CARDIOVASCULAR: No chest pain, palpitations, passing out, dizziness, or leg swelling  GASTROINTESTINAL: No abdominal or epigastric pain. No nausea, vomiting, or hematemesis; No diarrhea or constipation. No melena or hematochezia.  GENITOURINARY: No dysuria, frequency, hematuria, or incontinence  NEUROLOGICAL: No headaches, memory loss, loss of strength, numbness, or tremors  SKIN: No skin lesions   LYMPH Nodes: No enlarged glands  ENDOCRINE: No heat or cold intolerance; No hair loss  MUSCULOSKELETAL: No joint pain or swelling; No muscle, back, No extremity pain  PSYCHIATRIC: No depression, anxiety, mood swings, or difficulty sleeping  HEME/LYMPH: No easy bruising, or bleeding gums  ALLERGY AND IMMUNOLOGIC: No hives or eczema

## 2024-01-17 NOTE — H&P ADULT - PROBLEM SELECTOR PLAN 3
- patient noted to be on treatment for UTI   - continue with Macrobid as prescribed for 3 more days   - f/u urine culture

## 2024-01-17 NOTE — H&P ADULT - PROBLEM SELECTOR PLAN 5
- noted to be on Valsartan, Amlodipine, Coreg  - continue with amlodipine and coreg  - hold ARB in setting of JAX  - DASH diet

## 2024-01-17 NOTE — ED PROVIDER NOTE - CLINICAL SUMMARY MEDICAL DECISION MAKING FREE TEXT BOX
Quan WELCH: 63 y old Female from home, living alone with CDPAP program (mohamud Collins is a caregiver 5hrs/ day 5days/wk) with a significant PMHx of HTN, DM and metastatic renal CA (since 2008, mets to breast and pancreas, on chemo x about a year every 2 weeks), PSH of Left kidney removal in 2008 and bladder reconstruction in 2008, hysterectomy, Presents sitting to the emergency department with chief complaint of bilateral upper and lower extremity pain, sent in by her primary care doctor for elevated CK.

## 2024-01-17 NOTE — ED PROVIDER NOTE - OBJECTIVE STATEMENT
63 y old Female from home, living alone with CDPAP program (mohamud Collins is a caregiver 5hrs/ day 5days/wk) with a significant PMHx of HTN, DM and metastatic renal CA (since 2008, mets to breast and pancreas, on chemo x about a year every 2 weeks), PSH of Left kidney removal in 2008 and bladder reconstruction in 2008, hysterectomy, now presents to the ER for evaluation of nonproductive cough, fever and chills. Patient's last chemo was 2 weeks ago. 63 y old Female from home, living alone with CDPAP program (mohamud Collins is a caregiver 5hrs/ day 5days/wk) with a significant PMHx of HTN, DM and metastatic renal CA (since 2008, mets to breast and pancreas, on chemo x about a year every 2 weeks), PSH of Left kidney removal in 2008 and bladder reconstruction in 2008, hysterectomy, now presents to the ER for evaluation of nonproductive cough, fever and chills. Patient's last chemo was 2 weeks ago 2020. 63 y old Female from home, living alone with [employer] (mohamud Collins is a caregiver 5hrs/ day 5days/wk) with a significant PMHx of HTN, DM and metastatic renal CA (since 2008, mets to breast and pancreas, on chemo x about a year every 2 weeks), PSH of Left kidney removal in 2008 and bladder reconstruction in 2008, hysterectomy, Presents sitting to the emergency department with chief complaint of bilateral upper and lower extremity pain, patient states that she was sent in by her primary care doctor for elevated CK.   Patient states that she is back to taking chemotherapy, states that she take the chemotherapy pill every day for metastatic renal cancer to the pancreas and breast.   Patient states that she was diagnosed with a urinary tract infection around 1 week ago, and has been taking Macrobid.  She denies any urinary symptoms, and no dark urine or hematuria.  No nausea, vomiting, fevers or chills.  used ID # [id number]    allergies: sulfa  PMD: elio quigley

## 2024-01-17 NOTE — ED ADULT NURSE NOTE - NSFALLRISKASMTTYPE_ED_ALL_ED
[address]  Ph: [phone]     Fax: [phone]    CONTINUED PLAN OF CARE/RECERTIFICATION FOR PHYSICAL THERAPY          Patient Name:              Ana Blue :  2008   Treatment/Medical Diagnosis:  Pain in right knee [M25.561]  Unspecified subluxation of right patella, sequela [S83.001S]   Onset Date:  23    Referral Source:  Mariama Ni MD Start of [facility]):  23   Prior Hospitalization:  See Medical History Provider #:  NPI      Prior Level of Function (PLOF): Independent    Comorbidities:  See chart    Medications:  Verified on Patient Summary List   Visits from [facility]:  12 Missed Visits:  Had a 2 week off      Progress toward Goals:    Re-assessment       GOALS/Progress towards goals:  Patient Goal (s): Chyrl Ficks normally again    [x] Met [] Not met [] Partially met      Short Term Goals: To be accomplished in 8-10 treatments. Pt will be ind with HEP and compliant with PT attendance[x] Met [] Not met [] Partially met 3/14-reporting daily compliance  Pt will have 0 - 120 degrees R knee PROM in preparation for normalized gait pattern. [x] Met [] Not met [] Partially met  3/7 met  Pt will be able to bear full weight through RLE and maintain SLS for 10 sec. [x] Met [] Not met [] Partially met   3/14  Pt will be able to ambulate over level surfaces with normalized gait pattern and no increased pain. [] Met [] Not met [x] Partially met  and   mild difficulty         Long Term Goals: To be accomplished in 25-30 treatments. Pt will be able to navigate a flight of stairs with reciprocal gait pattern and no increased pain. [] Met [x] Not met [] Partially met   and    Pt will be able to jog over level surfaces with normalized gait pattern and no increased pain. [] Met [x] Not met [] Partially met  and    Pt will will be able to maintain SLS on compliant surface for 30 sec. [x] Initial (On Arrival)

## 2024-01-17 NOTE — H&P ADULT - PROBLEM SELECTOR PLAN 2
- noted to have Scr of 2.38, Normal baseline Scr 1.2 (9/20)  - can be 2/2 rhabdomyolysis   - hold ARB in setting of JAX  - hold home Lasix due to JAX, patient euvolemic on exam   - IVF hydration   - f/u Renal US  - f/u urine lytes  - Nephro consult - noted to have Scr of 2.38, Normal baseline Scr 1.2 (9/20)  - can be 2/2 rhabdomyolysis   - hold ARB in setting of JAX  - hold home Lasix due to JAX, patient euvolemic on exam   - IVF hydration   - f/u Renal US  - f/u urine lytes  - Nephro consulted Dr. Wilson

## 2024-01-17 NOTE — ED ADULT TRIAGE NOTE - TEMPERATURE IN CELSIUS (DEGREES C)
decreased ability to use legs for bridging/pushing/decreased ability to use arms for pushing/pulling 36.4

## 2024-01-17 NOTE — H&P ADULT - PROBLEM SELECTOR PLAN 7
- noted to be on Tresiba insulin 26 units BID and Humalog (12,14,16 units before meals)  - continue with 5 units before meals, can titrate upwards  - SSI

## 2024-01-17 NOTE — ED ADULT NURSE NOTE - OBJECTIVE STATEMENT
pt sent in by pmd due to elevated CK. pt is aaox4, speaking in clear and complete sentences. Pt [place] speaking

## 2024-01-17 NOTE — H&P ADULT - HISTORY OF PRESENT ILLNESS
Patient is a 64 yo Lao speaking female  from home, living alone with CDPAP program (mohamud Collins is a caregiver 5hrs/ day 5days/wk) with a significant PMHx of HTN, DM and metastatic renal CA (follows with [facility] Dr. Benigno ramos since 2008, mets to breast and pancreas, on chemo x about a year every 2 weeks), presents from outpatient PCP office Dr. Olivera with elevate CK levels and with weakness. Patient reports that for the last 3 months she has weakness in the upper and lower extremities. Patient states that the weakness is associated with a throbbing pain 6/10 in severity. Weakness is associated with tingling in the left arm. Patient denies any syncope or trauma. Patient reports dizziness worsened with movement for the last 1 week. Patient reports increased urinary frequency and was found to have a UTI and was prescribed Macrobid which she is on Day 8 of a ten day course. Denies flank pain, fevers or chills. Denies chest pain, sob or palpitations. Denies abdominal pain, nausea or vomiting. Patient ambulates with cane.

## 2024-01-17 NOTE — H&P ADULT - NSICDXPASTMEDICALHX_GEN_ALL_CORE_FT
PAST MEDICAL HISTORY:  DM (diabetes mellitus)     HLD (hyperlipidemia)     HTN (hypertension)     Renal cancer

## 2024-01-18 DIAGNOSIS — Z02.9 ENCOUNTER FOR ADMINISTRATIVE EXAMINATIONS, UNSPECIFIED: ICD-10-CM

## 2024-01-18 DIAGNOSIS — E87.8 OTHER DISORDERS OF ELECTROLYTE AND FLUID BALANCE, NOT ELSEWHERE CLASSIFIED: ICD-10-CM

## 2024-01-18 LAB
ALBUMIN SERPL ELPH-MCNC: 2 G/DL — LOW (ref 3.5–5)
ALP SERPL-CCNC: 85 U/L — SIGNIFICANT CHANGE UP (ref 40–120)
ALT FLD-CCNC: 43 U/L DA — SIGNIFICANT CHANGE UP (ref 10–60)
ANION GAP SERPL CALC-SCNC: 5 MMOL/L — SIGNIFICANT CHANGE UP (ref 5–17)
ANION GAP SERPL CALC-SCNC: 6 MMOL/L — SIGNIFICANT CHANGE UP (ref 5–17)
AST SERPL-CCNC: 48 U/L — HIGH (ref 10–40)
BILIRUB SERPL-MCNC: 0.5 MG/DL — SIGNIFICANT CHANGE UP (ref 0.2–1.2)
BUN SERPL-MCNC: 30 MG/DL — HIGH (ref 7–18)
BUN SERPL-MCNC: 33 MG/DL — HIGH (ref 7–18)
CALCIUM SERPL-MCNC: 7.9 MG/DL — LOW (ref 8.4–10.5)
CALCIUM SERPL-MCNC: 8.1 MG/DL — LOW (ref 8.4–10.5)
CHLORIDE SERPL-SCNC: 111 MMOL/L — HIGH (ref 96–108)
CHLORIDE SERPL-SCNC: 112 MMOL/L — HIGH (ref 96–108)
CK SERPL-CCNC: 378 U/L — HIGH (ref 21–215)
CK SERPL-CCNC: 811 U/L — HIGH (ref 21–215)
CO2 SERPL-SCNC: 26 MMOL/L — SIGNIFICANT CHANGE UP (ref 22–31)
CO2 SERPL-SCNC: 27 MMOL/L — SIGNIFICANT CHANGE UP (ref 22–31)
CREAT ?TM UR-MCNC: 79 MG/DL — SIGNIFICANT CHANGE UP
CREAT SERPL-MCNC: 1.86 MG/DL — HIGH (ref 0.5–1.3)
CREAT SERPL-MCNC: 2 MG/DL — HIGH (ref 0.5–1.3)
EGFR: 27 ML/MIN/1.73M2 — LOW
EGFR: 30 ML/MIN/1.73M2 — LOW
GLUCOSE BLDC GLUCOMTR-MCNC: 154 MG/DL — HIGH (ref 70–99)
GLUCOSE BLDC GLUCOMTR-MCNC: 72 MG/DL — SIGNIFICANT CHANGE UP (ref 70–99)
GLUCOSE BLDC GLUCOMTR-MCNC: 88 MG/DL — SIGNIFICANT CHANGE UP (ref 70–99)
GLUCOSE SERPL-MCNC: 114 MG/DL — HIGH (ref 70–99)
GLUCOSE SERPL-MCNC: 77 MG/DL — SIGNIFICANT CHANGE UP (ref 70–99)
HCT VFR BLD CALC: 31.4 % — LOW (ref 34.5–45)
HGB BLD-MCNC: 10 G/DL — LOW (ref 11.5–15.5)
MAGNESIUM SERPL-MCNC: 1.3 MG/DL — LOW (ref 1.6–2.6)
MAGNESIUM SERPL-MCNC: 1.4 MG/DL — LOW (ref 1.6–2.6)
MCHC RBC-ENTMCNC: 30.2 PG — SIGNIFICANT CHANGE UP (ref 27–34)
MCHC RBC-ENTMCNC: 31.8 GM/DL — LOW (ref 32–36)
MCV RBC AUTO: 94.9 FL — SIGNIFICANT CHANGE UP (ref 80–100)
NRBC # BLD: 0 /100 WBCS — SIGNIFICANT CHANGE UP (ref 0–0)
PHOSPHATE SERPL-MCNC: 3.7 MG/DL — SIGNIFICANT CHANGE UP (ref 2.5–4.5)
PHOSPHATE SERPL-MCNC: 3.7 MG/DL — SIGNIFICANT CHANGE UP (ref 2.5–4.5)
PLATELET # BLD AUTO: 232 K/UL — SIGNIFICANT CHANGE UP (ref 150–400)
POTASSIUM SERPL-MCNC: 3.5 MMOL/L — SIGNIFICANT CHANGE UP (ref 3.5–5.3)
POTASSIUM SERPL-MCNC: 4 MMOL/L — SIGNIFICANT CHANGE UP (ref 3.5–5.3)
POTASSIUM SERPL-SCNC: 3.5 MMOL/L — SIGNIFICANT CHANGE UP (ref 3.5–5.3)
POTASSIUM SERPL-SCNC: 4 MMOL/L — SIGNIFICANT CHANGE UP (ref 3.5–5.3)
PROT SERPL-MCNC: 5.7 G/DL — LOW (ref 6–8.3)
RBC # BLD: 3.31 M/UL — LOW (ref 3.8–5.2)
RBC # FLD: 15.3 % — HIGH (ref 10.3–14.5)
SODIUM SERPL-SCNC: 143 MMOL/L — SIGNIFICANT CHANGE UP (ref 135–145)
SODIUM SERPL-SCNC: 144 MMOL/L — SIGNIFICANT CHANGE UP (ref 135–145)
SODIUM UR-SCNC: 37 MMOL/L — SIGNIFICANT CHANGE UP
TSH SERPL-MCNC: 9.26 UU/ML — HIGH (ref 0.34–4.82)
WBC # BLD: 6.63 K/UL — SIGNIFICANT CHANGE UP (ref 3.8–10.5)
WBC # FLD AUTO: 6.63 K/UL — SIGNIFICANT CHANGE UP (ref 3.8–10.5)

## 2024-01-18 PROCEDURE — 76775 US EXAM ABDO BACK WALL LIM: CPT | Mod: 26

## 2024-01-18 RX ORDER — MAGNESIUM SULFATE 500 MG/ML
1 VIAL (ML) INJECTION ONCE
Refills: 0 | Status: COMPLETED | OUTPATIENT
Start: 2024-01-18 | End: 2024-01-18

## 2024-01-18 RX ORDER — SODIUM CHLORIDE 9 MG/ML
1000 INJECTION, SOLUTION INTRAVENOUS
Refills: 0 | Status: DISCONTINUED | OUTPATIENT
Start: 2024-01-18 | End: 2024-01-20

## 2024-01-18 RX ADMIN — HEPARIN SODIUM 5000 UNIT(S): 5000 INJECTION INTRAVENOUS; SUBCUTANEOUS at 05:36

## 2024-01-18 RX ADMIN — AMLODIPINE BESYLATE 10 MILLIGRAM(S): 2.5 TABLET ORAL at 05:36

## 2024-01-18 RX ADMIN — CARVEDILOL PHOSPHATE 12.5 MILLIGRAM(S): 80 CAPSULE, EXTENDED RELEASE ORAL at 17:33

## 2024-01-18 RX ADMIN — Medication 150 MICROGRAM(S): at 05:36

## 2024-01-18 RX ADMIN — HEPARIN SODIUM 5000 UNIT(S): 5000 INJECTION INTRAVENOUS; SUBCUTANEOUS at 17:31

## 2024-01-18 RX ADMIN — GABAPENTIN 300 MILLIGRAM(S): 400 CAPSULE ORAL at 22:12

## 2024-01-18 RX ADMIN — CARVEDILOL PHOSPHATE 12.5 MILLIGRAM(S): 80 CAPSULE, EXTENDED RELEASE ORAL at 05:36

## 2024-01-18 RX ADMIN — MONTELUKAST 10 MILLIGRAM(S): 4 TABLET, CHEWABLE ORAL at 22:12

## 2024-01-18 RX ADMIN — Medication 100 GRAM(S): at 14:21

## 2024-01-18 RX ADMIN — Medication 100 MILLIGRAM(S): at 05:36

## 2024-01-18 RX ADMIN — Medication 100 MILLIGRAM(S): at 17:30

## 2024-01-18 RX ADMIN — SODIUM CHLORIDE 100 MILLILITER(S): 9 INJECTION, SOLUTION INTRAVENOUS at 08:33

## 2024-01-18 RX ADMIN — PREGABALIN 1000 MICROGRAM(S): 225 CAPSULE ORAL at 13:35

## 2024-01-18 RX ADMIN — SODIUM CHLORIDE 100 MILLILITER(S): 9 INJECTION, SOLUTION INTRAVENOUS at 00:58

## 2024-01-18 RX ADMIN — LORATADINE 10 MILLIGRAM(S): 10 TABLET ORAL at 13:35

## 2024-01-18 NOTE — PROGRESS NOTE ADULT - PROBLEM SELECTOR PLAN 8
Continue home dose synthroid Controlled  Patient takes Tresiba 26U BID, Lispro 12U QBreakfast, 14U QLunch and 16U QDinner  Standing dose 5U TIDAC  Sliding scale insulin coverage  Glucose monitoring  Low carb diet   Anticipate increasing regimen as Tresiba metabolizes

## 2024-01-18 NOTE — PROGRESS NOTE ADULT - PROBLEM SELECTOR PLAN 7
Controlled  Patient takes Tresiba 26U BID, Lispro 12U QBreakfast, 14U QLunch and 16U QDinner  Standing dose 5U TIDAC  Sliding scale insulin coverage  Glucose monitoring  Low carb diet   Anticipate increasing regimen as Tresiba metabolizes Continue with home dose statin

## 2024-01-18 NOTE — PROGRESS NOTE ADULT - PROBLEM SELECTOR PLAN 6
Continue with home dose statin Controlled  Takes Amlodipine 10 mg QD, Carvedilol 12.5 mg BID, Valsartan 30 mg QD, and Lasix 40 mg QD at home  Continue home regimen CCB and BB  Hold ARB and diuretic ISO JAX  DASH diet

## 2024-01-18 NOTE — CONSULT NOTE ADULT - SUBJECTIVE AND OBJECTIVE BOX
Reason for Admission: Mild rhabdo, JAX  History of Present Illness:   Patient is a 64 yo Portuguese speaking female  from home, living alone with CDPAP program (mohamud Collins is a caregiver 5hrs/ day 5days/wk) with a significant PMHx of HTN, DM and metastatic renal CA (follows with [facility] Dr. Benigno ramos since 2008, mets to breast and pancreas, on chemo x about a year every 2 weeks), presents from outpatient PCP office Dr. Olivera with elevate CK levels and with weakness. Patient reports that for the last 3 months she has weakness in the upper and lower extremities. Patient states that the weakness is associated with a throbbing pain 6/10 in severity. Weakness is associated with tingling in the left arm. Patient denies any syncope or trauma. Patient reports dizziness worsened with movement for the last 1 week. Patient reports increased urinary frequency and was found to have a UTI and was prescribed Macrobid which she is on Day 8 of a ten day course. Denies flank pain, fevers or chills. Denies chest pain, sob or palpitations. Denies abdominal pain, nausea or vomiting. Patient ambulates with cane.     REVIEW OF SYSTEMS:    CONSTITUTIONAL: No fever, no loss of appetite. no chills, no weight loss   EYES: no acute visual disturbances  NECK: No pain or stiffness  RESPIRATORY: No cough; No shortness of breath  CARDIOVASCULAR: No chest pain, no palpitations  GASTROINTESTINAL: No pain. No nausea or vomiting; + diarrhea   NEUROLOGICAL: No headache or numbness, no tremors  MUSCULOSKELETAL: No joint pain, no muscle pain  GENITOURINARY: no dysuria, no frequency, no hesitancy  PSYCHIATRY: no depression, no anxiety  ALL OTHER  ROS negative    Allergies and Intolerances:        Allergies:  	sulfa drugs: Drug Category, Hives    Home Medications:   * Patient Currently Takes Medications as of 17-Jan-2024 18:02 documented in Structured Notes  · 	Tresiba 100 units/mL subcutaneous solution: 26 unit(s) subcutaneous 2 times a day  · 	Cabometyx 60 mg oral tablet: Last Dose Taken:  , 1 tab(s) orally once a day  · 	Vascepa 1 g oral capsule: 2 cap(s) orally 2 times a day  · 	HumaLOG 100 units/mL injectable solution: 12 unit(s) injectable once a day before breakfast  · 	montelukast 10 mg oral tablet: 1 tab(s) orally once a day (at bedtime)  · 	valsartan 320 mg oral tablet: 1 tab(s) orally once a day  · 	atorvastatin 10 mg oral tablet: Last Dose Taken:  , 1 tab(s) orally once a day (at bedtime)  · 	Coreg 12.5 mg oral tablet: 1 tab(s) orally 2 times a day  · 	cetirizine 10 mg oral tablet: Last Dose Taken:  , 1 tab(s) orally once a day  · 	gabapentin 300 mg oral capsule: 1 cap(s) orally once a day (at bedtime)  · 	cholecalciferol 1250 mcg (50,000 intl units) oral capsule: Last Dose Taken:  , 1 cap(s) orally every 7 days  · 	amLODIPine 10 mg oral tablet: Last Dose Taken:  , 1 tab(s) orally once a day  · 	cyanocobalamin 1000 mcg oral tablet: 1 tab(s) orally once a day  · 	levothyroxine 150 mcg (0.15 mg) oral capsule: 1 cap(s) orally once a day  · 	famotidine 20 mg oral tablet: 1 tab(s) orally once a day  · 	Macrobid 100 mg oral capsule: 1 cap(s) orally 2 times a day  · 	Lasix 40 mg oral tablet: 1 tab(s) orally once a day  · 	HumaLOG Pen 100 units/mL subcutaneous solution: 14 unit(s) subcutaneous once a day before lunch  · 	insulin lispro 100 units/mL injectable solution: 16 unit(s) injectable once a day before dinner    Patient History:   Past Medical, Past Surgical, and Family History:  PAST MEDICAL HISTORY:  DM (diabetes mellitus)     HLD (hyperlipidemia)     HTN (hypertension)     Renal cancer.     PAST SURGICAL HISTORY:  H/O bladder repair surgery     History of kidney surgery     S/P hysterectomy.     FAMILY HISTORY:  No pertinent family history in first degree relatives. No pertinent family history of: asthma.    Social History:  · Substance use	No  · Social History (marital status, living situation, occupation, and sexual history)	Patient denies illicit drug, ETOH or tobacco use.    Tobacco Screening:  · Core Measure Site	Yes  · Has the patient used tobacco in the past 30 days?	No    Risk Assessment:   Present on Admission:  Deep Venous Thrombosis	no  Pulmonary Embolus	no      MEDICATIONS  (STANDING):  amLODIPine   Tablet 10 milliGRAM(s) Oral daily  atorvastatin 10 milliGRAM(s) Oral at bedtime  carvedilol 12.5 milliGRAM(s) Oral every 12 hours  cyanocobalamin 1000 MICROGram(s) Oral daily  gabapentin 300 milliGRAM(s) Oral at bedtime  heparin   Injectable 5000 Unit(s) SubCutaneous every 12 hours  insulin lispro (ADMELOG) corrective regimen sliding scale   SubCutaneous three times a day before meals  insulin lispro Injectable (ADMELOG) 5 Unit(s) SubCutaneous three times a day before meals  lactated ringers. 1000 milliLiter(s) (100 mL/Hr) IV Continuous <Continuous>  levothyroxine 150 MICROGram(s) Oral daily  loratadine 10 milliGRAM(s) Oral daily  montelukast 10 milliGRAM(s) Oral at bedtime  nitrofurantoin monohydrate/macrocrystals (MACROBID) 100 milliGRAM(s) Oral two times a day    MEDICATIONS  (PRN):    CAPILLARY BLOOD GLUCOSE      POCT Blood Glucose.: 154 mg/dL (18 Jan 2024 17:26)  POCT Blood Glucose.: 88 mg/dL (18 Jan 2024 11:24)  POCT Blood Glucose.: 72 mg/dL (18 Jan 2024 07:46)    I&O's Summary      PHYSICAL EXAM:  Vital Signs Last 24 Hrs  T(C): 36.3 (18 Jan 2024 12:46), Max: 36.6 (18 Jan 2024 01:08)  T(F): 97.4 (18 Jan 2024 12:46), Max: 97.8 (18 Jan 2024 01:08)  HR: 76 (18 Jan 2024 12:46) (72 - 81)  BP: 137/78 (18 Jan 2024 12:46) (136/76 - 140/72)  BP(mean): --  RR: 17 (18 Jan 2024 12:46) (16 - 17)  SpO2: 97% (18 Jan 2024 12:46) (96% - 98%)    Parameters below as of 18 Jan 2024 12:46  Patient On (Oxygen Delivery Method): room air      GEN: NAD; A and O x 3  LUNGS: CTA B/L  HEART: S1 S2  ABDOMEN: soft, non-tender, non-distended, + BS  EXTREMITIES: no edema      LABS:                        10.0   6.63  )-----------( 232      ( 18 Jan 2024 04:30 )             31.4     01-18    143  |  111<H>  |  30<H>  ----------------------------<  114<H>  4.0   |  27  |  1.86<H>    Ca    8.1<L>      18 Jan 2024 12:15  Phos  3.7     01-18  Mg     1.4     01-18    TPro  5.7<L>  /  Alb  2.0<L>  /  TBili  0.5  /  DBili  x   /  AST  48<H>  /  ALT  43  /  AlkPhos  85  01-18      CARDIAC MARKERS ( 18 Jan 2024 16:20 )  x     / x     / 378 U/L / x     / x      CARDIAC MARKERS ( 18 Jan 2024 04:30 )  x     / x     / 811 U/L / x     / x      CARDIAC MARKERS ( 17 Jan 2024 14:47 )  x     / x     / 1373 U/L / x     / x          Urinalysis Basic - ( 18 Jan 2024 12:15 )    Color: x / Appearance: x / SG: x / pH: x  Gluc: 114 mg/dL / Ketone: x  / Bili: x / Urobili: x   Blood: x / Protein: x / Nitrite: x   Leuk Esterase: x / RBC: x / WBC x   Sq Epi: x / Non Sq Epi: x / Bacteria: x            RADIOLOGY & ADDITIONAL TESTS:    < from: US Renal (01.18.24 @ 10:06) >    ACC: [id number] EXAM:  US KIDNEY(S)   ORDERED BY: SABA SIERRA     PROCEDURE DATE:  01/18/2024          INTERPRETATION:  CLINICAL INFORMATION: Acute kidney injury.    COMPARISON: CT chest, abdomen pelvis from 09/21/2020.    TECHNIQUE: Sonography of the kidneys and bladder.    FINDINGS:  Right kidney: 11.9 cm. No renal mass, hydronephrosis or calculi.    Left kidney: Status post nephrectomy.    Urinary bladder: Incompletely distended.    IMPRESSION:  Left nephrectomy.  No right renal mass, hydronephrosis or calculus is   visualized sonographically.    < end of copied text >

## 2024-01-18 NOTE — PROGRESS NOTE ADULT - PROBLEM SELECTOR PLAN 4
Follows with [facility] Dr. Benigno [facility]S consulted Started ABX as outpatient  Continue Macrobid to complete course  Urine culture pending

## 2024-01-18 NOTE — PROGRESS NOTE ADULT - PROBLEM SELECTOR PLAN 5
Controlled  Takes Amlodipine 10 mg QD, Carvedilol 12.5 mg BID, Valsartan 30 mg QD, and Lasix 40 mg QD at home  Continue home regimen CCB and BB  Hold ARB and diuretic ISO JAX  DASH diet Follows with [facility] Dr. Benigno MIMS consulted

## 2024-01-18 NOTE — CONSULT NOTE ADULT - SUBJECTIVE AND OBJECTIVE BOX
Chief complain/HPI  Patient is a 64 yo Slovenian speaking female  from home, living alone with CDPAP program (mohamud Collins is a caregiver 5hrs/ day 5days/wk) with a significant PMHx of HTN, DM and metastatic renal CA (follows with [facility] Dr. Benigno ramos since 2008, mets to breast and pancreas, on chemo x about a year every 2 weeks), presents from outpatient PCP office Dr. Olivera with elevate CK levels and with weakness. Patient reports that for the last 3 months she has weakness in the upper and lower extremities. Patient states that the weakness is associated with a throbbing pain 6/10 in severity. Weakness is associated with tingling in the left arm. Patient denies any syncope or trauma. Patient reports dizziness worsened with movement for the last 1 week. Patient reports increased urinary frequency and was found to have a UTI and was prescribed Macrobid which she is on Day 8 of a ten day course. Denies flank pain, fevers or chills. Denies chest pain, sob or palpitations. Denies abdominal pain, nausea or vomiting. Patient ambulates with cane.   * Patient Currently Takes Medications as of 17-Jan-2024 18:02 documented in Structured Notes  · 	Tresiba 100 units/mL subcutaneous solution: 26 unit(s) subcutaneous 2 times a day  · 	Cabometyx 60 mg oral tablet: Last Dose Taken:  , 1 tab(s) orally once a day  · 	Vascepa 1 g oral capsule: 2 cap(s) orally 2 times a day  · 	HumaLOG 100 units/mL injectable solution: 12 unit(s) injectable once a day before breakfast  · 	montelukast 10 mg oral tablet: 1 tab(s) orally once a day (at bedtime)  · 	valsartan 320 mg oral tablet: 1 tab(s) orally once a day  · 	atorvastatin 10 mg oral tablet: Last Dose Taken:  , 1 tab(s) orally once a day (at bedtime)  · 	Coreg 12.5 mg oral tablet: 1 tab(s) orally 2 times a day  · 	cetirizine 10 mg oral tablet: Last Dose Taken:  , 1 tab(s) orally once a day  · 	gabapentin 300 mg oral capsule: 1 cap(s) orally once a day (at bedtime)  · 	cholecalciferol 1250 mcg (50,000 intl units) oral capsule: Last Dose Taken:  , 1 cap(s) orally every 7 days	amLODIPine 10 mg oral tablet: Last Dose Taken:  , 1 tab(s) orally once a day  · 	cyanocobalamin 1000 mcg oral tablet: 1 tab(s) orally once a day  · 	levothyroxine 150 mcg (0.15 mg) oral capsule: 1 cap(s) orally once a day  · 	famotidine 20 mg oral tablet: 1 tab(s) orally once a day  · 	Macrobid 100 mg oral capsule: 1 cap(s) orally 2 times a day  · 	Lasix 40 mg oral tablet: 1 tab(s) orally once a day  · 	HumaLOG Pen 100 units/mL subcutaneous solution: 14 unit(s) subcutaneous once a day before lunch  · 	insulin lispro 100 units/mL injectable solution: 16 unit(s) injectable once a day before dinner      PAST MEDICAL & SURGICAL HISTORY:  HTN (hypertension)      DM (diabetes mellitus)      Renal cancer      HLD (hyperlipidemia)      S/P hysterectomy      History of kidney surgery      H/O bladder repair surgery          Home Medications Reviewed    Hospital Medications:   MEDICATIONS  (STANDING):  amLODIPine   Tablet 10 milliGRAM(s) Oral daily  atorvastatin 10 milliGRAM(s) Oral at bedtime  carvedilol 12.5 milliGRAM(s) Oral every 12 hours  cyanocobalamin 1000 MICROGram(s) Oral daily  gabapentin 300 milliGRAM(s) Oral at bedtime  heparin   Injectable 5000 Unit(s) SubCutaneous every 12 hours  insulin lispro (ADMELOG) corrective regimen sliding scale   SubCutaneous three times a day before meals  insulin lispro Injectable (ADMELOG) 5 Unit(s) SubCutaneous three times a day before meals  lactated ringers. 1000 milliLiter(s) (100 mL/Hr) IV Continuous <Continuous>  levothyroxine 150 MICROGram(s) Oral daily  loratadine 10 milliGRAM(s) Oral daily  montelukast 10 milliGRAM(s) Oral at bedtime  nitrofurantoin monohydrate/macrocrystals (MACROBID) 100 milliGRAM(s) Oral two times a day    MEDICATIONS  (PRN):      Allergies    sulfa drugs (Hives)    Intolerances                              10.0   6.63  )-----------( 232      ( 18 Jan 2024 04:30 )             31.4     01-18    143  |  111<H>  |  30<H>  ----------------------------<  114<H>  4.0   |  27  |  1.86<H>    Ca    8.1<L>      18 Jan 2024 12:15  Phos  3.7     01-18  Mg     1.4     01-18    TPro  5.7<L>  /  Alb  2.0<L>  /  TBili  0.5  /  DBili  x   /  AST  48<H>  /  ALT  43  /  AlkPhos  85  01-18      Urinalysis Basic - ( 18 Jan 2024 12:15 )    Color: x / Appearance: x / SG: x / pH: x  Gluc: 114 mg/dL / Ketone: x  / Bili: x / Urobili: x   Blood: x / Protein: x / Nitrite: x   Leuk Esterase: x / RBC: x / WBC x   Sq Epi: x / Non Sq Epi: x / Bacteria: x      Sodium, Random Urine: 37 mmol/L (01-18 @ 08:29)  Creatinine, Random Urine: 79 mg/dL (01-18 @ 08:29)        RADIOLOGY & ADDITIONAL STUDIES:    SOCIAL HISTORY: Denies ETOh,Smoking,     FAMILY HISTORY:  No pertinent family history in first degree relatives        REVIEW OF SYSTEMS:  CONSTITUTIONAL: No malaise, No fatigue, No fevers or chills, well developed, no diaphoresis  EYES/ENT: No visual changes;  No vertigo or throat pain   NECK: No pain or stiffness  RESPIRATORY: No cough, wheezing, hemoptysis; No shortness of breath  CARDIOVASCULAR: No chest pain or palpitations. No edema  GASTROINTESTINAL: No abdominal or epigastric pain. No nausea, vomiting, or hematemesis; No diarrhea or constipation. No melena or hematochezia.  GENITOURINARY: No dysuria, frequency, foamy urine, urinary urgency, incontinence or hematuria  NEUROLOGICAL: No numbness or weakness, No tremor  SKIN: No itching, burning, rashes, or lesions   VASCULAR: No claudication  Musculoskeletal: no arthralgia, no myalgia  All other review of systems is negative unless indicated above.    VITALS:  Vital Signs Last 24 Hrs  T(C): 36.3 (18 Jan 2024 12:46), Max: 36.6 (17 Jan 2024 15:35)  T(F): 97.4 (18 Jan 2024 12:46), Max: 97.9 (17 Jan 2024 15:35)  HR: 76 (18 Jan 2024 12:46) (72 - 81)  BP: 137/78 (18 Jan 2024 12:46) (128/74 - 140/72)  BP(mean): --  RR: 17 (18 Jan 2024 12:46) (16 - 17)  SpO2: 97% (18 Jan 2024 12:46) (96% - 98%)    Parameters below as of 18 Jan 2024 12:46  Patient On (Oxygen Delivery Method): room air            PHYSICAL EXAM:  Constitutional: NAD  HEENT: anicteric sclera, oropharynx clear, MMM  Neck: No JVD  Respiratory: good air entrance B/L, no wheezes, rales or rhonchi  Cardiovascular: S1, S2, RRR, no pericardial rub, no murmur  Gastrointestinal: BS+, soft, no tenderness, no distension, no bruit  Pelvis: bladder non-distended, no CVA tenderness  Extremities: No cyanosis or clubbing. No peripheral edema  Neurological: A/O x 3, no focal deficits  Psychiatric: Normal mood, normal affect  : No CVA tenderness. No santos.   Skin: No rashes  Vascular: all pulses present  Access:                     Chief complain/HPI  Patient is a 62 yo [place]inamese speaking female  from home, metastatic renal cancer dx since 2008, mets to breast and pancreas, on chemo x about a year every 2 weeks), presents from outpatient PCP office Dr. Olivera with elevate CK levels and with weakness.  Admitted for UTI     Patient is not aware of her renal function, was never told of it.   History of fluid retention on diuretics.  Creatine Kinase, Serum: 1373 U/L (01.17.24 @ 14:47) Creatine Kinase, Serum: 811 U/L (01.18.24 @ 04:30)   Sodium: 142 mmol/L  Potassium: 4.1 mmol/L  Chloride: 107 mmol/L  Carbon Dioxide: 31 mmol/L  Anion Gap: 4 mmol/L  Blood Urea Nitrogen: 36 mg/dL  Creatinine: 2.38 mg/dL  Glucose: 155 mg/dL  Calcium: 8.8 mg/dL  Protein Total: 6.9 g/dL  Albumin: 2.4 g/dL  eGFR: 22: The estimated glomerular filtration rate (eGFR) is calculated using the eGFR if African American: 54 mL/min/1.73M2 (09.26.20 @ 06:36)   < from: US Renal (01.18.24 @ 10:06) >  COMPARISON: CT chest, abdomen pelvis from 09/21/2020.    TECHNIQUE: Sonography of the kidneys and bladder.    FINDINGS:  Right kidney: 11.9 cm. No renal mass, hydronephrosis or calculi.    Left kidney: Status post nephrectomy.    Urinary bladder: Incompletely distended.    IMPRESSION:  Left nephrectomy.  No right renal mass, hydronephrosis or calculus is   visualized sonographically.    Urinalysis + Microscopic Examination (01.17.24 @ 14:47)   pH Urine: 6.0  Urine Appearance: Clear  Color: Yellow  Specific Gravity: 1.009  Protein, Urine: 100 mg/dL  Glucose Qualitative, Urine: 250 mg/dL  Ketone - Urine: Negative mg/dL  Blood, Urine: Small  Bilirubin: Negative  Urobilinogen: 0.2 mg/dL  Leukocyte Esterase Concentration: Trace  Nitrite: Negative  White Blood Cell - Urine: 8 /HPF  Red Blood Cell - Urine: 15 /HPF  Bacteria: Moderate /HPF  Hyaline Casts: Present  Squamous Epithelial Cells: Present          * Patient Currently Takes Medications as of 17-Jan-2024 18:02 documented in Structured Notes  · 	Tresiba 100 units/mL subcutaneous solution: 26 unit(s) subcutaneous 2 times a day  · 	Cabometyx 60 mg oral tablet: Last Dose Taken:  , 1 tab(s) orally once a day  · 	Vascepa 1 g oral capsule: 2 cap(s) orally 2 times a day  · 	HumaLOG 100 units/mL injectable solution: 12 unit(s) injectable once a day before breakfast  · 	montelukast 10 mg oral tablet: 1 tab(s) orally once a day (at bedtime)  · 	valsartan 320 mg oral tablet: 1 tab(s) orally once a day  · 	atorvastatin 10 mg oral tablet: Last Dose Taken:  , 1 tab(s) orally once a day (at bedtime)  · 	Coreg 12.5 mg oral tablet: 1 tab(s) orally 2 times a day  · 	cetirizine 10 mg oral tablet: Last Dose Taken:  , 1 tab(s) orally once a day  · 	gabapentin 300 mg oral capsule: 1 cap(s) orally once a day (at bedtime)  · 	cholecalciferol 1250 mcg (50,000 intl units) oral capsule: Last Dose Taken:  , 1 cap(s) orally every 7 days	amLODIPine 10 mg oral tablet: Last Dose Taken:  , 1 tab(s) orally once a day  · 	cyanocobalamin 1000 mcg oral tablet: 1 tab(s) orally once a day  · 	levothyroxine 150 mcg (0.15 mg) oral capsule: 1 cap(s) orally once a day  · 	famotidine 20 mg oral tablet: 1 tab(s) orally once a day  · 	Macrobid 100 mg oral capsule: 1 cap(s) orally 2 times a day  · 	Lasix 40 mg oral tablet: 1 tab(s) orally once a day  · 	HumaLOG Pen 100 units/mL subcutaneous solution: 14 unit(s) subcutaneous once a day before lunch  · 	insulin lispro 100 units/mL injectable solution: 16 unit(s) injectable once a day before dinner      PAST MEDICAL & SURGICAL HISTORY:  HTN (hypertension)  DM (diabetes mellitus)      Renal cancer      HLD (hyperlipidemia)      S/P hysterectomy      History of kidney surgery      H/O bladder repair surgery              Hospital Medications:   MEDICATIONS  (STANDING):  amLODIPine   Tablet 10 milliGRAM(s) Oral daily  atorvastatin 10 milliGRAM(s) Oral at bedtime  carvedilol 12.5 milliGRAM(s) Oral every 12 hours  cyanocobalamin 1000 MICROGram(s) Oral daily  gabapentin 300 milliGRAM(s) Oral at bedtime  heparin   Injectable 5000 Unit(s) SubCutaneous every 12 hours  insulin lispro (ADMELOG) corrective regimen sliding scale   SubCutaneous three times a day before meals  insulin lispro Injectable (ADMELOG) 5 Unit(s) SubCutaneous three times a day before meals  lactated ringers. 1000 milliLiter(s) (100 mL/Hr) IV Continuous <Continuous>  levothyroxine 150 MICROGram(s) Oral daily  loratadine 10 milliGRAM(s) Oral daily  montelukast 10 milliGRAM(s) Oral at bedtime  nitrofurantoin monohydrate/macrocrystals (MACROBID) 100 milliGRAM(s) Oral two times a day    MEDICATIONS  (PRN):      Allergies    sulfa drugs (Hives)    Intolerances                              10.0   6.63  )-----------( 232      ( 18 Jan 2024 04:30 )             31.4     01-18    143  |  111<H>  |  30<H>  ----------------------------<  114<H>  4.0   |  27  |  1.86<H>  eGFR: 30: The estimated glomerular filtration rate (eGFR) is calculated using the   Ca    8.1<L>      18 Jan 2024 12:15  Phos  3.7     01-18  Mg     1.4     01-18    TPro  5.7<L>  /  Alb  2.0<L>  /  TBili  0.5  /  DBili  x   /  AST  48<H>  /  ALT  43  /  AlkPhos  85  01-18      Urinalysis + Microscopic Examination (01.17.24 @ 14:47)   pH Urine: 6.0  Urine Appearance: Clear  Color: Yellow  Specific Gravity: 1.009  Protein, Urine: 100 mg/dL  Glucose Qualitative, Urine: 250 mg/dL  Ketone - Urine: Negative mg/dL  Blood, Urine: Small  Bilirubin: Negative  Urobilinogen: 0.2 mg/dL  Leukocyte Esterase Concentration: Trace  Nitrite: Negative  White Blood Cell - Urine: 8 /HPF  Red Blood Cell - Urine: 15 /HPF  Bacteria: Moderate /HPF  Hyaline Casts: Present  Squamous Epithelial Cells: Present      RADIOLOGY & ADDITIONAL STUDIES:    SOCIAL HISTORY: Denies ETOh,Smoking,     FAMILY HISTORY:  No pertinent family history in first degree relatives        REVIEW OF SYSTEMS:  CONSTITUTIONAL: No malaise, No fatigue, No fevers or chills, well developed, no diaphoresis  c/o shoulder pain chronic  RESPIRATORY: No cough, wheezing, hemoptysis; No shortness of breath  CARDIOVASCULAR: No chest pain or palpitations. No edema  GASTROINTESTINAL: No abdominal or epigastric pain. No nausea, vomiting, or hematemesis; No diarrhea or constipation. No melena or hematochezia.  GENITOURINARY: No dysuria, c/o abdominal pain side to side in mid abdomen last few days. No flank pain  NEUROLOGICAL: No numbness or weakness, No tremor  SKIN: No itching, burning, rashes, or lesions       VITALS:  Vital Signs Last 24 Hrs  T(C): 36.3 (18 Jan 2024 12:46), Max: 36.6 (17 Jan 2024 15:35)  T(F): 97.4 (18 Jan 2024 12:46), Max: 97.9 (17 Jan 2024 15:35)  HR: 76 (18 Jan 2024 12:46) (72 - 81)  BP: 137/78 (18 Jan 2024 12:46) (128/74 - 140/72)  BP(mean): --  RR: 17 (18 Jan 2024 12:46) (16 - 17)  SpO2: 97% (18 Jan 2024 12:46) (96% - 98%)    Parameters below as of 18 Jan 2024 12:46  Patient On (Oxygen Delivery Method): room air            PHYSICAL EXAM:  Constitutional: NAD  HEENT: anicteric sclera, oropharynx clear, MMM  Neck: No JVD  Respiratory: air entrance B/L, no wheezes, rales or rhonchi  Cardiovascular: S1, S2, RRR, no pericardial rub, no murmur  Gastrointestinal: BS+, soft, no tenderness, no distension, no bruit  Pelvis: bladder non-distended, no CVA tenderness  Extremities: No cyanosis or clubbing. No peripheral edema

## 2024-01-18 NOTE — CONSULT NOTE ADULT - SUBJECTIVE AND OBJECTIVE BOX
Patient is a 63y old  Female who presents with a chief complaint of Mild rhabdo, JAX (17 Jan 2024 18:28)    History of Present Illness:  Reason for Admission: Mild rhabdo, JAX  History of Present Illness:   Patient is a 64 yo Croatian speaking female  from home, living alone with CDPAP program (mohamud Collins is a caregiver 5hrs/ day 5days/wk) with a significant PMHx of HTN, DM and metastatic renal CA (follows with [facility] Dr. Benigno ramos since 2008, mets to breast and pancreas, on chemo x about a year every 2 weeks), presents from outpatient PCP office Dr. Olivera with elevate CK levels and with weakness. Patient reports that for the last 3 months she has weakness in the upper and lower extremities. Patient states that the weakness is associated with a throbbing pain 6/10 in severity. Weakness is associated with tingling in the left arm. Patient denies any syncope or trauma. Patient reports dizziness worsened with movement for the last 1 week. Patient reports increased urinary frequency and was found to have a UTI and was prescribed Macrobid which she is on Day 8 of a ten day course. Denies flank pain, fevers or chills. Denies chest pain, sob or palpitations. Denies abdominal pain, nausea or vomiting. Patient ambulates with cane.     Awake, alert and lying in bed in NAD.    INTERVAL HPI/OVERNIGHT EVENTS:  T(C): 36.5 (01-18-24 @ 05:25), Max: 36.6 (01-17-24 @ 15:35)  HR: 72 (01-18-24 @ 05:25) (72 - 81)  BP: 136/76 (01-18-24 @ 05:25) (128/74 - 157/86)  RR: 16 (01-18-24 @ 05:25) (16 - 17)  SpO2: 96% (01-18-24 @ 05:25) (95% - 98%)  Wt(kg): --  I&O's Summary      PAST MEDICAL & SURGICAL HISTORY:  HTN (hypertension)      DM (diabetes mellitus)      Renal cancer      HLD (hyperlipidemia)      S/P hysterectomy      History of kidney surgery      H/O bladder repair surgery          SOCIAL HISTORY  Alcohol:  Tobacco:  Illicit substance use:      FAMILY HISTORY:      LABS:                        10.0   6.63  )-----------( 232      ( 18 Jan 2024 04:30 )             31.4     01-18    144  |  112<H>  |  33<H>  ----------------------------<  77  3.5   |  26  |  2.00<H>    Ca    7.9<L>      18 Jan 2024 04:30  Phos  3.7     01-18  Mg     1.3     01-18    TPro  5.7<L>  /  Alb  2.0<L>  /  TBili  0.5  /  DBili  x   /  AST  48<H>  /  ALT  43  /  AlkPhos  85  01-18      Urinalysis Basic - ( 18 Jan 2024 04:30 )    Color: x / Appearance: x / SG: x / pH: x  Gluc: 77 mg/dL / Ketone: x  / Bili: x / Urobili: x   Blood: x / Protein: x / Nitrite: x   Leuk Esterase: x / RBC: x / WBC x   Sq Epi: x / Non Sq Epi: x / Bacteria: x      CAPILLARY BLOOD GLUCOSE            Urinalysis Basic - ( 18 Jan 2024 04:30 )    Color: x / Appearance: x / SG: x / pH: x  Gluc: 77 mg/dL / Ketone: x  / Bili: x / Urobili: x   Blood: x / Protein: x / Nitrite: x   Leuk Esterase: x / RBC: x / WBC x   Sq Epi: x / Non Sq Epi: x / Bacteria: x        MEDICATIONS  (STANDING):  amLODIPine   Tablet 10 milliGRAM(s) Oral daily  atorvastatin 10 milliGRAM(s) Oral at bedtime  carvedilol 12.5 milliGRAM(s) Oral every 12 hours  cyanocobalamin 1000 MICROGram(s) Oral daily  gabapentin 300 milliGRAM(s) Oral at bedtime  heparin   Injectable 5000 Unit(s) SubCutaneous every 12 hours  insulin lispro (ADMELOG) corrective regimen sliding scale   SubCutaneous three times a day before meals  insulin lispro Injectable (ADMELOG) 5 Unit(s) SubCutaneous three times a day before meals  lactated ringers. 1000 milliLiter(s) (100 mL/Hr) IV Continuous <Continuous>  levothyroxine 150 MICROGram(s) Oral daily  loratadine 10 milliGRAM(s) Oral daily  montelukast 10 milliGRAM(s) Oral at bedtime  nitrofurantoin monohydrate/macrocrystals (MACROBID) 100 milliGRAM(s) Oral two times a day    MEDICATIONS  (PRN):      REVIEW OF SYSTEMS:  CONSTITUTIONAL: No fever, weight loss, or fatigue  EYES: No eye pain, visual disturbances, or discharge  ENMT:  No difficulty hearing, tinnitus, vertigo; No sinus or throat pain  NECK: No pain or stiffness  RESPIRATORY: No cough, wheezing, chills or hemoptysis; No shortness of breath  CARDIOVASCULAR: No chest pain, palpitations, dizziness, or leg swelling  GASTROINTESTINAL: No abdominal or epigastric pain. No nausea, vomiting, or hematemesis; No diarrhea or constipation. No melena or hematochezia.  GENITOURINARY: No dysuria, frequency, hematuria, or incontinence  NEUROLOGICAL: No headaches, memory loss, loss of strength, numbness, or tremors  SKIN: No itching, burning, rashes, or lesions   LYMPH NODES: No enlarged glands  ENDOCRINE: No heat or cold intolerance; No hair loss  MUSCULOSKELETAL: No joint pain or swelling; No muscle, back, or extremity pain  PSYCHIATRIC: No depression, anxiety, mood swings, or difficulty sleeping  HEME/LYMPH: No easy bruising, or bleeding gums  ALLERY AND IMMUNOLOGIC: No hives or eczema    PHYSICAL EXAM:  GENERAL: NAD, well-groomed, well-developed  HEAD:  Atraumatic, Normocephalic  EYES: EOMI, PERRLA, conjunctiva and sclera clear  ENMT: No tonsillar erythema, exudates, or enlargement; Moist mucous membranes, Good dentition, No lesions  NECK: Supple, No JVD, Normal thyroid  NERVOUS SYSTEM:  Alert & Oriented X3, Good concentration; Motor Strength 5/5 B/L upper and lower extremities; DTRs 2+ intact and symmetric  CHEST/LUNG: Clear to percussion bilaterally; No rales, rhonchi, wheezing, or rubs  HEART: Regular rate and rhythm; No murmurs, rubs, or gallops  ABDOMEN: Soft, Nontender, Nondistended; Bowel sounds present  EXTREMITIES:  2+ Peripheral Pulses, No clubbing, cyanosis, or edema  LYMPH: No lymphadenopathy noted  SKIN: No rashes or lesions    RADIOLOGY & ADDITIONAL TESTS:    Imaging Personally Reviewed:  [ ] YES  [ ] NO    Consultant(s) Notes Reviewed:  [ ] YES  [ ] NO        Care Discussed with Consultants/Other Providers [ ] YES  [ ] NO Patient is a 63y old  Female who presents with a chief complaint of Mild rhabdo, JAX (17 Jan 2024 18:28)    History of Present Illness:  Reason for Admission: Mild rhabdo, JAX  History of Present Illness:   Patient is a 64 yo Maltese speaking female  from home, living alone with CDPAP program (mohamud Collins is a caregiver 5hrs/ day 5days/wk) with a significant PMHx of HTN, DM and metastatic renal CA (follows with [facility] Dr. Benigno ramos since 2008, mets to breast and pancreas, on chemo x about a year every 2 weeks), presents from outpatient PCP office Dr. Olivera with elevate CK levels and with weakness. Patient reports that for the last 3 months she has weakness in the upper and lower extremities. Patient states that the weakness is associated with a throbbing pain 6/10 in severity. Weakness is associated with tingling in the left arm. Patient denies any syncope or trauma. Patient reports dizziness worsened with movement for the last 1 week. Patient reports increased urinary frequency and was found to have a UTI and was prescribed Macrobid which she is on Day 8 of a ten day course. Denies flank pain, fevers or chills. Denies chest pain, sob or palpitations. Denies abdominal pain, nausea or vomiting. Patient ambulates with cane.     Awake, alert and lying in bed in NAD.    INTERVAL HPI/OVERNIGHT EVENTS:  T(C): 36.5 (01-18-24 @ 05:25), Max: 36.6 (01-17-24 @ 15:35)  HR: 72 (01-18-24 @ 05:25) (72 - 81)  BP: 136/76 (01-18-24 @ 05:25) (128/74 - 157/86)  RR: 16 (01-18-24 @ 05:25) (16 - 17)  SpO2: 96% (01-18-24 @ 05:25) (95% - 98%)  Wt(kg): --  I&O's Summary      PAST MEDICAL & SURGICAL HISTORY:  HTN (hypertension)      DM (diabetes mellitus)      Renal cancer      HLD (hyperlipidemia)      S/P hysterectomy      History of kidney surgery      H/O bladder repair surgery          SOCIAL HISTORY  Alcohol:  Tobacco:  Illicit substance use:      FAMILY HISTORY:      LABS:                        10.0   6.63  )-----------( 232      ( 18 Jan 2024 04:30 )             31.4     01-18    144  |  112<H>  |  33<H>  ----------------------------<  77  3.5   |  26  |  2.00<H>    Ca    7.9<L>      18 Jan 2024 04:30  Phos  3.7     01-18  Mg     1.3     01-18    TPro  5.7<L>  /  Alb  2.0<L>  /  TBili  0.5  /  DBili  x   /  AST  48<H>  /  ALT  43  /  AlkPhos  85  01-18      Urinalysis Basic - ( 18 Jan 2024 04:30 )    Color: x / Appearance: x / SG: x / pH: x  Gluc: 77 mg/dL / Ketone: x  / Bili: x / Urobili: x   Blood: x / Protein: x / Nitrite: x   Leuk Esterase: x / RBC: x / WBC x   Sq Epi: x / Non Sq Epi: x / Bacteria: x      CAPILLARY BLOOD GLUCOSE            Urinalysis Basic - ( 18 Jan 2024 04:30 )    Color: x / Appearance: x / SG: x / pH: x  Gluc: 77 mg/dL / Ketone: x  / Bili: x / Urobili: x   Blood: x / Protein: x / Nitrite: x   Leuk Esterase: x / RBC: x / WBC x   Sq Epi: x / Non Sq Epi: x / Bacteria: x        MEDICATIONS  (STANDING):  amLODIPine   Tablet 10 milliGRAM(s) Oral daily  atorvastatin 10 milliGRAM(s) Oral at bedtime  carvedilol 12.5 milliGRAM(s) Oral every 12 hours  cyanocobalamin 1000 MICROGram(s) Oral daily  gabapentin 300 milliGRAM(s) Oral at bedtime  heparin   Injectable 5000 Unit(s) SubCutaneous every 12 hours  insulin lispro (ADMELOG) corrective regimen sliding scale   SubCutaneous three times a day before meals  insulin lispro Injectable (ADMELOG) 5 Unit(s) SubCutaneous three times a day before meals  lactated ringers. 1000 milliLiter(s) (100 mL/Hr) IV Continuous <Continuous>  levothyroxine 150 MICROGram(s) Oral daily  loratadine 10 milliGRAM(s) Oral daily  montelukast 10 milliGRAM(s) Oral at bedtime  nitrofurantoin monohydrate/macrocrystals (MACROBID) 100 milliGRAM(s) Oral two times a day    MEDICATIONS  (PRN):      REVIEW OF SYSTEMS:  CONSTITUTIONAL: No fever, weight loss, or fatigue  EYES: No eye pain, visual disturbances, or discharge  ENMT:  No difficulty hearing, tinnitus, vertigo; No sinus or throat pain  NECK: No pain or stiffness  RESPIRATORY: No cough, wheezing, chills or hemoptysis; No shortness of breath  CARDIOVASCULAR: No chest pain, palpitations, dizziness, or leg swelling  GASTROINTESTINAL: No abdominal or epigastric pain. No nausea, vomiting, or hematemesis; No diarrhea or constipation. No melena or hematochezia.  GENITOURINARY: No dysuria, frequency, hematuria, or incontinence  NEUROLOGICAL: No headaches, memory loss, loss of strength, numbness, or tremors  SKIN: No itching, burning, rashes, or lesions   LYMPH NODES: No enlarged glands  ENDOCRINE: No heat or cold intolerance; No hair loss  MUSCULOSKELETAL: No joint pain or swelling; No muscle, back, or extremity pain  PSYCHIATRIC: No depression, anxiety, mood swings, or difficulty sleeping  HEME/LYMPH: No easy bruising, or bleeding gums  ALLERY AND IMMUNOLOGIC: No hives or eczema    PHYSICAL EXAM:  GENERAL: NAD, well-groomed, well-developed  HEAD:  Atraumatic, Normocephalic  EYES: EOMI, PERRLA, conjunctiva and sclera clear  ENMT: No tonsillar erythema, exudates, or enlargement; Moist mucous membranes, Good dentition, No lesions  NECK: Supple, No JVD, Normal thyroid  NERVOUS SYSTEM:  Alert & Oriented X3, Good concentration; Motor Strength 5/5 B/L upper and lower extremities; DTRs 2+ intact and symmetric  CHEST/LUNG: Clear to percussion bilaterally; No rales, rhonchi, wheezing, or rubs  HEART: Regular rate and rhythm; No murmurs, rubs, or gallops  ABDOMEN: Soft, Nontender, Nondistended; Bowel sounds present  EXTREMITIES:  2+ Peripheral Pulses, No clubbing, cyanosis, or edema  LYMPH: No lymphadenopathy noted  SKIN: No rashes or lesions    RADIOLOGY & ADDITIONAL TESTS:  < from: US Renal (01.18.24 @ 10:06) >  IMPRESSION:  Left nephrectomy.  No right renal mass, hydronephrosis or calculus is   visualized sonographically.      < end of copied text >    Imaging Personally Reviewed:  [x ] YES  [ ] NO    Consultant(s) Notes Reviewed:  [x ] YES  [ ] NO        Care Discussed with Consultants/Other Providers [ x] YES  [ ] NO

## 2024-01-18 NOTE — PATIENT PROFILE ADULT - FALL HARM RISK - HARM RISK INTERVENTIONS

## 2024-01-18 NOTE — PROGRESS NOTE ADULT - SUBJECTIVE AND OBJECTIVE BOX
HPI:  Patient is a 64 yo Romanian speaking female  from home, living alone with CDPAP program (mohamud Collins is a caregiver 5hrs/ day 5days/wk) with a significant PMHx of HTN, DM and metastatic renal CA (follows with [facility] Dr. Benigno ramos since 2008, mets to breast and pancreas, on chemo x about a year every 2 weeks), presents from outpatient PCP office Dr. Olivera with elevate CK levels and with weakness. Patient reports that for the last 3 months she has weakness in the upper and lower extremities. Patient states that the weakness is associated with a throbbing pain 6/10 in severity. Weakness is associated with tingling in the left arm. Patient denies any syncope or trauma. Patient reports dizziness worsened with movement for the last 1 week. Patient reports increased urinary frequency and was found to have a UTI and was prescribed Macrobid which she is on Day 8 of a ten day course. Denies flank pain, fevers or chills. Denies chest pain, sob or palpitations. Denies abdominal pain, nausea or vomiting. Patient ambulates with cane.  (17 Jan 2024 18:28)      OVERNIGHT EVENTS:    No new overnight events.  Seen and examined at bedside.     REVIEW OF SYSTEMS:      CONSTITUTIONAL: No fever  EYES: no acute visual disturbances  NECK: No pain or stiffness  RESPIRATORY: No cough; No shortness of breath  CARDIOVASCULAR: No chest pain, no palpitations  GASTROINTESTINAL: No pain. No nausea, vomiting or diarrhea   NEUROLOGICAL: No headache or numbness, no tremors  MUSCULOSKELETAL: No joint pain, no muscle pain  GENITOURINARY: no dysuria, no frequency, no hesitancy  PSYCHIATRY: no depression, no anxiety  ALL OTHER  ROS negative        Vital Signs Last 24 Hrs  T(C): 36.5 (18 Jan 2024 05:25), Max: 36.6 (17 Jan 2024 15:35)  T(F): 97.7 (18 Jan 2024 05:25), Max: 97.9 (17 Jan 2024 15:35)  HR: 72 (18 Jan 2024 05:25) (72 - 81)  BP: 136/76 (18 Jan 2024 05:25) (128/74 - 157/86)  BP(mean): --  RR: 16 (18 Jan 2024 05:25) (16 - 17)  SpO2: 96% (18 Jan 2024 05:25) (95% - 98%)    Parameters below as of 18 Jan 2024 05:25  Patient On (Oxygen Delivery Method): room air        ________________________________________________  PHYSICAL EXAM:    GENERAL: NAD  HEENT: Normocephalic; conjunctivae and sclerae clear;  NECK : supple, no JVD  CHEST/LUNG: Clear to auscultation; Nonlabored  HEART: S1 S2  regular  ABDOMEN: Soft, Nontender, Nondistended; Bowel sounds present  EXTREMITIES: no cyanosis; no LE edema; no calf tenderness  NERVOUS SYSTEM:  Alert; no new deficits  SKIN: warm and dry; No new rashes or lesions    _________________________________________________  CURRENT MEDICATIONS:    MEDICATIONS  (STANDING):  amLODIPine   Tablet 10 milliGRAM(s) Oral daily  atorvastatin 10 milliGRAM(s) Oral at bedtime  carvedilol 12.5 milliGRAM(s) Oral every 12 hours  cyanocobalamin 1000 MICROGram(s) Oral daily  gabapentin 300 milliGRAM(s) Oral at bedtime  heparin   Injectable 5000 Unit(s) SubCutaneous every 12 hours  insulin lispro (ADMELOG) corrective regimen sliding scale   SubCutaneous three times a day before meals  insulin lispro Injectable (ADMELOG) 5 Unit(s) SubCutaneous three times a day before meals  lactated ringers. 1000 milliLiter(s) (100 mL/Hr) IV Continuous <Continuous>  levothyroxine 150 MICROGram(s) Oral daily  loratadine 10 milliGRAM(s) Oral daily  montelukast 10 milliGRAM(s) Oral at bedtime  nitrofurantoin monohydrate/macrocrystals (MACROBID) 100 milliGRAM(s) Oral two times a day    MEDICATIONS  (PRN):      __________________________________________________  LABS:                          10.0   6.63  )-----------( 232      ( 18 Jan 2024 04:30 )             31.4     01-18    144  |  112<H>  |  33<H>  ----------------------------<  77  3.5   |  26  |  2.00<H>    Ca    7.9<L>      18 Jan 2024 04:30  Phos  3.7     01-18  Mg     1.3     01-18    TPro  5.7<L>  /  Alb  2.0<L>  /  TBili  0.5  /  DBili  x   /  AST  48<H>  /  ALT  43  /  AlkPhos  85  01-18      Urinalysis Basic - ( 18 Jan 2024 04:30 )    Color: x / Appearance: x / SG: x / pH: x  Gluc: 77 mg/dL / Ketone: x  / Bili: x / Urobili: x   Blood: x / Protein: x / Nitrite: x   Leuk Esterase: x / RBC: x / WBC x   Sq Epi: x / Non Sq Epi: x / Bacteria: x      CAPILLARY BLOOD GLUCOSE      POCT Blood Glucose.: 88 mg/dL (18 Jan 2024 11:24)  POCT Blood Glucose.: 72 mg/dL (18 Jan 2024 07:46)      __________________________________________________  RADIOLOGY & ADDITIONAL TESTS:    Imaging Personally Reviewed:  YES    < from: US Renal (01.18.24 @ 10:06) >  IMPRESSION:  Left nephrectomy.  No right renal mass, hydronephrosis or calculus is   visualized sonographically.    < end of copied text >    Consultant(s) Notes Reviewed:   YES     Plan of care was discussed with patient and /or primary care giver; all questions and concerns were addressed and care was aligned with patient's wishes.    Plan discussed with attending and consulting physicians.

## 2024-01-18 NOTE — CONSULT NOTE ADULT - NS ATTEND AMEND GEN_ALL_CORE FT
pt seen and examined. Chart reviewed and case d/w ACP with agreement of her A/p. 62 y/o female with renal cell carcinoma mets to pancrease and breast. on treatment with IO failed and now on Cabometyx with good response and on remission. admit for body pain/weakness and was found to have rhabdo/renal failure. also has diarrhea. recommend hold Cabometyx and GI consult for diarrhea and renal consult for renal failure and rhobdo is improving with decreased CPK. case d/w Dr. Pelaez. will f/u

## 2024-01-18 NOTE — PATIENT PROFILE ADULT - NSPROEXTENSIONSOFSELF_GEN_A_NUR
Hemigard Intro: Due to skin fragility and wound tension, it was decided to use HEMIGARD adhesive retention suture devices to permit a linear closure. The skin was cleaned and dried for a 6cm distance away from the wound. Excessive hair, if present, was removed to allow for adhesion. cane/eyeglasses

## 2024-01-18 NOTE — PROGRESS NOTE ADULT - PROBLEM SELECTOR PLAN 3
Started ABX as outpatient  Continue Macrobid to complete course  Urine culture pending Mag 1.4  Replete  Trend BMP, Mag & Phos

## 2024-01-19 LAB
ANION GAP SERPL CALC-SCNC: 9 MMOL/L — SIGNIFICANT CHANGE UP (ref 5–17)
BUN SERPL-MCNC: 27 MG/DL — HIGH (ref 7–23)
CALCIUM SERPL-MCNC: 8.3 MG/DL — LOW (ref 8.4–10.5)
CHLORIDE SERPL-SCNC: 109 MMOL/L — HIGH (ref 96–108)
CK SERPL-CCNC: 390 U/L — HIGH (ref 25–170)
CO2 SERPL-SCNC: 22 MMOL/L — SIGNIFICANT CHANGE UP (ref 22–31)
CREAT SERPL-MCNC: 1.72 MG/DL — HIGH (ref 0.5–1.3)
EGFR: 33 ML/MIN/1.73M2 — LOW
GLUCOSE BLDC GLUCOMTR-MCNC: 118 MG/DL — HIGH (ref 70–99)
GLUCOSE BLDC GLUCOMTR-MCNC: 125 MG/DL — HIGH (ref 70–99)
GLUCOSE BLDC GLUCOMTR-MCNC: 161 MG/DL — HIGH (ref 70–99)
GLUCOSE BLDC GLUCOMTR-MCNC: 183 MG/DL — HIGH (ref 70–99)
GLUCOSE SERPL-MCNC: 155 MG/DL — HIGH (ref 70–99)
HCT VFR BLD CALC: 31.7 % — LOW (ref 34.5–45)
HGB BLD-MCNC: 10.1 G/DL — LOW (ref 11.5–15.5)
MAGNESIUM SERPL-MCNC: 1.9 MG/DL — SIGNIFICANT CHANGE UP (ref 1.6–2.6)
MCHC RBC-ENTMCNC: 31.6 PG — SIGNIFICANT CHANGE UP (ref 27–34)
MCHC RBC-ENTMCNC: 31.9 GM/DL — LOW (ref 32–36)
MCV RBC AUTO: 99.1 FL — SIGNIFICANT CHANGE UP (ref 80–100)
PHOSPHATE SERPL-MCNC: 3.4 MG/DL — SIGNIFICANT CHANGE UP (ref 2.5–4.5)
PLATELET # BLD AUTO: 236 K/UL — SIGNIFICANT CHANGE UP (ref 150–400)
POTASSIUM SERPL-MCNC: 4.6 MMOL/L — SIGNIFICANT CHANGE UP (ref 3.5–5.3)
POTASSIUM SERPL-SCNC: 4.6 MMOL/L — SIGNIFICANT CHANGE UP (ref 3.5–5.3)
RBC # BLD: 3.2 M/UL — LOW (ref 3.8–5.2)
RBC # FLD: 15.4 % — HIGH (ref 10.3–14.5)
SODIUM SERPL-SCNC: 140 MMOL/L — SIGNIFICANT CHANGE UP (ref 135–145)
WBC # BLD: 5.41 K/UL — SIGNIFICANT CHANGE UP (ref 3.8–10.5)
WBC # FLD AUTO: 5.41 K/UL — SIGNIFICANT CHANGE UP (ref 3.8–10.5)

## 2024-01-19 RX ORDER — SODIUM CHLORIDE 9 MG/ML
1000 INJECTION, SOLUTION INTRAVENOUS
Refills: 0 | Status: DISCONTINUED | OUTPATIENT
Start: 2024-01-19 | End: 2024-01-20

## 2024-01-19 RX ORDER — CEFTRIAXONE 500 MG/1
1000 INJECTION, POWDER, FOR SOLUTION INTRAMUSCULAR; INTRAVENOUS EVERY 24 HOURS
Refills: 0 | Status: DISCONTINUED | OUTPATIENT
Start: 2024-01-19 | End: 2024-01-20

## 2024-01-19 RX ADMIN — LORATADINE 10 MILLIGRAM(S): 10 TABLET ORAL at 11:28

## 2024-01-19 RX ADMIN — MONTELUKAST 10 MILLIGRAM(S): 4 TABLET, CHEWABLE ORAL at 21:59

## 2024-01-19 RX ADMIN — Medication 1: at 16:55

## 2024-01-19 RX ADMIN — CARVEDILOL PHOSPHATE 12.5 MILLIGRAM(S): 80 CAPSULE, EXTENDED RELEASE ORAL at 06:29

## 2024-01-19 RX ADMIN — CEFTRIAXONE 100 MILLIGRAM(S): 500 INJECTION, POWDER, FOR SOLUTION INTRAMUSCULAR; INTRAVENOUS at 20:37

## 2024-01-19 RX ADMIN — SODIUM CHLORIDE 100 MILLILITER(S): 9 INJECTION, SOLUTION INTRAVENOUS at 09:13

## 2024-01-19 RX ADMIN — AMLODIPINE BESYLATE 10 MILLIGRAM(S): 2.5 TABLET ORAL at 06:27

## 2024-01-19 RX ADMIN — CARVEDILOL PHOSPHATE 12.5 MILLIGRAM(S): 80 CAPSULE, EXTENDED RELEASE ORAL at 17:46

## 2024-01-19 RX ADMIN — Medication 5 UNIT(S): at 08:30

## 2024-01-19 RX ADMIN — PREGABALIN 1000 MICROGRAM(S): 225 CAPSULE ORAL at 11:29

## 2024-01-19 RX ADMIN — Medication 150 MICROGRAM(S): at 06:27

## 2024-01-19 RX ADMIN — HEPARIN SODIUM 5000 UNIT(S): 5000 INJECTION INTRAVENOUS; SUBCUTANEOUS at 17:46

## 2024-01-19 RX ADMIN — Medication 100 MILLIGRAM(S): at 17:46

## 2024-01-19 RX ADMIN — Medication 100 MILLIGRAM(S): at 06:27

## 2024-01-19 RX ADMIN — GABAPENTIN 300 MILLIGRAM(S): 400 CAPSULE ORAL at 21:59

## 2024-01-19 RX ADMIN — HEPARIN SODIUM 5000 UNIT(S): 5000 INJECTION INTRAVENOUS; SUBCUTANEOUS at 06:28

## 2024-01-19 RX ADMIN — Medication 5 UNIT(S): at 16:55

## 2024-01-19 NOTE — PROGRESS NOTE ADULT - PROBLEM SELECTOR PLAN 4
Started ABX as outpatient  Continue Macrobid to complete course  Urine culture growing E. coli  F/u final Ucx & sensitivities Started ABX as outpatient  Continue Macrobid to complete course  Urine culture growing E. coli  F/u final Ucx & sensitivities  ID Dr. Rojas consulted

## 2024-01-19 NOTE — CONSULT NOTE ADULT - SUBJECTIVE AND OBJECTIVE BOX
Patient is a 63y old  Female who is  from home, living alone with CDPAP program (mohamud Collins is a caregiver 5hrs/ day 5days/wk) with a significant PMHx of HTN, DM and metastatic renal CA (follows with [facility] Dr. Benigno ramos since 2008, mets to breast and pancreas, on chemo x about a year every 2 weeks), now sent in to the ER from outpatient PCP office Dr. Olivera for evaluation of elevated CK levels and with weakness. Patient reports that for the last 3 months she has weakness in the upper and lower extremities. Patient homero reports increased urinary frequency and was found to have a UTI and was prescribed Macrobid which she is on Day 8 of a ten day course. On admission, she found to have no fever, no leukocytosis but mild positive urine analysis. The Culture grew E.coli , hence, The ID consult requested to assist with antibiotic management.      REVIEW OF SYSTEMS: Total of twelve systems have been reviewed  and found to be negative unless mentioned in HPI      PAST MEDICAL & SURGICAL HISTORY:  HTN (hypertension)  DM (diabetes mellitus)  Renal cancer  HLD (hyperlipidemia)  S/P hysterectomy  History of kidney surgery  H/O bladder repair surgery      SOCIAL HISTORY  Alcohol: Does not drink  Tobacco: Does not smoke  Illicit substance use: None      FAMILY HISTORY: Non contributory to the present illness      ALLERGIES: sulfa drugs (Hives)      Vital Signs Last 24 Hrs  T(C): 36.6 (19 Jan 2024 14:37), Max: 36.8 (19 Jan 2024 05:13)  T(F): 97.8 (19 Jan 2024 14:37), Max: 98.3 (19 Jan 2024 05:13)  HR: 74 (19 Jan 2024 17:55) (71 - 84)  BP: 149/80 (19 Jan 2024 17:55) (127/75 - 149/80)  BP(mean): 92 (18 Jan 2024 20:40) (92 - 92)  RR: 18 (19 Jan 2024 14:37) (16 - 18)  SpO2: 98% (19 Jan 2024 14:37) (96% - 98%)    Parameters below as of 19 Jan 2024 14:37  Patient On (Oxygen Delivery Method): room air      PHYSICAL EXAM:  GENERAL: Not in distress   CHEST/LUNG:  Not using accessory muscles   HEART: s1 and s2 present  ABDOMEN:  Nontender and  Nondistended  EXTREMITIES: No pedal  edema  CNS: Awake and Alert      LABS:                        10.1   5.41  )-----------( 236      ( 19 Jan 2024 07:03 )             31.7       01-19    140  |  109<H>  |  27<H>  ----------------------------<  155<H>  4.6   |  22  |  1.72<H>    Ca    8.3<L>      19 Jan 2024 07:03  Phos  3.4     01-19  Mg     1.9     01-19    TPro  5.7<L>  /  Alb  2.0<L>  /  TBili  0.5  /  DBili  x   /  AST  48<H>  /  ALT  43  /  AlkPhos  85  01-18      CAPILLARY BLOOD GLUCOSE  POCT Blood Glucose.: 183 mg/dL (19 Jan 2024 16:45)  POCT Blood Glucose.: 118 mg/dL (19 Jan 2024 11:33)  POCT Blood Glucose.: 125 mg/dL (19 Jan 2024 08:22)        MEDICATIONS  (STANDING):    amLODIPine   Tablet 10 milliGRAM(s) Oral daily  atorvastatin 10 milliGRAM(s) Oral at bedtime  carvedilol 12.5 milliGRAM(s) Oral every 12 hours  cyanocobalamin 1000 MICROGram(s) Oral daily  gabapentin 300 milliGRAM(s) Oral at bedtime  heparin   Injectable 5000 Unit(s) SubCutaneous every 12 hours  insulin lispro (ADMELOG) corrective regimen sliding scale   SubCutaneous three times a day before meals  insulin lispro Injectable (ADMELOG) 5 Unit(s) SubCutaneous three times a day before meals  lactated ringers. 1000 milliLiter(s) (100 mL/Hr) IV Continuous <Continuous>  lactated ringers. 1000 milliLiter(s) (100 mL/Hr) IV Continuous <Continuous>  levothyroxine 150 MICROGram(s) Oral daily  loratadine 10 milliGRAM(s) Oral daily  montelukast 10 milliGRAM(s) Oral at bedtime  nitrofurantoin monohydrate/macrocrystals (MACROBID) 100 milliGRAM(s) Oral two times a day        RADIOLOGY & ADDITIONAL TESTS:    1/18/24: US Renal (01.18.24 @ 10:06) Left nephrectomy.  No right renal mass, hydronephrosis or calculus is   visualized sonographically.      MICROBIOLOGY DATA:    Culture - Urine (01.17.24 @ 14:47)   Specimen Source: Clean Catch Clean Catch (Midstream)  Culture Results:   >100,000 CFU/ml Escherichia coli  Urinalysis + Microscopic Examination (01.17.24 @ 14:47)   pH Urine: 6.0  Urine Appearance: Clear  Color: Yellow  Specific Gravity: 1.009  Protein, Urine: 100 mg/dL  Glucose Qualitative, Urine: 250 mg/dL  Ketone - Urine: Negative mg/dL  Blood, Urine: Small  Bilirubin: Negative  Urobilinogen: 0.2 mg/dL  Leukocyte Esterase Concentration: Trace  Nitrite: Negative  White Blood Cell - Urine: 8 /HPF  Red Blood Cell - Urine: 15 /HPF  Bacteria: Moderate /HPF  Hyaline Casts: Present  Squamous Epithelial Cells: Present

## 2024-01-19 NOTE — PROGRESS NOTE ADULT - PROBLEM SELECTOR PLAN 6
Controlled  Takes Amlodipine 10 mg QD, Carvedilol 12.5 mg BID, Valsartan 30 mg QD, and Lasix 40 mg QD at home  Continue home regimen CCB and BB  Hold ARB and diuretic ISO JAX  DASH diet

## 2024-01-19 NOTE — PROGRESS NOTE ADULT - SUBJECTIVE AND OBJECTIVE BOX
NP Note discussed with  primary attending    Patient is a 63y old  Female who presents with a chief complaint of Mild rhabdo, JAX (19 Jan 2024 06:56)      INTERVAL HPI/OVERNIGHT EVENTS: no new complaints    MEDICATIONS  (STANDING):  amLODIPine   Tablet 10 milliGRAM(s) Oral daily  atorvastatin 10 milliGRAM(s) Oral at bedtime  carvedilol 12.5 milliGRAM(s) Oral every 12 hours  cyanocobalamin 1000 MICROGram(s) Oral daily  gabapentin 300 milliGRAM(s) Oral at bedtime  heparin   Injectable 5000 Unit(s) SubCutaneous every 12 hours  insulin lispro (ADMELOG) corrective regimen sliding scale   SubCutaneous three times a day before meals  insulin lispro Injectable (ADMELOG) 5 Unit(s) SubCutaneous three times a day before meals  lactated ringers. 1000 milliLiter(s) (100 mL/Hr) IV Continuous <Continuous>  lactated ringers. 1000 milliLiter(s) (100 mL/Hr) IV Continuous <Continuous>  levothyroxine 150 MICROGram(s) Oral daily  loratadine 10 milliGRAM(s) Oral daily  montelukast 10 milliGRAM(s) Oral at bedtime  nitrofurantoin monohydrate/macrocrystals (MACROBID) 100 milliGRAM(s) Oral two times a day    MEDICATIONS  (PRN):      __________________________________________________  REVIEW OF SYSTEMS:    CONSTITUTIONAL: No fever,   EYES: no acute visual disturbances  NECK: No pain or stiffness  RESPIRATORY: No cough; No shortness of breath  CARDIOVASCULAR: No chest pain, no palpitations  GASTROINTESTINAL: No pain. No nausea or vomiting; No diarrhea   NEUROLOGICAL: No headache or numbness, no tremors  MUSCULOSKELETAL: No joint pain, no muscle pain  GENITOURINARY: no dysuria, no frequency, no hesitancy  PSYCHIATRY: no depression , no anxiety  ALL OTHER  ROS negative        Vital Signs Last 24 Hrs  T(C): 36.8 (19 Jan 2024 05:13), Max: 36.8 (19 Jan 2024 05:13)  T(F): 98.3 (19 Jan 2024 05:13), Max: 98.3 (19 Jan 2024 05:13)  HR: 71 (19 Jan 2024 05:13) (71 - 84)  BP: 134/70 (19 Jan 2024 05:13) (127/75 - 137/78)  BP(mean): 92 (18 Jan 2024 20:40) (92 - 92)  RR: 18 (19 Jan 2024 05:13) (16 - 18)  SpO2: 96% (19 Jan 2024 05:13) (96% - 97%)    Parameters below as of 19 Jan 2024 05:13  Patient On (Oxygen Delivery Method): room air        ________________________________________________  PHYSICAL EXAM:  GENERAL: NAD  HEENT: Normocephalic;  conjunctivae and sclerae clear; moist mucous membranes;   NECK : supple  CHEST/LUNG: Clear to ausculitation bilaterally with good air entry   HEART: S1 S2  regular; no murmurs, gallops or rubs  ABDOMEN: Soft, Nontender, Nondistended; Bowel sounds present  EXTREMITIES: no cyanosis; no edema; no calf tenderness  SKIN: warm and dry; no rash  NERVOUS SYSTEM:  Awake and alert; Oriented  to place, person and time ; no new deficits    _________________________________________________  LABS:                        10.1   5.41  )-----------( 236      ( 19 Jan 2024 07:03 )             31.7     01-18    143  |  111<H>  |  30<H>  ----------------------------<  114<H>  4.0   |  27  |  1.86<H>    Ca    8.1<L>      18 Jan 2024 12:15  Phos  3.7     01-18  Mg     1.4     01-18    TPro  5.7<L>  /  Alb  2.0<L>  /  TBili  0.5  /  DBili  x   /  AST  48<H>  /  ALT  43  /  AlkPhos  85  01-18      Urinalysis Basic - ( 18 Jan 2024 12:15 )    Color: x / Appearance: x / SG: x / pH: x  Gluc: 114 mg/dL / Ketone: x  / Bili: x / Urobili: x   Blood: x / Protein: x / Nitrite: x   Leuk Esterase: x / RBC: x / WBC x   Sq Epi: x / Non Sq Epi: x / Bacteria: x      CAPILLARY BLOOD GLUCOSE      POCT Blood Glucose.: 118 mg/dL (19 Jan 2024 11:33)  POCT Blood Glucose.: 125 mg/dL (19 Jan 2024 08:22)  POCT Blood Glucose.: 154 mg/dL (18 Jan 2024 17:26)        RADIOLOGY & ADDITIONAL TESTS:  < from: US Renal (01.18.24 @ 10:06) >  IMPRESSION:  Left nephrectomy.  No right renal mass, hydronephrosis or calculus is   visualized sonographically.          < end of copied text >    Imaging Personally Reviewed:  YES    Consultant(s) Notes Reviewed:   YES    Care Discussed with Consultants :     Plan of care was discussed with patient and /or primary care giver; all questions and concerns were addressed and care was aligned with patient's wishes.

## 2024-01-19 NOTE — CONSULT NOTE ADULT - ASSESSMENT
Patient is a 63y old  Female who is  from home, living alone with CDPAP program (mohamud Collins is a caregiver 5hrs/ day 5days/wk) with a significant PMHx of HTN, DM and metastatic renal CA (follows with [facility] Dr. Benigno ramos since 2008, mets to breast and pancreas, on chemo x about a year every 2 weeks), now sent in to the ER from outpatient PCP office Dr. Olivera for evaluation of elevated CK levels and with weakness. Patient reports that for the last 3 months she has weakness in the upper and lower extremities. Patient homero reports increased urinary frequency and was found to have a UTI and was prescribed Macrobid which she is on Day 8 of a ten day course. On admission, she found to have no fever, no leukocytosis but mild positive urine analysis. The Culture grew E.coli , hence, The ID consult requested to assist with antibiotic management.    # UTI - Urine culture grew E.coli  # Rhabdomyolitis      would recommend:    1. Follow up Final Urine culture  2. Start Ceftriaxone until culture is finalized  3. Monitor kidney function and IVF    will follow the patient with you and make further recommendation based on the clinical course and Lab results  Thank you for the opportunity to participate in Ms. JOHNSON's care    Attending Attestation:    Spent more than 65 minutes on total encounter, more than 50 % of the visit was spent counseling and/or coordinating care by the Attending physician.    
62 yo [place]raini speaking female  from home, living alone with CDPAP program (mohamud Collins is a caregiver 5hrs/ day 5days/wk) with a significant PMHx of HTN, DM and metastatic renal CA (follows with [facility] Pelaez dx since 2008, mets to breast and pancreas, on chemo x about a year every 2 weeks), presents from outpatient PCP office Dr. Olivera with elevate CK levels and with weakness. Patient reports that for the last 3 months she has weakness in the upper and lower extremities. Patient states that the weakness is associated with a throbbing pain 6/10 in severity. Weakness is associated with tingling in the left arm. Patient denies any syncope or trauma. Patient reports dizziness worsened with movement for the last 1 week. Patient reports increased urinary frequency and was found to have a UTI and was prescribed Macrobid which she is on Day 8 of a ten day course. Denies flank pain, fevers or chills. Denies chest pain, sob or palpitations. Denies abdominal pain, nausea or vomiting. Patient ambulates with cane.     #Met RCC  #Anemia of renal insufficiency  follows with our colleague Dr. Pelaez, s/p left nephrectomy, currently on cabometyx and was on procrit. Last seen 10/2023  p/w Rhabdo and JAX  Cr 1.8  CK 1373-->811-->378  TSH=9.2, pt is on levo  Renal US: no hydro  Rec's:  -Hold cabometyx  -Neprhology following  -IVF  -Endo consult  upon d/c pt to f/u with Dr. Pelaez    #Diarrhea  5 episodes today  GI consult  Mgmt as per Medicine Team    #VTE prophylaxis    Thank you for the referral. Will continue to monitor the patient.  Please call with any questions [phone]  Above reviewed with Attending Dr. Chris [facility]  [address] [facility], [address], [place], [place]  [phone]  *Note not finalized until signed by Attending Physician  
63 years old with history of Left nephrectomy admitted for UTI .  Unknown recent renal function, renal function improved after admission as ARB and diuretics were stopped and was placed on IV fluids.    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  JAX , possible sepsis , med s, hypovolemia , renal function improved.  UTI  In view of low EGFR 30 ML/MINUTE suggest to DC Nitrofurantoin if EGFR below 30 ml per minute.   ID follow up as per PMD.     Await urine culture and sensitivity.

## 2024-01-19 NOTE — PROGRESS NOTE ADULT - PROBLEM SELECTOR PLAN 11
Anticipate clearance for discharge when Rhabdo and JAX resolved
Anticipate clearance for discharge when Rhabdo and JAX resolved

## 2024-01-19 NOTE — PROGRESS NOTE ADULT - SUBJECTIVE AND OBJECTIVE BOX
pt seen in icu [  ], reg med floor [ x ], bed [ x ], chair at bedside [   ]  Awake, alert and lying in bed in NAD.    REVIEW OF SYSTEMS:    CONSTITUTIONAL: No weakness, fevers or chills  EYES/ENT: No visual changes;  No vertigo or throat pain   NECK: No pain or stiffness  RESPIRATORY: No cough, wheezing, hemoptysis; No shortness of breath  CARDIOVASCULAR: No chest pain or palpitations  GASTROINTESTINAL: No abdominal or epigastric pain. No nausea, vomiting, or hematemesis; No diarrhea or constipation. No melena or hematochezia.  GENITOURINARY: No dysuria, frequency or hematuria  NEUROLOGICAL: No numbness or weakness  SKIN: No itching, burning, rashes, or lesions   All other review of systems is negative unless indicated above.    Physical Exam    General: WN/WD NAD  Neurology: A&Ox3, nonfocal, STEWART x 4  Respiratory: CTA B/L  CV: RRR, S1S2, no murmurs, rubs or gallops  Abdominal: Soft, NT, ND +BS, Last BM  Extremities: No edema, + peripheral pulses      Allergies  sulfa drugs (Hives)      Health Issues  Abnormal levels of other serum enzymes    HTN (hypertension)    DM (diabetes mellitus)    Renal cancer    HLD (hyperlipidemia)    S/P hysterectomy    History of kidney surgery    H/O bladder repair surgery        Vitals  T(F): 98.3 (01-19-24 @ 05:13), Max: 98.3 (01-19-24 @ 05:13)  HR: 71 (01-19-24 @ 05:13) (71 - 84)  BP: 134/70 (01-19-24 @ 05:13) (127/75 - 137/78)  RR: 18 (01-19-24 @ 05:13) (16 - 18)  SpO2: 96% (01-19-24 @ 05:13) (96% - 97%)  Wt(kg): --  CAPILLARY BLOOD GLUCOSE      POCT Blood Glucose.: 154 mg/dL (18 Jan 2024 17:26)      Labs                          10.0   6.63  )-----------( 232      ( 18 Jan 2024 04:30 )             31.4       01-18    143  |  111<H>  |  30<H>  ----------------------------<  114<H>  4.0   |  27  |  1.86<H>    Ca    8.1<L>      18 Jan 2024 12:15  Phos  3.7     01-18  Mg     1.4     01-18    TPro  5.7<L>  /  Alb  2.0<L>  /  TBili  0.5  /  DBili  x   /  AST  48<H>  /  ALT  43  /  AlkPhos  85  01-18      CARDIAC MARKERS ( 18 Jan 2024 16:20 )  x     / x     / 378 U/L / x     / x      CARDIAC MARKERS ( 18 Jan 2024 04:30 )  x     / x     / 811 U/L / x     / x      CARDIAC MARKERS ( 17 Jan 2024 14:47 )  x     / x     / 1373 U/L / x     / x            Radiology Results      Meds    MEDICATIONS  (STANDING):  amLODIPine   Tablet 10 milliGRAM(s) Oral daily  atorvastatin 10 milliGRAM(s) Oral at bedtime  carvedilol 12.5 milliGRAM(s) Oral every 12 hours  cyanocobalamin 1000 MICROGram(s) Oral daily  gabapentin 300 milliGRAM(s) Oral at bedtime  heparin   Injectable 5000 Unit(s) SubCutaneous every 12 hours  insulin lispro (ADMELOG) corrective regimen sliding scale   SubCutaneous three times a day before meals  insulin lispro Injectable (ADMELOG) 5 Unit(s) SubCutaneous three times a day before meals  lactated ringers. 1000 milliLiter(s) (100 mL/Hr) IV Continuous <Continuous>  levothyroxine 150 MICROGram(s) Oral daily  loratadine 10 milliGRAM(s) Oral daily  montelukast 10 milliGRAM(s) Oral at bedtime  nitrofurantoin monohydrate/macrocrystals (MACROBID) 100 milliGRAM(s) Oral two times a day      MEDICATIONS  (PRN):         pt seen in icu [  ], reg med floor [ x ], bed [ x ], chair at bedside [   ]  Awake, alert and lying in bed in NAD.    REVIEW OF SYSTEMS:    CONSTITUTIONAL: No weakness, fevers or chills  EYES/ENT: No visual changes;  No vertigo or throat pain   NECK: No pain or stiffness  RESPIRATORY: No cough, wheezing, hemoptysis; No shortness of breath  CARDIOVASCULAR: No chest pain or palpitations  GASTROINTESTINAL: No abdominal or epigastric pain. No nausea, vomiting, or hematemesis; No diarrhea or constipation. No melena or hematochezia.  GENITOURINARY: No dysuria, frequency or hematuria  NEUROLOGICAL: No numbness or weakness  SKIN: No itching, burning, rashes, or lesions   All other review of systems is negative unless indicated above.    Physical Exam    General: WN/WD NAD  Neurology: A&Ox3, nonfocal, STEWART x 4  Respiratory: CTA B/L  CV: RRR, S1S2, no murmurs, rubs or gallops  Abdominal: Soft, NT, ND +BS, Last BM  Extremities: No edema, + peripheral pulses      Allergies  sulfa drugs (Hives)      Health Issues  Abnormal levels of other serum enzymes    HTN (hypertension)    DM (diabetes mellitus)    Renal cancer    HLD (hyperlipidemia)    S/P hysterectomy    History of kidney surgery    H/O bladder repair surgery        Vitals  T(F): 98.3 (01-19-24 @ 05:13), Max: 98.3 (01-19-24 @ 05:13)  HR: 71 (01-19-24 @ 05:13) (71 - 84)  BP: 134/70 (01-19-24 @ 05:13) (127/75 - 137/78)  RR: 18 (01-19-24 @ 05:13) (16 - 18)  SpO2: 96% (01-19-24 @ 05:13) (96% - 97%)  Wt(kg): --  CAPILLARY BLOOD GLUCOSE      POCT Blood Glucose.: 154 mg/dL (18 Jan 2024 17:26)      Labs                          10.0   6.63  )-----------( 232      ( 18 Jan 2024 04:30 )             31.4       01-18    143  |  111<H>  |  30<H>  ----------------------------<  114<H>  4.0   |  27  |  1.86<H>    Ca    8.1<L>      18 Jan 2024 12:15  Phos  3.7     01-18  Mg     1.4     01-18    TPro  5.7<L>  /  Alb  2.0<L>  /  TBili  0.5  /  DBili  x   /  AST  48<H>  /  ALT  43  /  AlkPhos  85  01-18    Creatine Kinase, Serum (01.18.24 @ 16:20)   Creatine Kinase, Serum: 378 U/L  CARDIAC MARKERS ( 18 Jan 2024 16:20 )  x     / x     / 378 U/L / x     / x      CARDIAC MARKERS ( 18 Jan 2024 04:30 )  x     / x     / 811 U/L / x     / x      CARDIAC MARKERS ( 17 Jan 2024 14:47 )  x     / x     / 1373 U/L / x     / x            Radiology Results      Meds    MEDICATIONS  (STANDING):  amLODIPine   Tablet 10 milliGRAM(s) Oral daily  atorvastatin 10 milliGRAM(s) Oral at bedtime  carvedilol 12.5 milliGRAM(s) Oral every 12 hours  cyanocobalamin 1000 MICROGram(s) Oral daily  gabapentin 300 milliGRAM(s) Oral at bedtime  heparin   Injectable 5000 Unit(s) SubCutaneous every 12 hours  insulin lispro (ADMELOG) corrective regimen sliding scale   SubCutaneous three times a day before meals  insulin lispro Injectable (ADMELOG) 5 Unit(s) SubCutaneous three times a day before meals  lactated ringers. 1000 milliLiter(s) (100 mL/Hr) IV Continuous <Continuous>  levothyroxine 150 MICROGram(s) Oral daily  loratadine 10 milliGRAM(s) Oral daily  montelukast 10 milliGRAM(s) Oral at bedtime  nitrofurantoin monohydrate/macrocrystals (MACROBID) 100 milliGRAM(s) Oral two times a day      MEDICATIONS  (PRN):

## 2024-01-20 ENCOUNTER — TRANSCRIPTION ENCOUNTER (OUTPATIENT)
Age: 64
End: 2024-01-20

## 2024-01-20 VITALS
SYSTOLIC BLOOD PRESSURE: 144 MMHG | TEMPERATURE: 98 F | OXYGEN SATURATION: 99 % | HEART RATE: 876 BPM | RESPIRATION RATE: 18 BRPM | DIASTOLIC BLOOD PRESSURE: 85 MMHG

## 2024-01-20 LAB
-  AMOXICILLIN/CLAVULANIC ACID: SIGNIFICANT CHANGE UP
-  AMPICILLIN/SULBACTAM: SIGNIFICANT CHANGE UP
-  AMPICILLIN: SIGNIFICANT CHANGE UP
-  AZTREONAM: SIGNIFICANT CHANGE UP
-  CEFAZOLIN: SIGNIFICANT CHANGE UP
-  CEFEPIME: SIGNIFICANT CHANGE UP
-  CEFOXITIN: SIGNIFICANT CHANGE UP
-  CEFTRIAXONE: SIGNIFICANT CHANGE UP
-  CEFUROXIME: SIGNIFICANT CHANGE UP
-  CIPROFLOXACIN: SIGNIFICANT CHANGE UP
-  ERTAPENEM: SIGNIFICANT CHANGE UP
-  GENTAMICIN: SIGNIFICANT CHANGE UP
-  IMIPENEM: SIGNIFICANT CHANGE UP
-  LEVOFLOXACIN: SIGNIFICANT CHANGE UP
-  MEROPENEM: SIGNIFICANT CHANGE UP
-  NITROFURANTOIN: SIGNIFICANT CHANGE UP
-  PIPERACILLIN/TAZOBACTAM: SIGNIFICANT CHANGE UP
-  TOBRAMYCIN: SIGNIFICANT CHANGE UP
-  TRIMETHOPRIM/SULFAMETHOXAZOLE: SIGNIFICANT CHANGE UP
A1C WITH ESTIMATED AVERAGE GLUCOSE RESULT: 6.3 % — HIGH (ref 4–5.6)
ANION GAP SERPL CALC-SCNC: 4 MMOL/L — LOW (ref 5–17)
BUN SERPL-MCNC: 24 MG/DL — HIGH (ref 7–18)
CALCIUM SERPL-MCNC: 8.3 MG/DL — LOW (ref 8.4–10.5)
CHLORIDE SERPL-SCNC: 112 MMOL/L — HIGH (ref 96–108)
CK SERPL-CCNC: 211 U/L — SIGNIFICANT CHANGE UP (ref 21–215)
CO2 SERPL-SCNC: 27 MMOL/L — SIGNIFICANT CHANGE UP (ref 22–31)
CREAT SERPL-MCNC: 1.65 MG/DL — HIGH (ref 0.5–1.3)
CULTURE RESULTS: ABNORMAL
EGFR: 34 ML/MIN/1.73M2 — LOW
ESTIMATED AVERAGE GLUCOSE: 134 MG/DL — HIGH (ref 68–114)
GLUCOSE BLDC GLUCOMTR-MCNC: 129 MG/DL — HIGH (ref 70–99)
GLUCOSE BLDC GLUCOMTR-MCNC: 219 MG/DL — HIGH (ref 70–99)
GLUCOSE BLDC GLUCOMTR-MCNC: 98 MG/DL — SIGNIFICANT CHANGE UP (ref 70–99)
GLUCOSE SERPL-MCNC: 103 MG/DL — HIGH (ref 70–99)
HCT VFR BLD CALC: 31.3 % — LOW (ref 34.5–45)
HGB BLD-MCNC: 9.8 G/DL — LOW (ref 11.5–15.5)
MCHC RBC-ENTMCNC: 30.2 PG — SIGNIFICANT CHANGE UP (ref 27–34)
MCHC RBC-ENTMCNC: 31.3 GM/DL — LOW (ref 32–36)
MCV RBC AUTO: 96.6 FL — SIGNIFICANT CHANGE UP (ref 80–100)
METHOD TYPE: SIGNIFICANT CHANGE UP
NRBC # BLD: 0 /100 WBCS — SIGNIFICANT CHANGE UP (ref 0–0)
ORGANISM # SPEC MICROSCOPIC CNT: ABNORMAL
ORGANISM # SPEC MICROSCOPIC CNT: ABNORMAL
PLATELET # BLD AUTO: 231 K/UL — SIGNIFICANT CHANGE UP (ref 150–400)
POTASSIUM SERPL-MCNC: 4.1 MMOL/L — SIGNIFICANT CHANGE UP (ref 3.5–5.3)
POTASSIUM SERPL-SCNC: 4.1 MMOL/L — SIGNIFICANT CHANGE UP (ref 3.5–5.3)
RBC # BLD: 3.24 M/UL — LOW (ref 3.8–5.2)
RBC # FLD: 15 % — HIGH (ref 10.3–14.5)
SODIUM SERPL-SCNC: 143 MMOL/L — SIGNIFICANT CHANGE UP (ref 135–145)
SPECIMEN SOURCE: SIGNIFICANT CHANGE UP
WBC # BLD: 5.02 K/UL — SIGNIFICANT CHANGE UP (ref 3.8–10.5)
WBC # FLD AUTO: 5.02 K/UL — SIGNIFICANT CHANGE UP (ref 3.8–10.5)

## 2024-01-20 PROCEDURE — 84300 ASSAY OF URINE SODIUM: CPT

## 2024-01-20 PROCEDURE — 82962 GLUCOSE BLOOD TEST: CPT

## 2024-01-20 PROCEDURE — 85027 COMPLETE CBC AUTOMATED: CPT

## 2024-01-20 PROCEDURE — 85025 COMPLETE CBC W/AUTO DIFF WBC: CPT

## 2024-01-20 PROCEDURE — 80048 BASIC METABOLIC PNL TOTAL CA: CPT

## 2024-01-20 PROCEDURE — 99285 EMERGENCY DEPT VISIT HI MDM: CPT | Mod: 25

## 2024-01-20 PROCEDURE — 83036 HEMOGLOBIN GLYCOSYLATED A1C: CPT

## 2024-01-20 PROCEDURE — 84100 ASSAY OF PHOSPHORUS: CPT

## 2024-01-20 PROCEDURE — 82570 ASSAY OF URINE CREATININE: CPT

## 2024-01-20 PROCEDURE — 36415 COLL VENOUS BLD VENIPUNCTURE: CPT

## 2024-01-20 PROCEDURE — 87086 URINE CULTURE/COLONY COUNT: CPT

## 2024-01-20 PROCEDURE — 76775 US EXAM ABDO BACK WALL LIM: CPT

## 2024-01-20 PROCEDURE — 96372 THER/PROPH/DIAG INJ SC/IM: CPT

## 2024-01-20 PROCEDURE — 83735 ASSAY OF MAGNESIUM: CPT

## 2024-01-20 PROCEDURE — 80053 COMPREHEN METABOLIC PANEL: CPT

## 2024-01-20 PROCEDURE — 84443 ASSAY THYROID STIM HORMONE: CPT

## 2024-01-20 PROCEDURE — 82550 ASSAY OF CK (CPK): CPT

## 2024-01-20 PROCEDURE — 81001 URINALYSIS AUTO W/SCOPE: CPT

## 2024-01-20 PROCEDURE — 87186 SC STD MICRODIL/AGAR DIL: CPT

## 2024-01-20 RX ORDER — GABAPENTIN 400 MG/1
1 CAPSULE ORAL
Qty: 0 | Refills: 0 | DISCHARGE
Start: 2024-01-20

## 2024-01-20 RX ORDER — INSULIN LISPRO 100/ML
14 VIAL (ML) SUBCUTANEOUS
Refills: 0 | DISCHARGE

## 2024-01-20 RX ORDER — LEVOTHYROXINE SODIUM 125 MCG
1 TABLET ORAL
Refills: 0 | DISCHARGE

## 2024-01-20 RX ORDER — VALSARTAN 80 MG/1
1 TABLET ORAL
Refills: 0 | DISCHARGE

## 2024-01-20 RX ORDER — CEFUROXIME AXETIL 250 MG
1 TABLET ORAL
Qty: 10 | Refills: 0
Start: 2024-01-20 | End: 2024-01-24

## 2024-01-20 RX ORDER — NITROFURANTOIN MACROCRYSTAL 50 MG
1 CAPSULE ORAL
Refills: 0 | DISCHARGE
End: 2024-01-20

## 2024-01-20 RX ORDER — AMLODIPINE BESYLATE 2.5 MG/1
1 TABLET ORAL
Qty: 0 | Refills: 0 | DISCHARGE
Start: 2024-01-20

## 2024-01-20 RX ORDER — PREGABALIN 225 MG/1
1 CAPSULE ORAL
Refills: 0 | DISCHARGE

## 2024-01-20 RX ORDER — GABAPENTIN 400 MG/1
1 CAPSULE ORAL
Refills: 0 | DISCHARGE

## 2024-01-20 RX ORDER — MONTELUKAST 4 MG/1
1 TABLET, CHEWABLE ORAL
Refills: 0 | DISCHARGE

## 2024-01-20 RX ORDER — ATORVASTATIN CALCIUM 80 MG/1
1 TABLET, FILM COATED ORAL
Qty: 0 | Refills: 0 | DISCHARGE
Start: 2024-01-20

## 2024-01-20 RX ORDER — INSULIN LISPRO 100/ML
16 VIAL (ML) SUBCUTANEOUS
Refills: 0 | DISCHARGE

## 2024-01-20 RX ORDER — AMLODIPINE BESYLATE 2.5 MG/1
1 TABLET ORAL
Refills: 0 | DISCHARGE

## 2024-01-20 RX ORDER — FUROSEMIDE 40 MG
1 TABLET ORAL
Refills: 0 | DISCHARGE

## 2024-01-20 RX ORDER — CARVEDILOL PHOSPHATE 80 MG/1
1 CAPSULE, EXTENDED RELEASE ORAL
Qty: 0 | Refills: 0 | DISCHARGE
Start: 2024-01-20

## 2024-01-20 RX ORDER — INSULIN LISPRO 100/ML
12 VIAL (ML) SUBCUTANEOUS
Refills: 0 | DISCHARGE

## 2024-01-20 RX ORDER — CARVEDILOL PHOSPHATE 80 MG/1
1 CAPSULE, EXTENDED RELEASE ORAL
Refills: 0 | DISCHARGE

## 2024-01-20 RX ORDER — ATORVASTATIN CALCIUM 80 MG/1
1 TABLET, FILM COATED ORAL
Refills: 0 | DISCHARGE

## 2024-01-20 RX ORDER — LEVOTHYROXINE SODIUM 125 MCG
1 TABLET ORAL
Qty: 0 | Refills: 0 | DISCHARGE
Start: 2024-01-20

## 2024-01-20 RX ORDER — MONTELUKAST 4 MG/1
1 TABLET, CHEWABLE ORAL
Qty: 0 | Refills: 0 | DISCHARGE
Start: 2024-01-20

## 2024-01-20 RX ADMIN — LORATADINE 10 MILLIGRAM(S): 10 TABLET ORAL at 11:25

## 2024-01-20 RX ADMIN — CEFTRIAXONE 100 MILLIGRAM(S): 500 INJECTION, POWDER, FOR SOLUTION INTRAMUSCULAR; INTRAVENOUS at 17:55

## 2024-01-20 RX ADMIN — PREGABALIN 1000 MICROGRAM(S): 225 CAPSULE ORAL at 11:25

## 2024-01-20 RX ADMIN — Medication 150 MICROGRAM(S): at 05:38

## 2024-01-20 RX ADMIN — Medication 5 UNIT(S): at 08:07

## 2024-01-20 RX ADMIN — CARVEDILOL PHOSPHATE 12.5 MILLIGRAM(S): 80 CAPSULE, EXTENDED RELEASE ORAL at 05:38

## 2024-01-20 RX ADMIN — Medication 5 UNIT(S): at 16:38

## 2024-01-20 RX ADMIN — Medication 5 UNIT(S): at 11:44

## 2024-01-20 RX ADMIN — HEPARIN SODIUM 5000 UNIT(S): 5000 INJECTION INTRAVENOUS; SUBCUTANEOUS at 05:39

## 2024-01-20 RX ADMIN — AMLODIPINE BESYLATE 10 MILLIGRAM(S): 2.5 TABLET ORAL at 05:39

## 2024-01-20 NOTE — PROGRESS NOTE ADULT - SUBJECTIVE AND OBJECTIVE BOX
pt seen in icu [  ], reg med floor [ x ], bed [ x ], chair at bedside [   ]  Awake, alert and lying in bed in NAD.    REVIEW OF SYSTEMS:    CONSTITUTIONAL: No weakness, fevers or chills  EYES/ENT: No visual changes;  No vertigo or throat pain   NECK: No pain or stiffness  RESPIRATORY: No cough, wheezing, hemoptysis; No shortness of breath  CARDIOVASCULAR: No chest pain or palpitations  GASTROINTESTINAL: No abdominal or epigastric pain. No nausea, vomiting, or hematemesis; No diarrhea or constipation. No melena or hematochezia.  GENITOURINARY: No dysuria, frequency or hematuria  NEUROLOGICAL: No numbness or weakness  SKIN: No itching, burning, rashes, or lesions   All other review of systems is negative unless indicated above.    Physical Exam    General: WN/WD NAD  Neurology: A&Ox3, nonfocal, STEWART x 4  Respiratory: CTA B/L  CV: RRR, S1S2, no murmurs, rubs or gallops  Abdominal: Soft, NT, ND +BS, Last BM  Extremities: No edema, + peripheral pulses      Allergies  sulfa drugs (Hives)      Health Issues  Abnormal levels of other serum enzymes    HTN (hypertension)    DM (diabetes mellitus)    Renal cancer    HLD (hyperlipidemia)    S/P hysterectomy    History of kidney surgery    H/O bladder repair surgery        Vitals  T(F): 97.7 (01-20-24 @ 05:16), Max: 98.3 (01-19-24 @ 20:36)  HR: 67 (01-20-24 @ 05:16) (67 - 77)  BP: 130/79 (01-20-24 @ 05:16) (130/79 - 151/79)  RR: 16 (01-20-24 @ 05:16) (16 - 18)  SpO2: 95% (01-20-24 @ 05:16) (94% - 98%)  Wt(kg): --  CAPILLARY BLOOD GLUCOSE      POCT Blood Glucose.: 98 mg/dL (20 Jan 2024 07:49)      Labs                          9.8    5.02  )-----------( 231      ( 20 Jan 2024 07:30 )             31.3       01-20    143  |  112<H>  |  24<H>  ----------------------------<  103<H>  4.1   |  27  |  1.65<H>    Ca    8.3<L>      20 Jan 2024 07:30  Phos  3.4     01-19  Mg     1.9     01-19        CARDIAC MARKERS ( 20 Jan 2024 07:30 )  x     / x     / 211 U/L / x     / x      CARDIAC MARKERS ( 19 Jan 2024 07:03 )  x     / x     / 390 U/L / x     / x      CARDIAC MARKERS ( 18 Jan 2024 16:20 )  x     / x     / 378 U/L / x     / x            Radiology Results      Meds    MEDICATIONS  (STANDING):  amLODIPine   Tablet 10 milliGRAM(s) Oral daily  atorvastatin 10 milliGRAM(s) Oral at bedtime  carvedilol 12.5 milliGRAM(s) Oral every 12 hours  cefTRIAXone   IVPB 1000 milliGRAM(s) IV Intermittent every 24 hours  cyanocobalamin 1000 MICROGram(s) Oral daily  gabapentin 300 milliGRAM(s) Oral at bedtime  heparin   Injectable 5000 Unit(s) SubCutaneous every 12 hours  insulin lispro (ADMELOG) corrective regimen sliding scale   SubCutaneous three times a day before meals  insulin lispro Injectable (ADMELOG) 5 Unit(s) SubCutaneous three times a day before meals  lactated ringers. 1000 milliLiter(s) (100 mL/Hr) IV Continuous <Continuous>  lactated ringers. 1000 milliLiter(s) (100 mL/Hr) IV Continuous <Continuous>  levothyroxine 150 MICROGram(s) Oral daily  loratadine 10 milliGRAM(s) Oral daily  montelukast 10 milliGRAM(s) Oral at bedtime      MEDICATIONS  (PRN):         pt seen in icu [  ], reg med floor [ x ], bed [  ], chair at bedside [ x ]  Awake, alert and comfortable OOB to chair in NAD.    REVIEW OF SYSTEMS:    CONSTITUTIONAL: No weakness, fevers or chills  EYES/ENT: No visual changes;  No vertigo or throat pain   NECK: No pain or stiffness  RESPIRATORY: No cough, wheezing, hemoptysis; No shortness of breath  CARDIOVASCULAR: No chest pain or palpitations  GASTROINTESTINAL: No abdominal or epigastric pain. No nausea, vomiting, or hematemesis; No diarrhea or constipation. No melena or hematochezia.  GENITOURINARY: No dysuria, frequency or hematuria  NEUROLOGICAL: No numbness or weakness  SKIN: No itching, burning, rashes, or lesions   All other review of systems is negative unless indicated above.    Physical Exam    General: WN/WD NAD  Neurology: A&Ox3, nonfocal, STEWART x 4  Respiratory: CTA B/L  CV: RRR, S1S2, no murmurs, rubs or gallops  Abdominal: Soft, NT, ND +BS, Last BM  Extremities: No edema, + peripheral pulses      Allergies  sulfa drugs (Hives)      Health Issues  Abnormal levels of other serum enzymes    HTN (hypertension)    DM (diabetes mellitus)    Renal cancer    HLD (hyperlipidemia)    S/P hysterectomy    History of kidney surgery    H/O bladder repair surgery        Vitals  T(F): 97.7 (01-20-24 @ 05:16), Max: 98.3 (01-19-24 @ 20:36)  HR: 67 (01-20-24 @ 05:16) (67 - 77)  BP: 130/79 (01-20-24 @ 05:16) (130/79 - 151/79)  RR: 16 (01-20-24 @ 05:16) (16 - 18)  SpO2: 95% (01-20-24 @ 05:16) (94% - 98%)  Wt(kg): --  CAPILLARY BLOOD GLUCOSE      POCT Blood Glucose.: 98 mg/dL (20 Jan 2024 07:49)      Labs                          9.8    5.02  )-----------( 231      ( 20 Jan 2024 07:30 )             31.3       01-20    143  |  112<H>  |  24<H>  ----------------------------<  103<H>  4.1   |  27  |  1.65<H>    Ca    8.3<L>      20 Jan 2024 07:30  Phos  3.4     01-19  Mg     1.9     01-19        CARDIAC MARKERS ( 20 Jan 2024 07:30 )  x     / x     / 211 U/L / x     / x      CARDIAC MARKERS ( 19 Jan 2024 07:03 )  x     / x     / 390 U/L / x     / x      CARDIAC MARKERS ( 18 Jan 2024 16:20 )  x     / x     / 378 U/L / x     / x            Radiology Results      Meds    MEDICATIONS  (STANDING):  amLODIPine   Tablet 10 milliGRAM(s) Oral daily  atorvastatin 10 milliGRAM(s) Oral at bedtime  carvedilol 12.5 milliGRAM(s) Oral every 12 hours  cefTRIAXone   IVPB 1000 milliGRAM(s) IV Intermittent every 24 hours  cyanocobalamin 1000 MICROGram(s) Oral daily  gabapentin 300 milliGRAM(s) Oral at bedtime  heparin   Injectable 5000 Unit(s) SubCutaneous every 12 hours  insulin lispro (ADMELOG) corrective regimen sliding scale   SubCutaneous three times a day before meals  insulin lispro Injectable (ADMELOG) 5 Unit(s) SubCutaneous three times a day before meals  lactated ringers. 1000 milliLiter(s) (100 mL/Hr) IV Continuous <Continuous>  lactated ringers. 1000 milliLiter(s) (100 mL/Hr) IV Continuous <Continuous>  levothyroxine 150 MICROGram(s) Oral daily  loratadine 10 milliGRAM(s) Oral daily  montelukast 10 milliGRAM(s) Oral at bedtime      MEDICATIONS  (PRN):         pt seen in icu [  ], reg med floor [ x ], bed [  ], chair at bedside [ x ]  Awake, alert and comfortable OOB to chair in NAD. Feeling well    REVIEW OF SYSTEMS:    CONSTITUTIONAL: No weakness, fevers or chills  EYES/ENT: No visual changes;  No vertigo or throat pain   NECK: No pain or stiffness  RESPIRATORY: No cough, wheezing, hemoptysis; No shortness of breath  CARDIOVASCULAR: No chest pain or palpitations  GASTROINTESTINAL: No abdominal or epigastric pain. No nausea, vomiting, or hematemesis; No diarrhea or constipation. No melena or hematochezia.  GENITOURINARY: No dysuria, frequency or hematuria  NEUROLOGICAL: No numbness or weakness  SKIN: No itching, burning, rashes, or lesions   All other review of systems is negative unless indicated above.    Physical Exam    General: WN/WD NAD  Neurology: A&Ox3, nonfocal, STEWART x 4  Respiratory: CTA B/L  CV: RRR, S1S2, no murmurs, rubs or gallops  Abdominal: Soft, NT, ND +BS, Last BM  Extremities: No edema, + peripheral pulses      Allergies  sulfa drugs (Hives)      Health Issues  Abnormal levels of other serum enzymes    HTN (hypertension)    DM (diabetes mellitus)    Renal cancer    HLD (hyperlipidemia)    S/P hysterectomy    History of kidney surgery    H/O bladder repair surgery        Vitals  T(F): 97.7 (01-20-24 @ 05:16), Max: 98.3 (01-19-24 @ 20:36)  HR: 67 (01-20-24 @ 05:16) (67 - 77)  BP: 130/79 (01-20-24 @ 05:16) (130/79 - 151/79)  RR: 16 (01-20-24 @ 05:16) (16 - 18)  SpO2: 95% (01-20-24 @ 05:16) (94% - 98%)  Wt(kg): --  CAPILLARY BLOOD GLUCOSE      POCT Blood Glucose.: 98 mg/dL (20 Jan 2024 07:49)      Labs                          9.8    5.02  )-----------( 231      ( 20 Jan 2024 07:30 )             31.3       01-20    143  |  112<H>  |  24<H>  ----------------------------<  103<H>  4.1   |  27  |  1.65<H>    Ca    8.3<L>      20 Jan 2024 07:30  Phos  3.4     01-19  Mg     1.9     01-19    Creatine Kinase, Serum in AM (01.20.24 @ 07:30)   Creatine Kinase, Serum: 211 U/L        CARDIAC MARKERS ( 20 Jan 2024 07:30 )  x     / x     / 211 U/L / x     / x      CARDIAC MARKERS ( 19 Jan 2024 07:03 )  x     / x     / 390 U/L / x     / x      CARDIAC MARKERS ( 18 Jan 2024 16:20 )  x     / x     / 378 U/L / x     / x            Radiology Results      Meds    MEDICATIONS  (STANDING):  amLODIPine   Tablet 10 milliGRAM(s) Oral daily  atorvastatin 10 milliGRAM(s) Oral at bedtime  carvedilol 12.5 milliGRAM(s) Oral every 12 hours  cefTRIAXone   IVPB 1000 milliGRAM(s) IV Intermittent every 24 hours  cyanocobalamin 1000 MICROGram(s) Oral daily  gabapentin 300 milliGRAM(s) Oral at bedtime  heparin   Injectable 5000 Unit(s) SubCutaneous every 12 hours  insulin lispro (ADMELOG) corrective regimen sliding scale   SubCutaneous three times a day before meals  insulin lispro Injectable (ADMELOG) 5 Unit(s) SubCutaneous three times a day before meals  lactated ringers. 1000 milliLiter(s) (100 mL/Hr) IV Continuous <Continuous>  lactated ringers. 1000 milliLiter(s) (100 mL/Hr) IV Continuous <Continuous>  levothyroxine 150 MICROGram(s) Oral daily  loratadine 10 milliGRAM(s) Oral daily  montelukast 10 milliGRAM(s) Oral at bedtime      MEDICATIONS  (PRN):

## 2024-01-20 NOTE — PROGRESS NOTE ADULT - PROBLEM SELECTOR PLAN 2
Scr 2.0, (Baseline 1.2)  ISO Rhabdo  Continue IV fluids   Hold home ARB and Lasix ISO JAX  Dr. Wilson, Nephrology, following  Recommendations appreciated
Avoid nephrotoxin drugs  monitor BMP  Renal follow up
Avoid nephrotoxin drugs  monitor BMP  Renal follow up
Scr 2.0, (Baseline 1.2)  ISO Rhabdo  Continue IV fluids   Hold home ARB and Lasix ISO JAX  Dr. Wilson, Nephrology, following  Recommendations appreciated

## 2024-01-20 NOTE — DISCHARGE NOTE NURSING/CASE MANAGEMENT/SOCIAL WORK - PATIENT PORTAL LINK FT
You can access the FollowMyHealth Patient Portal offered by [facility] by registering at the following website: [email or web address]. By joining [facility]’s FollowMyHealth portal, you will also be able to view your health information using other applications (apps) compatible with our system.

## 2024-01-20 NOTE — DISCHARGE NOTE PROVIDER - NSDCMRMEDTOKEN_GEN_ALL_CORE_FT
amLODIPine 10 mg oral tablet: 1 tab(s) orally once a day  atorvastatin 10 mg oral tablet: 1 tab(s) orally once a day (at bedtime)  Cabometyx 60 mg oral tablet: 1 tab(s) orally once a day  carvedilol 12.5 mg oral tablet: 1 tab(s) orally every 12 hours  cetirizine 10 mg oral tablet: 1 tab(s) orally once a day  cholecalciferol 1250 mcg (50,000 intl units) oral capsule: 1 cap(s) orally every 7 days  famotidine 20 mg oral tablet: 1 tab(s) orally once a day  gabapentin 300 mg oral capsule: 1 cap(s) orally once a day (at bedtime)  levothyroxine 150 mcg (0.15 mg) oral tablet: 1 tab(s) orally once a day  montelukast 10 mg oral tablet: 1 tab(s) orally once a day (at bedtime)  Tresiba 100 units/mL subcutaneous solution: 26 unit(s) subcutaneous 2 times a day  Vascepa 1 g oral capsule: 2 cap(s) orally 2 times a day   amLODIPine 10 mg oral tablet: 1 tab(s) orally once a day  atorvastatin 10 mg oral tablet: 1 tab(s) orally once a day (at bedtime)  Cabometyx 60 mg oral tablet: 1 tab(s) orally once a day  carvedilol 12.5 mg oral tablet: 1 tab(s) orally every 12 hours  cefuroxime 250 mg oral tablet: 1 tab(s) orally 2 times a day  cetirizine 10 mg oral tablet: 1 tab(s) orally once a day  cholecalciferol 1250 mcg (50,000 intl units) oral capsule: 1 cap(s) orally every 7 days  famotidine 20 mg oral tablet: 1 tab(s) orally once a day  gabapentin 300 mg oral capsule: 1 cap(s) orally once a day (at bedtime)  levothyroxine 150 mcg (0.15 mg) oral tablet: 1 tab(s) orally once a day  montelukast 10 mg oral tablet: 1 tab(s) orally once a day (at bedtime)  Tresiba 100 units/mL subcutaneous solution: 26 unit(s) subcutaneous 2 times a day  Vascepa 1 g oral capsule: 2 cap(s) orally 2 times a day

## 2024-01-20 NOTE — PROGRESS NOTE ADULT - PROBLEM SELECTOR PROBLEM 2
JAX (acute kidney injury)

## 2024-01-20 NOTE — PROGRESS NOTE ADULT - ASSESSMENT
Patient is a 63y old  Female who is  from home, living alone with CDPAP program (mohamud Collins is a caregiver 5hrs/ day 5days/wk) with a significant PMHx of HTN, DM and metastatic renal CA (follows with [facility] Dr. Benigno ramos since 2008, mets to breast and pancreas, on chemo x about a year every 2 weeks), now sent in to the ER from outpatient PCP office Dr. Olivera for evaluation of elevated CK levels and with weakness. Patient reports that for the last 3 months she has weakness in the upper and lower extremities. Patient homero reports increased urinary frequency and was found to have a UTI and was prescribed Macrobid which she is on Day 8 of a ten day course. On admission, she found to have no fever, no leukocytosis but mild positive urine analysis. The Culture grew E.coli , hence, The ID consult requested to assist with antibiotic management.    # UTI - Urine culture grew E.coli  # Rhabdomyolitis      would recommend:    1. Continue Ceftriaxone while inpatient   2. May change to oral Cefuroxime on discharge to complete the course  3. Monitor kidney function and IVF    d/w Covering Deonna SALCIDO and patient , family at the bed side    Attending Attestation:    Spent more than 45 minutes on total encounter, more than 50 % of the visit was spent counseling and/or coordinating care by the Attending physician.      
 62 y/o female with renal cell carcinoma mets to pancrease and breast. on treatment with IO failed and now on Cabometyx with good response and on remission. pt admitted for body pain/weakness and was found to have rhabdo/renal failure. also has diarrhea.    rhabdo with elevated CPK  improved   pain better   ? cause by medication   recommend hold Cabometyx    GI consult for diarrhea and renal consult for renal failure appreciated    case d/w Dr. Pelaez. will f/u.  no objection for d/c home if primary care cleared her   
63 years old with history of Left nephrectomy admitted for UTI .  E Coli UTI - palced on Ceftriaxone.    JAX- Unknown recent renal function, renal function improved after DC  ARB. .  ? 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
Patient is a 64 yo Slovak speaking female  from home, living alone with CDPAP program (mohamud Collins is a caregiver 5hrs/ day 5days/wk) with a significant PMHx of HTN, DM and metastatic renal CA (follows with [facility] Dr. Benigno ramos since 2008, mets to breast and pancreas, on chemo x about a year every 2 weeks), presents from outpatient PCP office Dr. Olivera with elevate CK levels and with weakness. Upon evaluation in ED, patient afebrile and hemodynamically stable. Physical examination showing 5/5 strength in upper and lower extremities. Labs significant for hgb 11.6, CK of 1373 and Scr of 2.38. UA noted to be consistent with infection. Patient admitted to medicine for rhabdomyolysis and JAX. 
Patient is a 62 yo Croatian speaking female  from home, living alone with CDPAP program (mohamud Collins is a caregiver 5hrs/ day 5days/wk) with a significant PMHx of HTN, DM and metastatic renal CA (follows with [facility] Dr. Benigno ramso since 2008, mets to breast and pancreas, on chemo x about a year every 2 weeks), presents from outpatient PCP office Dr. Olivera with elevate CK levels and with weakness. Upon evaluation in ED, patient afebrile and hemodynamically stable. Physical examination showing 5/5 strength in upper and lower extremities. Labs significant for hgb 11.6, CK of 1373 and Scr of 2.38. UA noted to be consistent with infection. Patient admitted to medicine for rhabdomyolysis and JAX.

## 2024-01-20 NOTE — DISCHARGE NOTE PROVIDER - HOSPITAL COURSE
Patient is a 64 yo Georgian speaking female  from home, living alone with CDPAP program (mohamud Collins is a caregiver 5hrs/ day 5days/wk) with a significant PMHx of HTN, DM and metastatic renal CA (follows with [facility] Dr. Benigno ramos since 2008, mets to breast and pancreas, on chemo x about a year every 2 weeks), presents from outpatient PCP office Dr. Olivera with elevate CK levels and with weakness. Upon evaluation in ED, patient afebrile and hemodynamically stable. Physical examination showing 5/5 strength in upper and lower extremities. Labs significant for hgb 11.6, CK of 1373 and Scr of 2.38. UA noted to be consistent with infection. Patient admitted to medicine for rhabdomyolysis and JAX. , Renal function improved when ARB and diuretics were stopped and was placed on IV fluids.      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  JAX , possible sepsis , meds, hypovolemia , renal function improved.  Nitrofurantoin dc'd in setting of low EGFR  Started on ceftriaxone-> Patient to take ceftin 250 mg BID x 5 days   ID Dr. Rojas following  Nephro Dr. Epps following     #Problem: Rhabdomyolysis.   ·  Plan: CK 1373 -> 811>378> 211  CK levels resolved   Fall precautions.    # Problem: JAX (acute kidney injury).   ·  Plan: Scr 2.0, (Baseline 1.2)  ISO Rhabdo  Hold home ARB and Lasix ISO JAX  Trending down  Dr. Wilson, Nephrology, following             Patient is a 62 yo Grenadian speaking female  from home, living alone with CDPAP program (mohamud Collins is a caregiver 5hrs/ day 5days/wk) with a significant PMHx of HTN, DM and metastatic renal CA (follows with [facility] Dr. Benigno ramos since 2008, mets to breast and pancreas, on chemo x about a year every 2 weeks), presents from outpatient PCP office Dr. Olivera with elevate CK levels and with weakness. Upon evaluation in ED, patient afebrile and hemodynamically stable. Physical examination showing 5/5 strength in upper and lower extremities. Labs significant for hgb 11.6, CK of 1373 and Scr of 2.38. UA noted to be consistent with infection. Patient admitted to medicine for rhabdomyolysis and JAX.  Renal function improved when ARB and diuretics were stopped and was placed on IV fluids. Rhabdomyolysis improved with IV fluids. Patient to be discharged with ceftin 25- mg BID X 5 days to complete antibiotic therapy. Now optimized for discharge.       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  JAX , possible sepsis , meds, hypovolemia , renal function improved.  Nitrofurantoin dc'd in setting of low EGFR  Started on ceftriaxone-> Patient to take ceftin 250 mg BID x 5 days   ID Dr. Rojas following  Nephro Dr. Epps following     #Problem: Rhabdomyolysis.   ·  Plan: CK 1373 -> 811>378> 211  CK levels resolved   Fall precautions.    # Problem: JAX (acute kidney injury).   ·  Plan: Scr 2.0, (Baseline 1.2)  ISO Rhabdo  Hold home ARB and Lasix ISO JAX  Trending down  Dr. Wilson, Nephrology, following      Given the clinical course, decision was made to discharge the patient with outpatient follow up.   Discharge plan discussed with attending.   This is only a brief summary for the whole hospital course, please follow EMR.

## 2024-01-20 NOTE — PROGRESS NOTE ADULT - SUBJECTIVE AND OBJECTIVE BOX
Problem List:  Patient is a 62 yo Citizen of [place] speaking female  from home, metastatic renal cancer dx since 2008, mets to breast and pancreas, on chemo x about a year every 2 weeks), presents from outpatient PCP office Dr. Olivera with elevate CK levels and with weakness.  Admitted for UTI. >100,000 CFU/ml Escherichia coli (01.17.24 @ 14:47)   Patient is not aware of her renal function, was never told of it.   History of fluid retention on diuretics.  Creatine Kinase, Serum: 1373 U/L (01.17.24 @ 14:47) Creatine Kinase, Serum: 811 U/L (01.18.24 @ 04:30)   Sodium: 142 mmol/L      PAST MEDICAL & SURGICAL HISTORY:  HTN (hypertension)      DM (diabetes mellitus)      Renal cancer      HLD (hyperlipidemia)      S/P hysterectomy      History of kidney surgery      H/O bladder repair surgery          sulfa drugs (Hives)      MEDICATIONS  (STANDING):  amLODIPine   Tablet 10 milliGRAM(s) Oral daily  atorvastatin 10 milliGRAM(s) Oral at bedtime  carvedilol 12.5 milliGRAM(s) Oral every 12 hours  cefTRIAXone   IVPB 1000 milliGRAM(s) IV Intermittent every 24 hours  cyanocobalamin 1000 MICROGram(s) Oral daily  gabapentin 300 milliGRAM(s) Oral at bedtime  heparin   Injectable 5000 Unit(s) SubCutaneous every 12 hours  insulin lispro (ADMELOG) corrective regimen sliding scale   SubCutaneous three times a day before meals  insulin lispro Injectable (ADMELOG) 5 Unit(s) SubCutaneous three times a day before meals  lactated ringers. 1000 milliLiter(s) (100 mL/Hr) IV Continuous <Continuous>  lactated ringers. 1000 milliLiter(s) (100 mL/Hr) IV Continuous <Continuous>  levothyroxine 150 MICROGram(s) Oral daily  loratadine 10 milliGRAM(s) Oral daily  montelukast 10 milliGRAM(s) Oral at bedtime    MEDICATIONS  (PRN):                            9.8    5.02  )-----------( 231      ( 20 Jan 2024 07:30 )             31.3     01-20    143  |  112<H>  |  24<H>  ----------------------------<  103<H>  4.1   |  27  |  1.65<H>    Ca    8.3<L>      20 Jan 2024 07:30  Phos  3.4     01-19  Mg     1.9     01-19          Sodium, Random Urine: 37 mmol/L (01-18 @ 08:29)  Creatinine, Random Urine: 79 mg/dL (01-18 @ 08:29)      REVIEW OF SYSTEMS:  General: no fever no chills, no weight loss.    RESPIRATORY: No cough, wheezing, hemoptysis; No shortness of breath  CARDIOVASCULAR: No chest pain or palpitations. No Edema  GASTROINTESTINAL: No abdominal or epigastric pain. No nausea, vomiting. No diarrhea or constipation. No melena.  GENITOURINARY: No dysuria, frequency, foamy urine, urinary urgency, incontinence or hematuria  NEUROLOGICAL: No numbness or weakness, no tremor , no dizziness.   Muscle skeletal : no joint pain and no swelling of joints and limbs.  SKIN: No itching, burning, rashes.        VITALS:  T(F): 98.4 (01-20-24 @ 12:43), Max: 98.4 (01-20-24 @ 12:43)  HR: 876 (01-20-24 @ 12:43)  BP: 144/85 (01-20-24 @ 12:43)  RR: 18 (01-20-24 @ 12:43)  SpO2: 99% (01-20-24 @ 12:43)  Wt(kg): --      PHYSICAL EXAM:  Constitutional: well developed, no diaphoresis, no distress.  Neck: No JVD, no carotid bruit, supple, no adenopathy  Respiratory: air entrance B/L, no wheezes, rales or rhonchi  Cardiovascular: S1, S2, RRR, no pericardial rub, no murmur  Abdomen: BS+, soft, no tenderness, no bruit  Pelvis: bladder nondistended  Extremities: No cyanosis or clubbing. No peripheral edema.

## 2024-01-20 NOTE — DISCHARGE NOTE NURSING/CASE MANAGEMENT/SOCIAL WORK - NSDCPEFALRISK_GEN_ALL_CORE
For information on Fall & Injury Prevention, visit: [email or web address] OR  [email or web address] OR  https://www.cdc.gov/steadi/patient.html

## 2024-01-20 NOTE — DISCHARGE NOTE PROVIDER - NSDCCPCAREPLAN_GEN_ALL_CORE_FT
PRINCIPAL DISCHARGE DIAGNOSIS  Diagnosis: Rhabdomyolysis  Assessment and Plan of Treatment:       SECONDARY DISCHARGE DIAGNOSES  Diagnosis: Acute kidney injury superimposed on CKD  Assessment and Plan of Treatment:     Diagnosis: UTI (urinary tract infection)  Assessment and Plan of Treatment:     Diagnosis: HLD (hyperlipidemia)  Assessment and Plan of Treatment:     Diagnosis: HTN (hypertension)  Assessment and Plan of Treatment:     Diagnosis: Renal cancer  Assessment and Plan of Treatment:     Diagnosis: DM (diabetes mellitus)  Assessment and Plan of Treatment:      PRINCIPAL DISCHARGE DIAGNOSIS  Diagnosis: Rhabdomyolysis  Assessment and Plan of Treatment: Rhabdomyolysis is a condition where injured muscles release harmful substances into the bloodstream. These substances include potassium, phosphate, creatinine kinase, and myoglobin. Large amounts of these substances may damage your kidneys and other organs. You were given IV fluids. This was caused likely due to your chemotherapy.   DISCHARGE INSTRUCTIONS:  Call 911 if:  You have chest pain.  Your heart is beating faster than usual or has a strange rhythm.  Return to the emergency department if:  Your urine is dark or tea-colored or has blood in it.  You have pain, swelling, or weakness in your arms or legs that does not go away or gets worse.  You are urinating less than usual or not able to urinate.      SECONDARY DISCHARGE DIAGNOSES  Diagnosis: Renal cancer  Assessment and Plan of Treatment: You have a history of renal cancer. Follows with our colleague Dr. Pelaez, s/p left nephrectomy, currently on cabometyx. Please follow up with your heme oncologist.    Diagnosis: Acute kidney injury superimposed on CKD  Assessment and Plan of Treatment: You creatinine was elevated due to dehydration and due to rhabdomylosis   Creatinine improved with IV fluids   Avoid taking (NSAIDs) - (ex: Ibuprofen, Advil, Celebrex, Naprosyn)  Avoid taking any nephrotoxic agents (can harm kidneys) - Intravenous contrast for diagnostic testing, combination cold medications.  Have all medications adjusted for your renal function by your Health Care Provider.  Blood pressure control is important.  Take all medication as prescribed.    Diagnosis: UTI (urinary tract infection)  Assessment and Plan of Treatment: You were found to have a uti. You were treated with IV antibiotics. Please continue with ceftin 250 mg twice daily x 5 days    Diagnosis: HLD (hyperlipidemia)  Assessment and Plan of Treatment: You have a history of HLD. Please continue with atorvastatin 10 mg at bedtime. Follow up with your primary to do a lipid panel.    Diagnosis: HTN (hypertension)  Assessment and Plan of Treatment: Please continue with amlodipine 10 mg daily    Diagnosis: DM (diabetes mellitus)  Assessment and Plan of Treatment: Please continue using treba home medication     PRINCIPAL DISCHARGE DIAGNOSIS  Diagnosis: Rhabdomyolysis  Assessment and Plan of Treatment: Rhabdomyolysis is a condition where injured muscles release harmful substances into the bloodstream. These substances include potassium, phosphate, creatinine kinase, and myoglobin. Large amounts of these substances may damage your kidneys and other organs. You were given IV fluids. This was caused likely due to your chemotherapy.   DISCHARGE INSTRUCTIONS:  Call 911 if:  You have chest pain.  Your heart is beating faster than usual or has a strange rhythm.  Return to the emergency department if:  Your urine is dark or tea-colored or has blood in it.  You have pain, swelling, or weakness in your arms or legs that does not go away or gets worse.  You are urinating less than usual or not able to urinate.      SECONDARY DISCHARGE DIAGNOSES  Diagnosis: Renal cancer  Assessment and Plan of Treatment: You have a history of renal cancer. Follows with our colleague Dr. Pelaez, s/p left nephrectomy, currently on cabometyx. Please follow up with your heme oncologist.    Diagnosis: Acute kidney injury superimposed on CKD  Assessment and Plan of Treatment: You creatinine was elevated due to dehydration and due to rhabdomylosis   Creatinine improved with IV fluids   Avoid taking (NSAIDs) - (ex: Ibuprofen, Advil, Celebrex, Naprosyn)  Avoid taking any nephrotoxic agents (can harm kidneys) - Intravenous contrast for diagnostic testing, combination cold medications.  Have all medications adjusted for your renal function by your Health Care Provider.  Blood pressure control is important.  Take all medication as prescribed.  Please follow up with Dr. Epps to check your renal function       Diagnosis: UTI (urinary tract infection)  Assessment and Plan of Treatment: You were found to have a uti. You were treated with IV antibiotics. Please continue with ceftin 250 mg twice daily x 5 days    Diagnosis: HLD (hyperlipidemia)  Assessment and Plan of Treatment: You have a history of HLD. Please continue with atorvastatin 10 mg at bedtime. Follow up with your primary to do a lipid panel.    Diagnosis: HTN (hypertension)  Assessment and Plan of Treatment: Please continue with amlodipine 10 mg daily    Diagnosis: DM (diabetes mellitus)  Assessment and Plan of Treatment: Please continue using tresiba home medication

## 2024-01-20 NOTE — PROGRESS NOTE ADULT - REASON FOR ADMISSION
Mild rhabdo, JAX

## 2024-01-20 NOTE — PROGRESS NOTE ADULT - PROVIDER SPECIALTY LIST ADULT
Heme/Onc
Infectious Disease
Nephrology
Internal Medicine

## 2024-01-20 NOTE — PROGRESS NOTE ADULT - SUBJECTIVE AND OBJECTIVE BOX
pt seen and examined. pt felt much better and diarrhea and pain subside   ICU Vital Signs Last 24 Hrs  T(C): 36.9 (20 Jan 2024 12:43), Max: 36.9 (20 Jan 2024 12:43)  T(F): 98.4 (20 Jan 2024 12:43), Max: 98.4 (20 Jan 2024 12:43)  HR: 876 (20 Jan 2024 12:43) (67 - 876)  BP: 144/85 (20 Jan 2024 12:43) (130/79 - 144/85)  BP(mean): --  ABP: --  ABP(mean): --  RR: 18 (20 Jan 2024 12:43) (16 - 18)  SpO2: 99% (20 Jan 2024 12:43) (95% - 99%)  general AAox3 nad   other no changes   O2 Parameters below as of 20 Jan 2024 12:43  Patient On (Oxygen Delivery Method): room air                        9.8    5.02  )-----------( 231      ( 20 Jan 2024 07:30 )             31.3   01-20    143  |  112<H>  |  24<H>  ----------------------------<  103<H>  4.1   |  27  |  1.65<H>    Ca    8.3<L>      20 Jan 2024 07:30  Phos  3.4     01-19  Mg     1.9     01-19    MEDICATIONS  (STANDING):  amLODIPine   Tablet 10 milliGRAM(s) Oral daily  atorvastatin 10 milliGRAM(s) Oral at bedtime  carvedilol 12.5 milliGRAM(s) Oral every 12 hours  cefTRIAXone   IVPB 1000 milliGRAM(s) IV Intermittent every 24 hours  cyanocobalamin 1000 MICROGram(s) Oral daily  gabapentin 300 milliGRAM(s) Oral at bedtime  heparin   Injectable 5000 Unit(s) SubCutaneous every 12 hours  insulin lispro (ADMELOG) corrective regimen sliding scale   SubCutaneous three times a day before meals  insulin lispro Injectable (ADMELOG) 5 Unit(s) SubCutaneous three times a day before meals  lactated ringers. 1000 milliLiter(s) (100 mL/Hr) IV Continuous <Continuous>  lactated ringers. 1000 milliLiter(s) (100 mL/Hr) IV Continuous <Continuous>  levothyroxine 150 MICROGram(s) Oral daily  loratadine 10 milliGRAM(s) Oral daily  montelukast 10 milliGRAM(s) Oral at bedtime    MEDICATIONS  (PRN):

## 2024-01-20 NOTE — DISCHARGE NOTE PROVIDER - PROVIDER TOKENS
PROVIDER:[TOKEN:[[id number]],FOLLOWUP:[1 week]],PROVIDER:[TOKEN:[[id number]],FOLLOWUP:[1 week]] PROVIDER:[TOKEN:[[id number]],FOLLOWUP:[1 week]],PROVIDER:[TOKEN:[[id number]],FOLLOWUP:[1 week]],PROVIDER:[TOKEN:[[id number]],FOLLOWUP:[1 week]]

## 2024-01-20 NOTE — DISCHARGE NOTE PROVIDER - CARE PROVIDERS DIRECT ADDRESSES
[email or web address],[email or web address]

## 2024-01-20 NOTE — PROGRESS NOTE ADULT - SUBJECTIVE AND OBJECTIVE BOX
Patient is seen and examined at the bed side, is afebrile. She is doing better, no new complaints.       REVIEW OF SYSTEMS: All other review systems are negative      ALLERGIES: sulfa drugs (Hives)      Vital Signs Last 24 Hrs  T(C): 36.9 (20 Jan 2024 12:43), Max: 36.9 (20 Jan 2024 12:43)  T(F): 98.4 (20 Jan 2024 12:43), Max: 98.4 (20 Jan 2024 12:43)  HR: 876 (20 Jan 2024 12:43) (67 - 876)  BP: 144/85 (20 Jan 2024 12:43) (130/79 - 151/79)  BP(mean): --  RR: 18 (20 Jan 2024 12:43) (16 - 18)  SpO2: 99% (20 Jan 2024 12:43) (94% - 99%)    Parameters below as of 20 Jan 2024 12:43  Patient On (Oxygen Delivery Method): room air        PHYSICAL EXAM:  GENERAL: Not in distress   CHEST/LUNG:  Not using accessory muscles   HEART: s1 and s2 present  ABDOMEN:  Nontender and  Nondistended  EXTREMITIES: No pedal  edema  CNS: Awake and Alert      LABS:                        9.8    5.02  )-----------( 231      ( 20 Jan 2024 07:30 )             31.3                           10.1   5.41  )-----------( 236      ( 19 Jan 2024 07:03 )             31.7       01-20    143  |  112<H>  |  24<H>  ----------------------------<  103<H>  4.1   |  27  |  1.65<H>    Ca    8.3<L>      20 Jan 2024 07:30  Phos  3.4     01-19  Mg     1.9     01-19      01-19    140  |  109<H>  |  27<H>  ----------------------------<  155<H>  4.6   |  22  |  1.72<H>    Ca    8.3<L>      19 Jan 2024 07:03  Phos  3.4     01-19  Mg     1.9     01-19    TPro  5.7<L>  /  Alb  2.0<L>  /  TBili  0.5  /  DBili  x   /  AST  48<H>  /  ALT  43  /  AlkPhos  85  01-18      CAPILLARY BLOOD GLUCOSE  POCT Blood Glucose.: 183 mg/dL (19 Jan 2024 16:45)  POCT Blood Glucose.: 118 mg/dL (19 Jan 2024 11:33)  POCT Blood Glucose.: 125 mg/dL (19 Jan 2024 08:22)        MEDICATIONS  (STANDING):    amLODIPine   Tablet 10 milliGRAM(s) Oral daily  atorvastatin 10 milliGRAM(s) Oral at bedtime  carvedilol 12.5 milliGRAM(s) Oral every 12 hours  cefTRIAXone   IVPB 1000 milliGRAM(s) IV Intermittent every 24 hours  cyanocobalamin 1000 MICROGram(s) Oral daily  gabapentin 300 milliGRAM(s) Oral at bedtime  heparin   Injectable 5000 Unit(s) SubCutaneous every 12 hours  insulin lispro (ADMELOG) corrective regimen sliding scale   SubCutaneous three times a day before meals  insulin lispro Injectable (ADMELOG) 5 Unit(s) SubCutaneous three times a day before meals  lactated ringers. 1000 milliLiter(s) (100 mL/Hr) IV Continuous <Continuous>  lactated ringers. 1000 milliLiter(s) (100 mL/Hr) IV Continuous <Continuous>  levothyroxine 150 MICROGram(s) Oral daily  loratadine 10 milliGRAM(s) Oral daily  montelukast 10 milliGRAM(s) Oral at bedtime        RADIOLOGY & ADDITIONAL TESTS:    1/18/24: US Renal (01.18.24 @ 10:06) Left nephrectomy.  No right renal mass, hydronephrosis or calculus is   visualized sonographically.      MICROBIOLOGY DATA:    Culture - Urine (01.17.24 @ 14:47)   - Amoxicillin/Clavulanic Acid: S <=8/4  - Ampicillin: R >16 These ampicillin results predict results for amoxicillin  - Ampicillin/Sulbactam: I 16/8  - Aztreonam: S <=4  - Cefazolin: S <=2 For uncomplicated UTI with K. pneumoniae, E. coli, or P. mirablis: STONEY <=16 is sensitive and STONEY >=32 is resistant. This also predicts results for oral agents cefaclor, cefdinir, cefpodoxime, cefprozil, cefuroxime axetil, cephalexin and locarbef for uncomplicated UTI. Note that some isolates may be susceptible to these agents while testing resistant to cefazolin.  - Cefepime: S <=2  - Cefuroxime: S <=4  - Ciprofloxacin: I 0.5  - Ertapenem: S <=0.5  - Gentamicin: S <=2  - Imipenem: S <=1  - Levofloxacin: S <=0.5  - Meropenem: S <=1  - Nitrofurantoin: R >64 Should not be used to treat pyelonephritis  - Cefoxitin: S <=8  - Ceftriaxone: S <=1  - Piperacillin/Tazobactam: S <=8  - Tobramycin: S <=2  - Trimethoprim/Sulfamethoxazole: R >2/38  Specimen Source: Clean Catch Clean Catch (Midstream)  Culture Results:   >100,000 CFU/ml Escherichia coli  Organism Identification: Escherichia coli  Organism: Escherichia coli  Method Type: STONEY    Culture - Urine (01.17.24 @ 14:47)   Specimen Source: Clean Catch Clean Catch (Midstream)  Culture Results:   >100,000 CFU/ml Escherichia coli      Urinalysis + Microscopic Examination (01.17.24 @ 14:47)   pH Urine: 6.0  Urine Appearance: Clear  Color: Yellow  Specific Gravity: 1.009  Protein, Urine: 100 mg/dL  Glucose Qualitative, Urine: 250 mg/dL  Ketone - Urine: Negative mg/dL  Blood, Urine: Small  Bilirubin: Negative  Urobilinogen: 0.2 mg/dL  Leukocyte Esterase Concentration: Trace  Nitrite: Negative  White Blood Cell - Urine: 8 /HPF  Red Blood Cell - Urine: 15 /HPF  Bacteria: Moderate /HPF  Hyaline Casts: Present  Squamous Epithelial Cells: Present

## 2024-01-20 NOTE — PROGRESS NOTE ADULT - PROBLEM SELECTOR PLAN 1
CK 1373 -> 811>378  Continue IV fluids   Trend CK  Trend BMP, Mag & Phos   Fall precautions
CK 1373 -> 811  Continue IV fluids   Trend CK  Trend BMP, Mag & Phos   Fall precautions
monitor CPK  IVF  DC statin
monitor CPK  IVF  DC statin

## 2024-01-24 ENCOUNTER — TRANSCRIPTION ENCOUNTER (OUTPATIENT)
Age: 64
End: 2024-01-24

## 2024-04-26 ENCOUNTER — OUTPATIENT (OUTPATIENT)
Dept: OUTPATIENT SERVICES | Facility: HOSPITAL | Age: 64
LOS: 1 days | Discharge: HOME | End: 2024-04-26

## 2024-04-26 DIAGNOSIS — Z98.890 OTHER SPECIFIED POSTPROCEDURAL STATES: Chronic | ICD-10-CM

## 2024-04-26 DIAGNOSIS — Z90.710 ACQUIRED ABSENCE OF BOTH CERVIX AND UTERUS: Chronic | ICD-10-CM

## 2024-04-29 NOTE — PROGRESS NOTE ADULT - PROBLEM SELECTOR PROBLEM 5
OFFICE VISIT      Patient: Ankur Flores Date of Service: 2024   : 1996 MRN: [id number]     SUBJECTIVE:     Chief Complaint   Patient presents with    Office Visit    Video Visit    Sore Throat     Since Saturday afternoon.    Headache       This visit is being performed virtually via Non-integrated Video Visit. Consent to treat includes permission to submit charges to the patient's insurance. It was shared that without being seen and evaluated in person, there is a risk that the information and/or assessment may be incomplete or inaccurate. This video visit may be discontinued by patient or clinician, if it is felt that the videoconferencing connections are not adequate for his situation.   Clinical Location: [facility]   Ankur's location [facility] and is physically present in   the [facility] at the time of this visit.     HISTORY OF PRESENT ILLNESS:  Ankur Flores is a 28 year old male who presents today with c/o throat pain starting on Saturday afternoon. Fever (Tmax 102) on Saturday evening, alleviated post Advil. Felt better on . Reports throat pain with swallowing. Denies difficulty swallowing. Good appetite, eating and drinking to norm and tolerating PO intake well. No drooling. Denies any known sick exposures but was around a lot of people on Saturday. Taking OTC Advil and an allergy medication for symptoms. Drinking a lot of water.     Past Medical History:   Diagnosis Date    Asthma     Seasonal allergies        Current Outpatient Medications   Medication Sig    Mometasone Furo-Formoterol Fum (DULERA IN)     ALBUTEROL IN     amoxicillin (AMOXIL) 875 MG tablet Take 1 tablet by mouth in the morning and 1 tablet in the evening. Do all this for 10 days.     No current facility-administered medications for this visit.       ALLERGIES:  No Known Allergies    Past Surgical History:   Procedure Laterality Date    Oral surgery  procedure         No family history on file.    Social History     Tobacco Use   Smoking Status Never   Smokeless Tobacco Never       Immunization History   Administered Date(s) Administered    COVID Pfizer 12Y+ (Requires Dilution) 04/05/2021, 05/03/2021, 11/14/2021    COVID Pfizer Bivalent 12Y+ 11/22/2022     Review of Systems   Constitutional:  Positive for fatigue and fever. Negative for activity change, appetite change and chills.   HENT:  Positive for congestion (described as mild) and sore throat. Negative for drooling, ear pain, postnasal drip, rhinorrhea, sinus pressure, sinus pain, sneezing and trouble swallowing.    Eyes: Negative.    Respiratory:  Negative for cough, chest tightness, shortness of breath and wheezing.    Cardiovascular:  Negative for chest pain and palpitations.   Gastrointestinal:  Negative for abdominal pain, diarrhea, nausea and vomiting.   Musculoskeletal:  Negative for myalgias.   Neurological:  Positive for headaches (mild). Negative for dizziness and light-headedness.     OBJECTIVE:     Visit Vitals  /85 (BP Location: LUE - Left upper extremity, Patient Position: Sitting, Cuff Size: Regular)   Pulse 96   Temp 99.5 °F (37.5 °C) (Oral)   Resp 18   Ht 5' 10\" (1.778 m)   Wt 77.1 kg (170 lb)   SpO2 100%   BMI 24.39 kg/m²        Physical Exam  Vitals reviewed.   Constitutional:       General: He is awake. He is not in acute distress.     Appearance: He is not ill-appearing, toxic-appearing or diaphoretic.   HENT:      Head: Normocephalic.      Right Ear: Ear canal normal. A middle ear effusion is present. Tympanic membrane is not erythematous or bulging.      Left Ear: Ear canal normal. A middle ear effusion is present. Tympanic membrane is not erythematous or bulging.      Nose: Nose normal.      Right Sinus: No maxillary sinus tenderness or frontal sinus tenderness.      Left Sinus: No maxillary sinus tenderness or frontal sinus tenderness.      Comments: Denies sinus tenderness  per self-palpation.     Mouth/Throat:      Lips: Pink.      Mouth: Mucous membranes are moist. Mucous membranes are not pale, not dry and not cyanotic.      Pharynx: Oropharynx is clear. Uvula midline. Posterior oropharyngeal erythema present. No pharyngeal swelling, oropharyngeal exudate or uvula swelling.      Tonsils: No tonsillar exudate. 2+ on the right. 2+ on the left.      Comments: No drooling. No vesicles or ulcerations. No fullness or bulging of soft palate. Reports tonsils are enlarged to norm.  Eyes:      Conjunctiva/sclera: Conjunctivae normal.   Cardiovascular:      Rate and Rhythm: Normal rate and regular rhythm.      Heart sounds: Normal heart sounds.      Comments: Apical HR 78  Pulmonary:      Effort: Pulmonary effort is normal.      Breath sounds: Normal breath sounds. No decreased breath sounds, wheezing, rhonchi or rales.      Comments: Respirations even and unlabored. Lungs clear to auscultation. Good air exchange throughout.  Musculoskeletal:      Cervical back: Normal range of motion and neck supple.   Lymphadenopathy:      Cervical: Cervical adenopathy: Denies cervical node enlargement or tenderness to self-palpation.   Skin:     Comments: Skin color normal for ethnicity.   Neurological:      Mental Status: He is alert and oriented to person, place, and time.   Psychiatric:         Behavior: Behavior is cooperative.       Results for orders placed or performed in visit on 04/29/24   POCT Rapid strep A   Result Value Ref Range    GRP A STREP Positive (A) Negative    Internal Procedural Controls Acceptable Yes Yes    TEST LOT NUMBER [id number]     TEST LOT EXPIRATION DATE 11/26/25        ASSESSMENT AND PLAN:     Problem List Items Addressed This Visit    None  Visit Diagnoses       Strep pharyngitis    -  Primary    Relevant Medications    amoxicillin (AMOXIL) 875 MG tablet    Sore throat        Relevant Orders    POCT Rapid strep A (Completed)        - Amoxicillin as prescribed: Patient reports  taking Amoxicillin in the past and tolerated well.    - Tylenol or Ibuprofen as per package instructions, as needed for pain or fever control if not contraindicated.  - Reinforced importance of increasing fluid intake to maintain hydration, good hand washing, and rest.    - Warm liquids, honey, throat lozenges, and salt water gargles.    - No sharing of eating or drinking utensils.  - Change toothbrush after three days of antibiotic use.    - Follow up with PCP in 2-3 days if symptoms do not improve or worsen.  - ER/ICC precautions reviewed.     FOLLOW UP:  Recheck if symptoms are not improving in 2-3 days  Follow up with PCP or return to clinic for new or worsening symptoms Take medcation(s) as directed.    Patient Instructions provided as documented in the AVS.    - Please follow up with your PCP as directed. If no PCP please call [phone] ([phone]) to establish care with an [facility] primary care provider.   - Exam findings discussed. Proper usage and side effects of medications reviewed and discussed. Discussed supportive care and treatment plan, and that if symptoms are not getting better or feels symptoms are getting worse then they should have further evaluation with their primacy care physician or ER/Immediate Care. Patient verbalized understanding of the plan and follow-up instructions, denies any questions, and in agreement with plan of care. All questions answered.        Susan Cade, MSN, FNP-BC   Asthma

## 2024-04-30 DIAGNOSIS — C64.2 MALIGNANT NEOPLASM OF LEFT KIDNEY, EXCEPT RENAL PELVIS: ICD-10-CM

## 2024-04-30 PROBLEM — E78.5 HYPERLIPIDEMIA, UNSPECIFIED: Chronic | Status: ACTIVE | Noted: 2024-01-17

## 2024-05-11 ENCOUNTER — INPATIENT (INPATIENT)
Facility: HOSPITAL | Age: 64
LOS: 2 days | Discharge: HOME | End: 2024-05-14
Attending: HOSPITALIST | Admitting: HOSPITALIST
Payer: MEDICAID

## 2024-05-11 VITALS
SYSTOLIC BLOOD PRESSURE: 124 MMHG | HEIGHT: 64 IN | DIASTOLIC BLOOD PRESSURE: 81 MMHG | OXYGEN SATURATION: 98 % | WEIGHT: 175.05 LBS | HEART RATE: 72 BPM | RESPIRATION RATE: 16 BRPM | TEMPERATURE: 98 F

## 2024-05-11 DIAGNOSIS — Z90.710 ACQUIRED ABSENCE OF BOTH CERVIX AND UTERUS: Chronic | ICD-10-CM

## 2024-05-11 DIAGNOSIS — Z98.890 OTHER SPECIFIED POSTPROCEDURAL STATES: Chronic | ICD-10-CM

## 2024-05-11 LAB
ALBUMIN SERPL ELPH-MCNC: 1.9 G/DL — LOW (ref 3.3–5)
ALP SERPL-CCNC: 184 U/L — HIGH (ref 40–120)
ALT FLD-CCNC: 33 U/L — SIGNIFICANT CHANGE UP (ref 12–78)
ANION GAP SERPL CALC-SCNC: 10 MMOL/L — SIGNIFICANT CHANGE UP (ref 5–17)
APPEARANCE UR: CLEAR — SIGNIFICANT CHANGE UP
AST SERPL-CCNC: 30 U/L — SIGNIFICANT CHANGE UP (ref 15–37)
BACTERIA # UR AUTO: ABNORMAL /HPF
BASOPHILS # BLD AUTO: 0.02 K/UL — SIGNIFICANT CHANGE UP (ref 0–0.2)
BASOPHILS NFR BLD AUTO: 0.3 % — SIGNIFICANT CHANGE UP (ref 0–2)
BILIRUB SERPL-MCNC: 0.5 MG/DL — SIGNIFICANT CHANGE UP (ref 0.2–1.2)
BILIRUB UR-MCNC: NEGATIVE — SIGNIFICANT CHANGE UP
BUN SERPL-MCNC: 49 MG/DL — HIGH (ref 7–23)
CALCIUM SERPL-MCNC: 7.9 MG/DL — LOW (ref 8.5–10.1)
CHLORIDE SERPL-SCNC: 103 MMOL/L — SIGNIFICANT CHANGE UP (ref 96–108)
CO2 SERPL-SCNC: 21 MMOL/L — LOW (ref 22–31)
COLOR SPEC: YELLOW — SIGNIFICANT CHANGE UP
CREAT SERPL-MCNC: 4.1 MG/DL — HIGH (ref 0.5–1.3)
DIFF PNL FLD: ABNORMAL
EGFR: 12 ML/MIN/1.73M2 — LOW
EOSINOPHIL # BLD AUTO: 0.44 K/UL — SIGNIFICANT CHANGE UP (ref 0–0.5)
EOSINOPHIL NFR BLD AUTO: 6 % — SIGNIFICANT CHANGE UP (ref 0–6)
EPI CELLS # UR: PRESENT
GI PCR PANEL: SIGNIFICANT CHANGE UP
GLUCOSE BLDC GLUCOMTR-MCNC: 241 MG/DL — HIGH (ref 70–99)
GLUCOSE SERPL-MCNC: 207 MG/DL — HIGH (ref 70–99)
GLUCOSE UR QL: 100 MG/DL
HCT VFR BLD CALC: 39.2 % — SIGNIFICANT CHANGE UP (ref 34.5–45)
HGB BLD-MCNC: 13.1 G/DL — SIGNIFICANT CHANGE UP (ref 11.5–15.5)
IMM GRANULOCYTES NFR BLD AUTO: 0.7 % — SIGNIFICANT CHANGE UP (ref 0–0.9)
KETONES UR-MCNC: NEGATIVE MG/DL — SIGNIFICANT CHANGE UP
LACTATE SERPL-SCNC: 0.9 MMOL/L — SIGNIFICANT CHANGE UP (ref 0.7–2)
LEUKOCYTE ESTERASE UR-ACNC: NEGATIVE — SIGNIFICANT CHANGE UP
LIDOCAIN IGE QN: 27 U/L — SIGNIFICANT CHANGE UP (ref 13–75)
LYMPHOCYTES # BLD AUTO: 1.53 K/UL — SIGNIFICANT CHANGE UP (ref 1–3.3)
LYMPHOCYTES # BLD AUTO: 20.7 % — SIGNIFICANT CHANGE UP (ref 13–44)
MCHC RBC-ENTMCNC: 28.8 PG — SIGNIFICANT CHANGE UP (ref 27–34)
MCHC RBC-ENTMCNC: 33.4 G/DL — SIGNIFICANT CHANGE UP (ref 32–36)
MCV RBC AUTO: 86.2 FL — SIGNIFICANT CHANGE UP (ref 80–100)
MONOCYTES # BLD AUTO: 0.72 K/UL — SIGNIFICANT CHANGE UP (ref 0–0.9)
MONOCYTES NFR BLD AUTO: 9.7 % — SIGNIFICANT CHANGE UP (ref 2–14)
NEUTROPHILS # BLD AUTO: 4.63 K/UL — SIGNIFICANT CHANGE UP (ref 1.8–7.4)
NEUTROPHILS NFR BLD AUTO: 62.6 % — SIGNIFICANT CHANGE UP (ref 43–77)
NITRITE UR-MCNC: NEGATIVE — SIGNIFICANT CHANGE UP
NRBC # BLD: 0 /100 WBCS — SIGNIFICANT CHANGE UP (ref 0–0)
PH UR: 6 — SIGNIFICANT CHANGE UP (ref 5–8)
PLATELET # BLD AUTO: 238 K/UL — SIGNIFICANT CHANGE UP (ref 150–400)
POTASSIUM SERPL-MCNC: 4.8 MMOL/L — SIGNIFICANT CHANGE UP (ref 3.5–5.3)
POTASSIUM SERPL-SCNC: 4.8 MMOL/L — SIGNIFICANT CHANGE UP (ref 3.5–5.3)
PROT SERPL-MCNC: 6.9 GM/DL — SIGNIFICANT CHANGE UP (ref 6–8.3)
PROT UR-MCNC: 300 MG/DL
RBC # BLD: 4.55 M/UL — SIGNIFICANT CHANGE UP (ref 3.8–5.2)
RBC # FLD: 17.4 % — HIGH (ref 10.3–14.5)
RBC CASTS # UR COMP ASSIST: 5 /HPF — HIGH (ref 0–4)
SODIUM SERPL-SCNC: 134 MMOL/L — LOW (ref 135–145)
SP GR SPEC: 1.01 — SIGNIFICANT CHANGE UP (ref 1–1.03)
UROBILINOGEN FLD QL: 0.2 MG/DL — SIGNIFICANT CHANGE UP (ref 0.2–1)
WBC # BLD: 7.39 K/UL — SIGNIFICANT CHANGE UP (ref 3.8–10.5)
WBC # FLD AUTO: 7.39 K/UL — SIGNIFICANT CHANGE UP (ref 3.8–10.5)
WBC UR QL: 2 /HPF — SIGNIFICANT CHANGE UP (ref 0–5)

## 2024-05-11 PROCEDURE — 99223 1ST HOSP IP/OBS HIGH 75: CPT

## 2024-05-11 PROCEDURE — 99285 EMERGENCY DEPT VISIT HI MDM: CPT

## 2024-05-11 PROCEDURE — 93010 ELECTROCARDIOGRAM REPORT: CPT

## 2024-05-11 PROCEDURE — 74176 CT ABD & PELVIS W/O CONTRAST: CPT | Mod: 26,MC

## 2024-05-11 RX ORDER — DEXTROSE 50 % IN WATER 50 %
50 SYRINGE (ML) INTRAVENOUS ONCE
Refills: 0 | Status: COMPLETED | OUTPATIENT
Start: 2024-05-11 | End: 2024-05-11

## 2024-05-11 RX ORDER — SODIUM CHLORIDE 9 MG/ML
1000 INJECTION, SOLUTION INTRAVENOUS ONCE
Refills: 0 | Status: COMPLETED | OUTPATIENT
Start: 2024-05-11 | End: 2024-05-11

## 2024-05-11 RX ORDER — ACETAMINOPHEN 500 MG
1000 TABLET ORAL ONCE
Refills: 0 | Status: COMPLETED | OUTPATIENT
Start: 2024-05-11 | End: 2024-05-11

## 2024-05-11 RX ORDER — FAMOTIDINE 10 MG/ML
20 INJECTION INTRAVENOUS ONCE
Refills: 0 | Status: COMPLETED | OUTPATIENT
Start: 2024-05-11 | End: 2024-05-11

## 2024-05-11 RX ORDER — METOCLOPRAMIDE HCL 10 MG
10 TABLET ORAL ONCE
Refills: 0 | Status: COMPLETED | OUTPATIENT
Start: 2024-05-11 | End: 2024-05-11

## 2024-05-11 RX ADMIN — Medication 1000 MILLIGRAM(S): at 14:17

## 2024-05-11 RX ADMIN — SODIUM CHLORIDE 1000 MILLILITER(S): 9 INJECTION, SOLUTION INTRAVENOUS at 16:00

## 2024-05-11 RX ADMIN — Medication 50 MILLILITER(S): at 19:01

## 2024-05-11 RX ADMIN — Medication 10 MILLIGRAM(S): at 13:17

## 2024-05-11 RX ADMIN — Medication 400 MILLIGRAM(S): at 13:17

## 2024-05-11 RX ADMIN — SODIUM CHLORIDE 1000 MILLILITER(S): 9 INJECTION, SOLUTION INTRAVENOUS at 13:17

## 2024-05-11 RX ADMIN — FAMOTIDINE 20 MILLIGRAM(S): 10 INJECTION INTRAVENOUS at 13:17

## 2024-05-11 NOTE — ED ADULT NURSE NOTE - OBJECTIVE STATEMENT
Pt presents to ED A&Ox3 c/o n/v, diarrhea x 1 week,pt reports symptoms began after starting new chemo drug. Hx of metastatic renal CA that metastasize to breast and pancreas.  Pt endorses decrease PO intake and approximately 10 loose yellow stools per day. Pt was prescribed Zofran and loperamide by oncologist with no relief. Pt speaking in clear complete sentences, ambulating with equal steady gait.

## 2024-05-11 NOTE — ED PROVIDER NOTE - CLINICAL SUMMARY MEDICAL DECISION MAKING FREE TEXT BOX
63F pmhx HTN, DM, metastatic renal cancer with mets to breast, pancreas, who presents for evaluation of persistent nausea and diffuse abd discomfort, diminished PO intake, with frequent daily yellowish loose stools up to 10 per day without any vomiting. Symptom duration x 1 week - pt states started after initiation of new chemo drug - was prescribed zofran 8mg bid and loperamide by oncologist without relief of symptoms so referred to ED. Pt reports generalized weakness. denies chest pain, sob, fever. Last admitted Jan 2024 for JAX, rhabdo and UTI.  Pt on eval appears dehydrated, fatigued, diffuse abd tenderness, possible chemo medication reaction - check labs to assess for metabolic derangements, rule out infcn, eval ct a/o rule out complicated colitis, lower suspicion of obstructive pathology as pt reports multiple loose stools but has not been able to tolerate po, trial reglan for nausea, supportive care, reassess.    ID [id number] Dyllan pratt

## 2024-05-11 NOTE — ED ADULT TRIAGE NOTE - CHIEF COMPLAINT QUOTE
brought by ems for nausea, vomiting and diarrhea . history of kidney cancer spread to pancreas and DM

## 2024-05-11 NOTE — ED PROVIDER NOTE - PHYSICAL EXAMINATION
Gen: aox3, nad,   Head: NCAT  ENT: Airway patent, dry mucous membranes, nasal passageways clear   Cardiac: Normal rate, normal rhythm   Respiratory: Lungs CTA B/L  Gastrointestinal: Abdomen soft, diffuse mild tenderness throughout , nondistended, no rebound, no guarding  MSK: No gross abnormalities, FROM of all four extremities, no edema  HEME: Extremities warm, pulses intact and symmetrical in all four extremities  Skin: No rashes, no lesions  Neuro: No gross neurologic deficits

## 2024-05-11 NOTE — ED PROVIDER NOTE - OBJECTIVE STATEMENT
63F pmhx HTN, DM, metastatic renal cancer with mets to breast, pancreas, who presents for evaluation of persistent nausea and diffuse abd discomfort, diminished PO intake, with frequent daily yellowish loose stools up to 10 per day without any vomiting. Symptom duration x 1 week - pt states started after initiation of new chemo drug - was prescribed zofran 8mg bid and loperamide by oncologist without relief of symptoms so referred to ED. Pt reports generalized weakness. denies chest pain, sob, fever. Last admitted Jan 2024 for JAX, rhabdo and UTI.

## 2024-05-11 NOTE — ED ADULT NURSE REASSESSMENT NOTE - NS ED NURSE REASSESS COMMENT FT1
Post D50  correlates with CGM also 241.
Patient with CGM in place: CGM correlates with Fingersticks taken earlier. Patient reports CGM level 44-45, Dr. Buck aware 1 amp D50 given IVP as ordered.

## 2024-05-12 DIAGNOSIS — N17.9 ACUTE KIDNEY FAILURE, UNSPECIFIED: ICD-10-CM

## 2024-05-12 DIAGNOSIS — E78.5 HYPERLIPIDEMIA, UNSPECIFIED: ICD-10-CM

## 2024-05-12 DIAGNOSIS — K52.9 NONINFECTIVE GASTROENTERITIS AND COLITIS, UNSPECIFIED: ICD-10-CM

## 2024-05-12 DIAGNOSIS — C64.9 MALIGNANT NEOPLASM OF UNSPECIFIED KIDNEY, EXCEPT RENAL PELVIS: ICD-10-CM

## 2024-05-12 DIAGNOSIS — Z29.9 ENCOUNTER FOR PROPHYLACTIC MEASURES, UNSPECIFIED: ICD-10-CM

## 2024-05-12 DIAGNOSIS — E11.9 TYPE 2 DIABETES MELLITUS WITHOUT COMPLICATIONS: ICD-10-CM

## 2024-05-12 DIAGNOSIS — E03.9 HYPOTHYROIDISM, UNSPECIFIED: ICD-10-CM

## 2024-05-12 DIAGNOSIS — I10 ESSENTIAL (PRIMARY) HYPERTENSION: ICD-10-CM

## 2024-05-12 LAB
CULTURE RESULTS: SIGNIFICANT CHANGE UP
GLUCOSE BLDC GLUCOMTR-MCNC: 101 MG/DL — HIGH (ref 70–99)
GLUCOSE BLDC GLUCOMTR-MCNC: 139 MG/DL — HIGH (ref 70–99)
GLUCOSE BLDC GLUCOMTR-MCNC: 156 MG/DL — HIGH (ref 70–99)
GLUCOSE BLDC GLUCOMTR-MCNC: 160 MG/DL — HIGH (ref 70–99)
GLUCOSE BLDC GLUCOMTR-MCNC: 71 MG/DL — SIGNIFICANT CHANGE UP (ref 70–99)
SPECIMEN SOURCE: SIGNIFICANT CHANGE UP

## 2024-05-12 PROCEDURE — 99232 SBSQ HOSP IP/OBS MODERATE 35: CPT

## 2024-05-12 RX ORDER — CARVEDILOL PHOSPHATE 80 MG/1
12.5 CAPSULE, EXTENDED RELEASE ORAL EVERY 12 HOURS
Refills: 0 | Status: DISCONTINUED | OUTPATIENT
Start: 2024-05-12 | End: 2024-05-14

## 2024-05-12 RX ORDER — ACETAMINOPHEN 500 MG
650 TABLET ORAL EVERY 6 HOURS
Refills: 0 | Status: DISCONTINUED | OUTPATIENT
Start: 2024-05-12 | End: 2024-05-14

## 2024-05-12 RX ORDER — SODIUM CHLORIDE 9 MG/ML
1000 INJECTION, SOLUTION INTRAVENOUS
Refills: 0 | Status: DISCONTINUED | OUTPATIENT
Start: 2024-05-12 | End: 2024-05-14

## 2024-05-12 RX ORDER — METRONIDAZOLE 500 MG
500 TABLET ORAL EVERY 12 HOURS
Refills: 0 | Status: DISCONTINUED | OUTPATIENT
Start: 2024-05-12 | End: 2024-05-13

## 2024-05-12 RX ORDER — INSULIN GLARGINE 100 [IU]/ML
13 INJECTION, SOLUTION SUBCUTANEOUS EVERY MORNING
Refills: 0 | Status: DISCONTINUED | OUTPATIENT
Start: 2024-05-12 | End: 2024-05-14

## 2024-05-12 RX ORDER — LANOLIN ALCOHOL/MO/W.PET/CERES
3 CREAM (GRAM) TOPICAL AT BEDTIME
Refills: 0 | Status: DISCONTINUED | OUTPATIENT
Start: 2024-05-12 | End: 2024-05-14

## 2024-05-12 RX ORDER — ONDANSETRON 8 MG/1
4 TABLET, FILM COATED ORAL EVERY 8 HOURS
Refills: 0 | Status: DISCONTINUED | OUTPATIENT
Start: 2024-05-12 | End: 2024-05-14

## 2024-05-12 RX ORDER — GLUCAGON INJECTION, SOLUTION 0.5 MG/.1ML
1 INJECTION, SOLUTION SUBCUTANEOUS ONCE
Refills: 0 | Status: DISCONTINUED | OUTPATIENT
Start: 2024-05-12 | End: 2024-05-14

## 2024-05-12 RX ORDER — DEXTROSE 50 % IN WATER 50 %
12.5 SYRINGE (ML) INTRAVENOUS ONCE
Refills: 0 | Status: DISCONTINUED | OUTPATIENT
Start: 2024-05-12 | End: 2024-05-14

## 2024-05-12 RX ORDER — CEFTRIAXONE 500 MG/1
1000 INJECTION, POWDER, FOR SOLUTION INTRAMUSCULAR; INTRAVENOUS EVERY 24 HOURS
Refills: 0 | Status: DISCONTINUED | OUTPATIENT
Start: 2024-05-12 | End: 2024-05-14

## 2024-05-12 RX ORDER — HEPARIN SODIUM 5000 [USP'U]/ML
5000 INJECTION INTRAVENOUS; SUBCUTANEOUS EVERY 12 HOURS
Refills: 0 | Status: DISCONTINUED | OUTPATIENT
Start: 2024-05-12 | End: 2024-05-14

## 2024-05-12 RX ORDER — CEFTRIAXONE 500 MG/1
INJECTION, POWDER, FOR SOLUTION INTRAMUSCULAR; INTRAVENOUS
Refills: 0 | Status: DISCONTINUED | OUTPATIENT
Start: 2024-05-12 | End: 2024-05-12

## 2024-05-12 RX ORDER — DEXTROSE 50 % IN WATER 50 %
25 SYRINGE (ML) INTRAVENOUS ONCE
Refills: 0 | Status: DISCONTINUED | OUTPATIENT
Start: 2024-05-12 | End: 2024-05-14

## 2024-05-12 RX ORDER — CEFTRIAXONE 500 MG/1
1000 INJECTION, POWDER, FOR SOLUTION INTRAMUSCULAR; INTRAVENOUS ONCE
Refills: 0 | Status: DISCONTINUED | OUTPATIENT
Start: 2024-05-12 | End: 2024-05-12

## 2024-05-12 RX ORDER — DEXTROSE 50 % IN WATER 50 %
15 SYRINGE (ML) INTRAVENOUS ONCE
Refills: 0 | Status: DISCONTINUED | OUTPATIENT
Start: 2024-05-12 | End: 2024-05-14

## 2024-05-12 RX ORDER — CHOLECALCIFEROL (VITAMIN D3) 125 MCG
1000 CAPSULE ORAL DAILY
Refills: 0 | Status: DISCONTINUED | OUTPATIENT
Start: 2024-05-12 | End: 2024-05-14

## 2024-05-12 RX ORDER — INSULIN LISPRO 100/ML
VIAL (ML) SUBCUTANEOUS AT BEDTIME
Refills: 0 | Status: DISCONTINUED | OUTPATIENT
Start: 2024-05-12 | End: 2024-05-14

## 2024-05-12 RX ORDER — AMLODIPINE BESYLATE 2.5 MG/1
10 TABLET ORAL DAILY
Refills: 0 | Status: DISCONTINUED | OUTPATIENT
Start: 2024-05-12 | End: 2024-05-14

## 2024-05-12 RX ORDER — DEXTROSE 10 % IN WATER 10 %
125 INTRAVENOUS SOLUTION INTRAVENOUS ONCE
Refills: 0 | Status: DISCONTINUED | OUTPATIENT
Start: 2024-05-12 | End: 2024-05-14

## 2024-05-12 RX ORDER — LEVOTHYROXINE SODIUM 125 MCG
150 TABLET ORAL DAILY
Refills: 0 | Status: DISCONTINUED | OUTPATIENT
Start: 2024-05-12 | End: 2024-05-14

## 2024-05-12 RX ORDER — GABAPENTIN 400 MG/1
300 CAPSULE ORAL AT BEDTIME
Refills: 0 | Status: DISCONTINUED | OUTPATIENT
Start: 2024-05-12 | End: 2024-05-14

## 2024-05-12 RX ORDER — LORATADINE 10 MG/1
10 TABLET ORAL DAILY
Refills: 0 | Status: DISCONTINUED | OUTPATIENT
Start: 2024-05-12 | End: 2024-05-14

## 2024-05-12 RX ORDER — ATORVASTATIN CALCIUM 80 MG/1
10 TABLET, FILM COATED ORAL AT BEDTIME
Refills: 0 | Status: DISCONTINUED | OUTPATIENT
Start: 2024-05-12 | End: 2024-05-14

## 2024-05-12 RX ORDER — INSULIN GLARGINE 100 [IU]/ML
13 INJECTION, SOLUTION SUBCUTANEOUS AT BEDTIME
Refills: 0 | Status: DISCONTINUED | OUTPATIENT
Start: 2024-05-12 | End: 2024-05-14

## 2024-05-12 RX ORDER — INSULIN LISPRO 100/ML
VIAL (ML) SUBCUTANEOUS
Refills: 0 | Status: DISCONTINUED | OUTPATIENT
Start: 2024-05-12 | End: 2024-05-14

## 2024-05-12 RX ORDER — MONTELUKAST 4 MG/1
10 TABLET, CHEWABLE ORAL AT BEDTIME
Refills: 0 | Status: DISCONTINUED | OUTPATIENT
Start: 2024-05-12 | End: 2024-05-14

## 2024-05-12 RX ADMIN — SODIUM CHLORIDE 100 MILLILITER(S): 9 INJECTION, SOLUTION INTRAVENOUS at 10:58

## 2024-05-12 RX ADMIN — CEFTRIAXONE 100 MILLIGRAM(S): 500 INJECTION, POWDER, FOR SOLUTION INTRAMUSCULAR; INTRAVENOUS at 06:47

## 2024-05-12 RX ADMIN — AMLODIPINE BESYLATE 10 MILLIGRAM(S): 2.5 TABLET ORAL at 06:23

## 2024-05-12 RX ADMIN — SODIUM CHLORIDE 100 MILLILITER(S): 9 INJECTION, SOLUTION INTRAVENOUS at 04:59

## 2024-05-12 RX ADMIN — INSULIN GLARGINE 13 UNIT(S): 100 INJECTION, SOLUTION SUBCUTANEOUS at 08:19

## 2024-05-12 RX ADMIN — Medication 150 MICROGRAM(S): at 06:23

## 2024-05-12 RX ADMIN — CARVEDILOL PHOSPHATE 12.5 MILLIGRAM(S): 80 CAPSULE, EXTENDED RELEASE ORAL at 17:42

## 2024-05-12 RX ADMIN — Medication 1000 UNIT(S): at 12:39

## 2024-05-12 RX ADMIN — MONTELUKAST 10 MILLIGRAM(S): 4 TABLET, CHEWABLE ORAL at 22:33

## 2024-05-12 RX ADMIN — HEPARIN SODIUM 5000 UNIT(S): 5000 INJECTION INTRAVENOUS; SUBCUTANEOUS at 06:27

## 2024-05-12 RX ADMIN — GABAPENTIN 300 MILLIGRAM(S): 400 CAPSULE ORAL at 22:26

## 2024-05-12 RX ADMIN — Medication 100 MILLIGRAM(S): at 04:59

## 2024-05-12 RX ADMIN — Medication 2: at 16:49

## 2024-05-12 RX ADMIN — ATORVASTATIN CALCIUM 10 MILLIGRAM(S): 80 TABLET, FILM COATED ORAL at 22:21

## 2024-05-12 RX ADMIN — Medication 2: at 08:18

## 2024-05-12 RX ADMIN — CARVEDILOL PHOSPHATE 12.5 MILLIGRAM(S): 80 CAPSULE, EXTENDED RELEASE ORAL at 06:23

## 2024-05-12 RX ADMIN — SODIUM CHLORIDE 100 MILLILITER(S): 9 INJECTION, SOLUTION INTRAVENOUS at 19:00

## 2024-05-12 RX ADMIN — Medication 100 MILLIGRAM(S): at 17:43

## 2024-05-12 RX ADMIN — HEPARIN SODIUM 5000 UNIT(S): 5000 INJECTION INTRAVENOUS; SUBCUTANEOUS at 17:42

## 2024-05-12 NOTE — H&P ADULT - PROBLEM SELECTOR PLAN 1
Acute on Chronic Kidney Injury  -Baseline Creatinine: ~ 1.5  -Creatinine on admission: 4.1  -Suspect pre-renal kidney injury.  -F/u US bladder and kidneys.  -Continue with IV hydration.  -Trend Creatinine.  -Avoid nephrotoxic drugs.  -Consider renal consult in am.

## 2024-05-12 NOTE — PATIENT PROFILE ADULT - FUNCTIONAL ASSESSMENT - BASIC MOBILITY 6.
3-calculated by average/Not able to assess (calculate score using [facility] averaging method)

## 2024-05-12 NOTE — H&P ADULT - PROBLEM SELECTOR PLAN 4
controlled  c/w amlodipine and coreg - noted to have hx of renal cancer follows with [facility] Dr. Pelaez  - [facility] to be consulted in AM.

## 2024-05-12 NOTE — H&P ADULT - PROBLEM SELECTOR PLAN 3
On tresiba 26U BID  Hypoglycemic briefly while here  started on lantus 13 BID with ISF coverage  hypoglycemia protocol  adjust insulin as needed. c/w levothyroxine

## 2024-05-12 NOTE — H&P ADULT - NSHPPHYSICALEXAM_GEN_ALL_CORE
T(C): 36.4 (05-11-24 @ 22:20), Max: 36.5 (05-11-24 @ 12:13)  HR: 70 (05-11-24 @ 22:20) (64 - 72)  BP: 137/84 (05-11-24 @ 22:20) (124/81 - 145/83)  RR: 18 (05-11-24 @ 22:20) (16 - 18)  SpO2: 95% (05-11-24 @ 22:20) (95% - 98%)    General: non-toxic  HEENT: non-traumatic, perrla, eomi  Cardio: s1s2 regular rate and rhythm  Lungs: comfortable breathing, clear to auscultation  Abdomen: Soft, non-tender, non-distended  Neuro: AOx4  Ext: Pulses +2

## 2024-05-12 NOTE — H&P ADULT - HISTORY OF PRESENT ILLNESS
63F pmhx HTN, DM, metastatic renal cancer with mets to breast, pancreas, who presents for evaluation of persistent nausea and diffuse abd discomfort, diminished PO intake, with frequent daily yellowish loose stools up to 10 per day without any vomiting. Symptom duration x 1 week - pt states started after initiation of new chemo drug - was prescribed zofran 8mg bid and loperamide by oncologist without relief of symptoms so referred to ED. Pt reports generalized weakness. denies chest pain, sob, fever.   In ED vitals stable, CBC unremarkable, CMP noted for BUN/Cr 49/4.1, lactate 0.9, CT abd noted for colitis. Admitted for ARF and colitis.

## 2024-05-12 NOTE — H&P ADULT - ASSESSMENT
63F admitted for ARF and colitis.  63F admitted for ARF and colitis.     Med rec as per DC summary from 1/2024, please confirm medications in am.

## 2024-05-12 NOTE — PATIENT PROFILE ADULT - FALL HARM RISK - HARM RISK INTERVENTIONS

## 2024-05-12 NOTE — H&P ADULT - NSHPLABSRESULTS_GEN_ALL_CORE
LABS:  cret                        13.1   7.39  )-----------( 238      ( 11 May 2024 13:30 )             39.2     05-11    134<L>  |  103  |  49<H>  ----------------------------<  207<H>  4.8   |  21<L>  |  4.10<H>    Ca    7.9<L>      11 May 2024 13:30    TPro  6.9  /  Alb  1.9<L>  /  TBili  0.5  /  DBili  x   /  AST  30  /  ALT  33  /  AlkPhos  184<H>  05-11    < from: CT Abdomen and Pelvis No Cont (05.11.24 @ 16:52) >    IMPRESSION:  There is fat stranding surrounding the right hemicolon from the mid   ascending colon to the hepatic flexure, suspicious for an underlying   colitis.    Otherwise, no acute abnormality identified within the abdomen or pelvis.    Interval decrease in size of the previously noted lesions within the   pancreatic body and tail with interval pancreatic atrophy, possibly   previously treated pancreatic metastases. Further evaluation with   pancreas protocol MRI or CT is suggested on a nonemergent/outpatient   basis as clinically appropriate.        --- End of Report ---    < end of copied text >

## 2024-05-12 NOTE — H&P ADULT - PROBLEM SELECTOR PLAN 5
c/w statin On tresiba 26U BID  Hypoglycemic briefly while here  started on lantus 13 BID with ISF coverage  hypoglycemia protocol  adjust insulin as needed.

## 2024-05-12 NOTE — H&P ADULT - PROBLEM SELECTOR PLAN 2
-Chemo induced vs infectious  -Start ceftriaxone and flagyl  -F/u stool pcr and culture -Chemo induced vs infectious  -Start ceftriaxone and flagyl  -F/u stool pcr and culture  -PRN zofran

## 2024-05-13 LAB
A1C WITH ESTIMATED AVERAGE GLUCOSE RESULT: 7.1 % — HIGH (ref 4–5.6)
ALBUMIN SERPL ELPH-MCNC: 1.6 G/DL — LOW (ref 3.3–5)
ALP SERPL-CCNC: 121 U/L — HIGH (ref 40–120)
ALT FLD-CCNC: 20 U/L — SIGNIFICANT CHANGE UP (ref 12–78)
ANION GAP SERPL CALC-SCNC: 9 MMOL/L — SIGNIFICANT CHANGE UP (ref 5–17)
AST SERPL-CCNC: 19 U/L — SIGNIFICANT CHANGE UP (ref 15–37)
BASOPHILS # BLD AUTO: 0.02 K/UL — SIGNIFICANT CHANGE UP (ref 0–0.2)
BASOPHILS NFR BLD AUTO: 0.4 % — SIGNIFICANT CHANGE UP (ref 0–2)
BILIRUB SERPL-MCNC: 0.4 MG/DL — SIGNIFICANT CHANGE UP (ref 0.2–1.2)
BUN SERPL-MCNC: 36 MG/DL — HIGH (ref 7–23)
C DIFF GDH STL QL: NEGATIVE — SIGNIFICANT CHANGE UP
C DIFF GDH STL QL: SIGNIFICANT CHANGE UP
CALCIUM SERPL-MCNC: 7.9 MG/DL — LOW (ref 8.5–10.1)
CHLORIDE SERPL-SCNC: 113 MMOL/L — HIGH (ref 96–108)
CHOLEST SERPL-MCNC: 329 MG/DL — HIGH
CO2 SERPL-SCNC: 20 MMOL/L — LOW (ref 22–31)
CREAT SERPL-MCNC: 2.4 MG/DL — HIGH (ref 0.5–1.3)
CULTURE RESULTS: SIGNIFICANT CHANGE UP
EGFR: 22 ML/MIN/1.73M2 — LOW
EOSINOPHIL # BLD AUTO: 0.27 K/UL — SIGNIFICANT CHANGE UP (ref 0–0.5)
EOSINOPHIL NFR BLD AUTO: 5.8 % — SIGNIFICANT CHANGE UP (ref 0–6)
ESTIMATED AVERAGE GLUCOSE: 157 MG/DL — HIGH (ref 68–114)
GLUCOSE BLDC GLUCOMTR-MCNC: 127 MG/DL — HIGH (ref 70–99)
GLUCOSE BLDC GLUCOMTR-MCNC: 176 MG/DL — HIGH (ref 70–99)
GLUCOSE BLDC GLUCOMTR-MCNC: 179 MG/DL — HIGH (ref 70–99)
GLUCOSE BLDC GLUCOMTR-MCNC: 192 MG/DL — HIGH (ref 70–99)
GLUCOSE BLDC GLUCOMTR-MCNC: 51 MG/DL — CRITICAL LOW (ref 70–99)
GLUCOSE BLDC GLUCOMTR-MCNC: 95 MG/DL — SIGNIFICANT CHANGE UP (ref 70–99)
GLUCOSE SERPL-MCNC: 213 MG/DL — HIGH (ref 70–99)
HCT VFR BLD CALC: 31.4 % — LOW (ref 34.5–45)
HDLC SERPL-MCNC: 23 MG/DL — LOW
HGB BLD-MCNC: 10.7 G/DL — LOW (ref 11.5–15.5)
IMM GRANULOCYTES NFR BLD AUTO: 0.4 % — SIGNIFICANT CHANGE UP (ref 0–0.9)
LIPID PNL WITH DIRECT LDL SERPL: 176 MG/DL — HIGH
LYMPHOCYTES # BLD AUTO: 1.43 K/UL — SIGNIFICANT CHANGE UP (ref 1–3.3)
LYMPHOCYTES # BLD AUTO: 30.6 % — SIGNIFICANT CHANGE UP (ref 13–44)
MAGNESIUM SERPL-MCNC: 1.5 MG/DL — LOW (ref 1.6–2.6)
MCHC RBC-ENTMCNC: 29.3 PG — SIGNIFICANT CHANGE UP (ref 27–34)
MCHC RBC-ENTMCNC: 34.1 G/DL — SIGNIFICANT CHANGE UP (ref 32–36)
MCV RBC AUTO: 86 FL — SIGNIFICANT CHANGE UP (ref 80–100)
MONOCYTES # BLD AUTO: 0.38 K/UL — SIGNIFICANT CHANGE UP (ref 0–0.9)
MONOCYTES NFR BLD AUTO: 8.1 % — SIGNIFICANT CHANGE UP (ref 2–14)
NEUTROPHILS # BLD AUTO: 2.55 K/UL — SIGNIFICANT CHANGE UP (ref 1.8–7.4)
NEUTROPHILS NFR BLD AUTO: 54.7 % — SIGNIFICANT CHANGE UP (ref 43–77)
NON HDL CHOLESTEROL: 306 MG/DL — HIGH
NRBC # BLD: 0 /100 WBCS — SIGNIFICANT CHANGE UP (ref 0–0)
PHOSPHATE SERPL-MCNC: 3.7 MG/DL — SIGNIFICANT CHANGE UP (ref 2.5–4.5)
PLATELET # BLD AUTO: 228 K/UL — SIGNIFICANT CHANGE UP (ref 150–400)
POTASSIUM SERPL-MCNC: 4.6 MMOL/L — SIGNIFICANT CHANGE UP (ref 3.5–5.3)
POTASSIUM SERPL-SCNC: 4.6 MMOL/L — SIGNIFICANT CHANGE UP (ref 3.5–5.3)
PROT SERPL-MCNC: 5.3 GM/DL — LOW (ref 6–8.3)
RBC # BLD: 3.65 M/UL — LOW (ref 3.8–5.2)
RBC # FLD: 17.6 % — HIGH (ref 10.3–14.5)
SODIUM SERPL-SCNC: 142 MMOL/L — SIGNIFICANT CHANGE UP (ref 135–145)
SPECIMEN SOURCE: SIGNIFICANT CHANGE UP
TRIGL SERPL-MCNC: 625 MG/DL — HIGH
WBC # BLD: 4.67 K/UL — SIGNIFICANT CHANGE UP (ref 3.8–10.5)
WBC # FLD AUTO: 4.67 K/UL — SIGNIFICANT CHANGE UP (ref 3.8–10.5)

## 2024-05-13 PROCEDURE — 99232 SBSQ HOSP IP/OBS MODERATE 35: CPT

## 2024-05-13 PROCEDURE — 76770 US EXAM ABDO BACK WALL COMP: CPT | Mod: 26

## 2024-05-13 RX ORDER — LOPERAMIDE HCL 2 MG
2 TABLET ORAL ONCE
Refills: 0 | Status: COMPLETED | OUTPATIENT
Start: 2024-05-13 | End: 2024-05-13

## 2024-05-13 RX ORDER — MAGNESIUM SULFATE 500 MG/ML
2 VIAL (ML) INJECTION ONCE
Refills: 0 | Status: COMPLETED | OUTPATIENT
Start: 2024-05-13 | End: 2024-05-13

## 2024-05-13 RX ORDER — SODIUM CHLORIDE 9 MG/ML
1000 INJECTION, SOLUTION INTRAVENOUS
Refills: 0 | Status: DISCONTINUED | OUTPATIENT
Start: 2024-05-13 | End: 2024-05-14

## 2024-05-13 RX ORDER — DEXTROSE 50 % IN WATER 50 %
15 SYRINGE (ML) INTRAVENOUS ONCE
Refills: 0 | Status: COMPLETED | OUTPATIENT
Start: 2024-05-13 | End: 2024-05-13

## 2024-05-13 RX ORDER — METRONIDAZOLE 500 MG
500 TABLET ORAL EVERY 12 HOURS
Refills: 0 | Status: DISCONTINUED | OUTPATIENT
Start: 2024-05-13 | End: 2024-05-14

## 2024-05-13 RX ADMIN — ATORVASTATIN CALCIUM 10 MILLIGRAM(S): 80 TABLET, FILM COATED ORAL at 21:44

## 2024-05-13 RX ADMIN — Medication 150 MICROGRAM(S): at 05:35

## 2024-05-13 RX ADMIN — GABAPENTIN 300 MILLIGRAM(S): 400 CAPSULE ORAL at 21:44

## 2024-05-13 RX ADMIN — MONTELUKAST 10 MILLIGRAM(S): 4 TABLET, CHEWABLE ORAL at 21:44

## 2024-05-13 RX ADMIN — SODIUM CHLORIDE 100 MILLILITER(S): 9 INJECTION, SOLUTION INTRAVENOUS at 21:43

## 2024-05-13 RX ADMIN — Medication 1000 UNIT(S): at 11:45

## 2024-05-13 RX ADMIN — SODIUM CHLORIDE 100 MILLILITER(S): 9 INJECTION, SOLUTION INTRAVENOUS at 15:44

## 2024-05-13 RX ADMIN — AMLODIPINE BESYLATE 10 MILLIGRAM(S): 2.5 TABLET ORAL at 05:29

## 2024-05-13 RX ADMIN — CARVEDILOL PHOSPHATE 12.5 MILLIGRAM(S): 80 CAPSULE, EXTENDED RELEASE ORAL at 17:40

## 2024-05-13 RX ADMIN — INSULIN GLARGINE 13 UNIT(S): 100 INJECTION, SOLUTION SUBCUTANEOUS at 21:45

## 2024-05-13 RX ADMIN — CARVEDILOL PHOSPHATE 12.5 MILLIGRAM(S): 80 CAPSULE, EXTENDED RELEASE ORAL at 05:29

## 2024-05-13 RX ADMIN — SODIUM CHLORIDE 100 MILLILITER(S): 9 INJECTION, SOLUTION INTRAVENOUS at 19:46

## 2024-05-13 RX ADMIN — Medication 0: at 21:46

## 2024-05-13 RX ADMIN — Medication 25 GRAM(S): at 11:40

## 2024-05-13 RX ADMIN — CEFTRIAXONE 100 MILLIGRAM(S): 500 INJECTION, POWDER, FOR SOLUTION INTRAMUSCULAR; INTRAVENOUS at 06:40

## 2024-05-13 RX ADMIN — Medication 15 GRAM(S): at 02:32

## 2024-05-13 RX ADMIN — Medication 500 MILLIGRAM(S): at 17:37

## 2024-05-13 RX ADMIN — Medication 100 MILLIGRAM(S): at 05:28

## 2024-05-13 RX ADMIN — HEPARIN SODIUM 5000 UNIT(S): 5000 INJECTION INTRAVENOUS; SUBCUTANEOUS at 05:35

## 2024-05-13 RX ADMIN — Medication 2: at 08:00

## 2024-05-13 RX ADMIN — INSULIN GLARGINE 13 UNIT(S): 100 INJECTION, SOLUTION SUBCUTANEOUS at 08:10

## 2024-05-13 RX ADMIN — Medication 2 MILLIGRAM(S): at 02:34

## 2024-05-13 RX ADMIN — HEPARIN SODIUM 5000 UNIT(S): 5000 INJECTION INTRAVENOUS; SUBCUTANEOUS at 17:37

## 2024-05-13 RX ADMIN — Medication 2: at 11:40

## 2024-05-13 NOTE — PROGRESS NOTE ADULT - PROBLEM SELECTOR PLAN 2
-Chemo induced vs infectious  -Start ceftriaxone and flagyl  -F/u stool pcr and culture  -PRN zofran
-Chemo induced vs infectious  -Start ceftriaxone and flagyl  -F/u stool pcr and culture  -PRN zofran

## 2024-05-13 NOTE — PROGRESS NOTE ADULT - ASSESSMENT
63F admitted for ARF and colitis.     Med rec as per DC summary from 1/2024, please confirm medications in am. 
63F admitted for ARF and colitis.     Improved will continue IVF  labs in AM continue to hold chemo meds   if labs are improved consider dc home

## 2024-05-13 NOTE — PROGRESS NOTE ADULT - PROBLEM SELECTOR PLAN 5
On tresiba 26U BID  Hypoglycemic briefly while here  started on lantus 13 BID with ISF coverage  hypoglycemia protocol  adjust insulin as needed.
On tresiba 26U BID  Hypoglycemic briefly while here  started on lantus 13 BID with ISF coverage  hypoglycemia protocol  adjust insulin as needed.

## 2024-05-13 NOTE — PROGRESS NOTE ADULT - PROBLEM SELECTOR PLAN 4
- noted to have hx of renal cancer follows with [facility] Dr. Pelaez  - [facility] to be consulted in AM.
- noted to have hx of renal cancer follows with [facility] Dr. Pelaez  - [facility] to be consulted in AM.

## 2024-05-13 NOTE — PROGRESS NOTE ADULT - PROBLEM SELECTOR PLAN 1
Acute on Chronic Kidney Injury  -Baseline Creatinine: ~ 1.5  -Creatinine on admission: 4.1  -Suspect pre-renal kidney injury.  -F/u US bladder and kidneys.  -Continue with IV hydration.  -Trend Creatinine.  -Avoid nephrotoxic drugs.  -Consider renal consult in am.
Acute on Chronic Kidney Injury  -Baseline Creatinine: ~ 1.5  -Creatinine on admission: 4.1  -Suspect pre-renal kidney injury.  -F/u US bladder and kidneys.  -Continue with IV hydration.  -Trend Creatinine.  -Avoid nephrotoxic drugs.  -Consider renal consult in am.

## 2024-05-13 NOTE — PROGRESS NOTE ADULT - SUBJECTIVE AND OBJECTIVE BOX
HPI:  63F pmhx HTN, DM, metastatic renal cancer with mets to breast, pancreas, who presents for evaluation of persistent nausea and diffuse abd discomfort, diminished PO intake, with frequent daily yellowish loose stools up to 10 per day without any vomiting. Symptom duration x 1 week - pt states started after initiation of new chemo drug - was prescribed zofran 8mg bid and loperamide by oncologist without relief of symptoms so referred to ED. Pt reports generalized weakness. denies chest pain, sob, fever.   In ED vitals stable, CBC unremarkable, CMP noted for BUN/Cr 49/4.1, lactate 0.9, CT abd noted for colitis. Admitted for ARF and colitis.  (12 May 2024 02:10)  Patient is a 63y old  Female who presents with a chief complaint of     INTERVAL HPI/OVERNIGHT EVENTS: no acute events     MEDICATIONS  (STANDING):  amLODIPine   Tablet 10 milliGRAM(s) Oral daily  atorvastatin 10 milliGRAM(s) Oral at bedtime  carvedilol 12.5 milliGRAM(s) Oral every 12 hours  cefTRIAXone   IVPB 1000 milliGRAM(s) IV Intermittent every 24 hours  cholecalciferol 1000 Unit(s) Oral daily  dextrose 10% Bolus 125 milliLiter(s) IV Bolus once  dextrose 5%. 1000 milliLiter(s) (50 mL/Hr) IV Continuous <Continuous>  dextrose 5%. 1000 milliLiter(s) (100 mL/Hr) IV Continuous <Continuous>  dextrose 50% Injectable 12.5 Gram(s) IV Push once  dextrose 50% Injectable 25 Gram(s) IV Push once  gabapentin 300 milliGRAM(s) Oral at bedtime  glucagon  Injectable 1 milliGRAM(s) IntraMuscular once  heparin   Injectable 5000 Unit(s) SubCutaneous every 12 hours  insulin glargine Injectable (LANTUS) 13 Unit(s) SubCutaneous every morning  insulin glargine Injectable (LANTUS) 13 Unit(s) SubCutaneous at bedtime  insulin lispro (ADMELOG) corrective regimen sliding scale   SubCutaneous three times a day before meals  insulin lispro (ADMELOG) corrective regimen sliding scale   SubCutaneous at bedtime  lactated ringers. 1000 milliLiter(s) (100 mL/Hr) IV Continuous <Continuous>  lactated ringers. 1000 milliLiter(s) (100 mL/Hr) IV Continuous <Continuous>  levothyroxine 150 MICROGram(s) Oral daily  metroNIDAZOLE    Tablet 500 milliGRAM(s) Oral every 12 hours  montelukast 10 milliGRAM(s) Oral at bedtime    MEDICATIONS  (PRN):  acetaminophen     Tablet .. 650 milliGRAM(s) Oral every 6 hours PRN Temp greater or equal to 38C (100.4F), Mild Pain (1 - 3)  aluminum hydroxide/magnesium hydroxide/simethicone Suspension 30 milliLiter(s) Oral every 4 hours PRN Dyspepsia  dextrose Oral Gel 15 Gram(s) Oral once PRN Blood Glucose LESS THAN 70 milliGRAM(s)/deciliter  loratadine 10 milliGRAM(s) Oral daily PRN allergies  melatonin 3 milliGRAM(s) Oral at bedtime PRN Insomnia  ondansetron Injectable 4 milliGRAM(s) IV Push every 8 hours PRN Nausea and/or Vomiting      Allergies    sulfa drugs (Hives)    Intolerances    atorvastatin (Unknown)      REVIEW OF SYSTEMS:  CONSTITUTIONAL: No fever, weight loss, or fatigue  EYES: No eye pain, visual disturbances, or discharge  ENMT:  No difficulty hearing, tinnitus, vertigo; No sinus or throat pain  NECK: No pain or stiffness  BREASTS: No pain, masses, or nipple discharge  RESPIRATORY: No cough, wheezing, chills or hemoptysis; No shortness of breath  CARDIOVASCULAR: No chest pain, palpitations, dizziness, or leg swelling  GASTROINTESTINAL: No abdominal or epigastric pain. No nausea, vomiting, or hematemesis; No diarrhea or constipation. No melena or hematochezia.  GENITOURINARY: No dysuria, frequency, hematuria, or incontinence  NEUROLOGICAL: No headaches, memory loss, loss of strength, numbness, or tremors  SKIN: No itching, burning, rashes, or lesions   LYMPH NODES: No enlarged glands  ENDOCRINE: No heat or cold intolerance; No hair loss  MUSCULOSKELETAL: No joint pain or swelling; No muscle, back, or extremity pain  PSYCHIATRIC: No depression, anxiety, mood swings, or difficulty sleeping  HEME/LYMPH: No easy bruising, or bleeding gums  ALLERGY AND IMMUNOLOGIC: No hives or eczema    Vital Signs Last 24 Hrs  T(C): 36.4 (14 May 2024 04:51), Max: 36.5 (13 May 2024 11:13)  T(F): 97.5 (14 May 2024 04:51), Max: 97.7 (13 May 2024 11:13)  HR: 70 (14 May 2024 04:51) (62 - 71)  BP: 159/81 (14 May 2024 04:51) (139/79 - 159/81)  BP(mean): --  RR: 17 (14 May 2024 04:51) (17 - 18)  SpO2: 96% (14 May 2024 04:51) (94% - 96%)    Parameters below as of 14 May 2024 04:51  Patient On (Oxygen Delivery Method): room air        PHYSICAL EXAM:  GENERAL: NAD, well-groomed, well-developed  HEAD:  Atraumatic, Normocephalic  EYES: EOMI, PERRLA, conjunctiva and sclera clear  ENMT: No tonsillar erythema, exudates, or enlargement; Moist mucous membranes, Good dentition, No lesions  NECK: Supple, No JVD, Normal thyroid  NERVOUS SYSTEM:  Alert & Oriented X3, Good concentration; Motor Strength 5/5 B/L upper and lower extremities; DTRs 2+ intact and symmetric  CHEST/LUNG: Clear to ascultation  bilaterally; No rales, rhonchi, wheezing, or rubs  HEART: Regular rate and rhythm; No murmurs, rubs, or gallops  ABDOMEN: Soft, Nontender, Nondistended; Bowel sounds present  EXTREMITIES:  2+ Peripheral Pulses, No clubbing, cyanosis, or edema  LYMPH: No lymphadenopathy noted  SKIN: No rashes or lesions    LABS:                        10.7   4.67  )-----------( 228      ( 13 May 2024 06:52 )             31.4     05-13    142  |  113<H>  |  36<H>  ----------------------------<  213<H>  4.6   |  20<L>  |  2.40<H>    Ca    7.9<L>      13 May 2024 06:52  Phos  3.7     05-13  Mg     1.5     05-13    TPro  5.3<L>  /  Alb  1.6<L>  /  TBili  0.4  /  DBili  x   /  AST  19  /  ALT  20  /  AlkPhos  121<H>  05-13      Urinalysis Basic - ( 13 May 2024 06:52 )    Color: x / Appearance: x / SG: x / pH: x  Gluc: 213 mg/dL / Ketone: x  / Bili: x / Urobili: x   Blood: x / Protein: x / Nitrite: x   Leuk Esterase: x / RBC: x / WBC x   Sq Epi: x / Non Sq Epi: x / Bacteria: x      CAPILLARY BLOOD GLUCOSE      POCT Blood Glucose.: 192 mg/dL (13 May 2024 21:11)  POCT Blood Glucose.: 95 mg/dL (13 May 2024 16:25)  POCT Blood Glucose.: 176 mg/dL (13 May 2024 11:16)  POCT Blood Glucose.: 179 mg/dL (13 May 2024 07:40)      RADIOLOGY & ADDITIONAL TESTS:    Imaging Personally Reviewed:  [ ] YES  [ ] NO    Consultant(s) Notes Reviewed:  [ ] YES  [ ] NO    Care Discussed with Consultants/Other Providers [ ] YES  [ ] NO
HPI:  63F pmhx HTN, DM, metastatic renal cancer with mets to breast, pancreas, who presents for evaluation of persistent nausea and diffuse abd discomfort, diminished PO intake, with frequent daily yellowish loose stools up to 10 per day without any vomiting. Symptom duration x 1 week - pt states started after initiation of new chemo drug - was prescribed zofran 8mg bid and loperamide by oncologist without relief of symptoms so referred to ED. Pt reports generalized weakness. denies chest pain, sob, fever.   In ED vitals stable, CBC unremarkable, CMP noted for BUN/Cr 49/4.1, lactate 0.9, CT abd noted for colitis. Admitted for ARF and colitis.  (12 May 2024 02:10)  Patient is a 63y old  Female who presents with a chief complaint of     INTERVAL HPI/OVERNIGHT EVENTS:    MEDICATIONS  (STANDING):  amLODIPine   Tablet 10 milliGRAM(s) Oral daily  atorvastatin 10 milliGRAM(s) Oral at bedtime  carvedilol 12.5 milliGRAM(s) Oral every 12 hours  cefTRIAXone   IVPB 1000 milliGRAM(s) IV Intermittent every 24 hours  cholecalciferol 1000 Unit(s) Oral daily  dextrose 10% Bolus 125 milliLiter(s) IV Bolus once  dextrose 5%. 1000 milliLiter(s) (100 mL/Hr) IV Continuous <Continuous>  dextrose 5%. 1000 milliLiter(s) (50 mL/Hr) IV Continuous <Continuous>  dextrose 50% Injectable 25 Gram(s) IV Push once  dextrose 50% Injectable 12.5 Gram(s) IV Push once  gabapentin 300 milliGRAM(s) Oral at bedtime  glucagon  Injectable 1 milliGRAM(s) IntraMuscular once  heparin   Injectable 5000 Unit(s) SubCutaneous every 12 hours  insulin glargine Injectable (LANTUS) 13 Unit(s) SubCutaneous every morning  insulin glargine Injectable (LANTUS) 13 Unit(s) SubCutaneous at bedtime  insulin lispro (ADMELOG) corrective regimen sliding scale   SubCutaneous three times a day before meals  insulin lispro (ADMELOG) corrective regimen sliding scale   SubCutaneous at bedtime  lactated ringers. 1000 milliLiter(s) (100 mL/Hr) IV Continuous <Continuous>  levothyroxine 150 MICROGram(s) Oral daily  metroNIDAZOLE  IVPB 500 milliGRAM(s) IV Intermittent every 12 hours  montelukast 10 milliGRAM(s) Oral at bedtime    MEDICATIONS  (PRN):  acetaminophen     Tablet .. 650 milliGRAM(s) Oral every 6 hours PRN Temp greater or equal to 38C (100.4F), Mild Pain (1 - 3)  aluminum hydroxide/magnesium hydroxide/simethicone Suspension 30 milliLiter(s) Oral every 4 hours PRN Dyspepsia  dextrose Oral Gel 15 Gram(s) Oral once PRN Blood Glucose LESS THAN 70 milliGRAM(s)/deciliter  loratadine 10 milliGRAM(s) Oral daily PRN allergies  melatonin 3 milliGRAM(s) Oral at bedtime PRN Insomnia  ondansetron Injectable 4 milliGRAM(s) IV Push every 8 hours PRN Nausea and/or Vomiting      Allergies    sulfa drugs (Hives)    Intolerances    atorvastatin (Unknown)      REVIEW OF SYSTEMS:  CONSTITUTIONAL: No fever, weight loss, or fatigue  EYES: No eye pain, visual disturbances, or discharge  ENMT:  No difficulty hearing, tinnitus, vertigo; No sinus or throat pain  NECK: No pain or stiffness  BREASTS: No pain, masses, or nipple discharge  RESPIRATORY: No cough, wheezing, chills or hemoptysis; No shortness of breath  CARDIOVASCULAR: No chest pain, palpitations, dizziness, or leg swelling  GASTROINTESTINAL: No abdominal or epigastric pain. No nausea, vomiting, or hematemesis; No diarrhea or constipation. No melena or hematochezia.  GENITOURINARY: No dysuria, frequency, hematuria, or incontinence  NEUROLOGICAL: No headaches, memory loss, loss of strength, numbness, or tremors  SKIN: No itching, burning, rashes, or lesions   LYMPH NODES: No enlarged glands  ENDOCRINE: No heat or cold intolerance; No hair loss  MUSCULOSKELETAL: No joint pain or swelling; No muscle, back, or extremity pain  PSYCHIATRIC: No depression, anxiety, mood swings, or difficulty sleeping  HEME/LYMPH: No easy bruising, or bleeding gums  ALLERGY AND IMMUNOLOGIC: No hives or eczema    Vital Signs Last 24 Hrs  T(C): 36.4 (12 May 2024 10:55), Max: 36.4 (11 May 2024 16:31)  T(F): 97.6 (12 May 2024 10:55), Max: 97.6 (11 May 2024 16:31)  HR: 66 (12 May 2024 10:55) (64 - 70)  BP: 134/84 (12 May 2024 10:55) (134/84 - 159/84)  BP(mean): --  RR: 18 (12 May 2024 10:55) (17 - 18)  SpO2: 96% (12 May 2024 10:55) (95% - 96%)    Parameters below as of 12 May 2024 10:55  Patient On (Oxygen Delivery Method): room air        PHYSICAL EXAM:  GENERAL: NAD, well-groomed, well-developed  HEAD:  Atraumatic, Normocephalic  EYES: EOMI, PERRLA, conjunctiva and sclera clear  ENMT: No tonsillar erythema, exudates, or enlargement; Moist mucous membranes, Good dentition, No lesions  NECK: Supple, No JVD, Normal thyroid  NERVOUS SYSTEM:  Alert & Oriented X3, Good concentration; Motor Strength 5/5 B/L upper and lower extremities; DTRs 2+ intact and symmetric  CHEST/LUNG: Clear to ascultation  bilaterally; No rales, rhonchi, wheezing, or rubs  HEART: Regular rate and rhythm; No murmurs, rubs, or gallops  ABDOMEN: Soft, Nontender, Nondistended; Bowel sounds present  EXTREMITIES:  2+ Peripheral Pulses, No clubbing, cyanosis, or edema  LYMPH: No lymphadenopathy noted  SKIN: No rashes or lesions    LABS:                        13.1   7.39  )-----------( 238      ( 11 May 2024 13:30 )             39.2     05-11    134<L>  |  103  |  49<H>  ----------------------------<  207<H>  4.8   |  21<L>  |  4.10<H>    Ca    7.9<L>      11 May 2024 13:30    TPro  6.9  /  Alb  1.9<L>  /  TBili  0.5  /  DBili  x   /  AST  30  /  ALT  33  /  AlkPhos  184<H>  05-11      Urinalysis Basic - ( 11 May 2024 15:55 )    Color: Yellow / Appearance: Clear / S.009 / pH: x  Gluc: x / Ketone: Negative mg/dL  / Bili: Negative / Urobili: 0.2 mg/dL   Blood: x / Protein: 300 mg/dL / Nitrite: Negative   Leuk Esterase: Negative / RBC: 5 /HPF / WBC 2 /HPF   Sq Epi: x / Non Sq Epi: x / Bacteria: Occasional /HPF      CAPILLARY BLOOD GLUCOSE      POCT Blood Glucose.: 139 mg/dL (12 May 2024 10:55)  POCT Blood Glucose.: 156 mg/dL (12 May 2024 07:41)  POCT Blood Glucose.: 71 mg/dL (12 May 2024 05:04)  POCT Blood Glucose.: 241 mg/dL (11 May 2024 19:32)  POCT Blood Glucose.: 62 mg/dL (11 May 2024 18:27)  POCT Blood Glucose.: 58 mg/dL (11 May 2024 18:24)      RADIOLOGY & ADDITIONAL TESTS:    Imaging Personally Reviewed:  [ ] YES  [ ] NO    Consultant(s) Notes Reviewed:  [ ] YES  [ ] NO    Care Discussed with Consultants/Other Providers [ ] YES  [ ] NO

## 2024-05-14 ENCOUNTER — TRANSCRIPTION ENCOUNTER (OUTPATIENT)
Age: 64
End: 2024-05-14

## 2024-05-14 VITALS
OXYGEN SATURATION: 97 % | RESPIRATION RATE: 18 BRPM | HEART RATE: 73 BPM | DIASTOLIC BLOOD PRESSURE: 81 MMHG | TEMPERATURE: 98 F | SYSTOLIC BLOOD PRESSURE: 149 MMHG

## 2024-05-14 LAB
ANION GAP SERPL CALC-SCNC: 9 MMOL/L — SIGNIFICANT CHANGE UP (ref 5–17)
BUN SERPL-MCNC: 31 MG/DL — HIGH (ref 7–23)
CALCIUM SERPL-MCNC: 7.9 MG/DL — LOW (ref 8.5–10.1)
CHLORIDE SERPL-SCNC: 113 MMOL/L — HIGH (ref 96–108)
CO2 SERPL-SCNC: 25 MMOL/L — SIGNIFICANT CHANGE UP (ref 22–31)
CREAT SERPL-MCNC: 2.04 MG/DL — HIGH (ref 0.5–1.3)
EGFR: 27 ML/MIN/1.73M2 — LOW
GLUCOSE BLDC GLUCOMTR-MCNC: 119 MG/DL — HIGH (ref 70–99)
GLUCOSE BLDC GLUCOMTR-MCNC: 134 MG/DL — HIGH (ref 70–99)
GLUCOSE BLDC GLUCOMTR-MCNC: 63 MG/DL — LOW (ref 70–99)
GLUCOSE BLDC GLUCOMTR-MCNC: 72 MG/DL — SIGNIFICANT CHANGE UP (ref 70–99)
GLUCOSE SERPL-MCNC: 129 MG/DL — HIGH (ref 70–99)
HCT VFR BLD CALC: 32.8 % — LOW (ref 34.5–45)
HGB BLD-MCNC: 10.7 G/DL — LOW (ref 11.5–15.5)
MAGNESIUM SERPL-MCNC: 2.1 MG/DL — SIGNIFICANT CHANGE UP (ref 1.6–2.6)
MCHC RBC-ENTMCNC: 28.9 PG — SIGNIFICANT CHANGE UP (ref 27–34)
MCHC RBC-ENTMCNC: 32.6 G/DL — SIGNIFICANT CHANGE UP (ref 32–36)
MCV RBC AUTO: 88.6 FL — SIGNIFICANT CHANGE UP (ref 80–100)
NRBC # BLD: 0 /100 WBCS — SIGNIFICANT CHANGE UP (ref 0–0)
PHOSPHATE SERPL-MCNC: 3.8 MG/DL — SIGNIFICANT CHANGE UP (ref 2.5–4.5)
PLATELET # BLD AUTO: 250 K/UL — SIGNIFICANT CHANGE UP (ref 150–400)
POTASSIUM SERPL-MCNC: 4.9 MMOL/L — SIGNIFICANT CHANGE UP (ref 3.5–5.3)
POTASSIUM SERPL-SCNC: 4.9 MMOL/L — SIGNIFICANT CHANGE UP (ref 3.5–5.3)
RBC # BLD: 3.7 M/UL — LOW (ref 3.8–5.2)
RBC # FLD: 17.6 % — HIGH (ref 10.3–14.5)
SODIUM SERPL-SCNC: 147 MMOL/L — HIGH (ref 135–145)
WBC # BLD: 4.35 K/UL — SIGNIFICANT CHANGE UP (ref 3.8–10.5)
WBC # FLD AUTO: 4.35 K/UL — SIGNIFICANT CHANGE UP (ref 3.8–10.5)

## 2024-05-14 PROCEDURE — 99239 HOSP IP/OBS DSCHRG MGMT >30: CPT

## 2024-05-14 RX ORDER — DEXTROSE 10 % IN WATER 10 %
125 INTRAVENOUS SOLUTION INTRAVENOUS ONCE
Refills: 0 | Status: COMPLETED | OUTPATIENT
Start: 2024-05-14 | End: 2024-05-14

## 2024-05-14 RX ADMIN — INSULIN GLARGINE 13 UNIT(S): 100 INJECTION, SOLUTION SUBCUTANEOUS at 08:15

## 2024-05-14 RX ADMIN — HEPARIN SODIUM 5000 UNIT(S): 5000 INJECTION INTRAVENOUS; SUBCUTANEOUS at 05:41

## 2024-05-14 RX ADMIN — AMLODIPINE BESYLATE 10 MILLIGRAM(S): 2.5 TABLET ORAL at 05:41

## 2024-05-14 RX ADMIN — CARVEDILOL PHOSPHATE 12.5 MILLIGRAM(S): 80 CAPSULE, EXTENDED RELEASE ORAL at 05:41

## 2024-05-14 RX ADMIN — CEFTRIAXONE 100 MILLIGRAM(S): 500 INJECTION, POWDER, FOR SOLUTION INTRAMUSCULAR; INTRAVENOUS at 05:44

## 2024-05-14 RX ADMIN — Medication 500 MILLILITER(S): at 10:00

## 2024-05-14 RX ADMIN — Medication 150 MICROGRAM(S): at 05:41

## 2024-05-14 RX ADMIN — Medication 1000 UNIT(S): at 11:57

## 2024-05-14 RX ADMIN — Medication 500 MILLIGRAM(S): at 05:41

## 2024-05-14 RX ADMIN — SODIUM CHLORIDE 100 MILLILITER(S): 9 INJECTION, SOLUTION INTRAVENOUS at 05:39

## 2024-05-14 NOTE — DIETITIAN INITIAL EVALUATION ADULT - ENTER FROM (CAL/KG)
Follow up with Dr. Zahida Zhu tomorrow. I will share results with him. Follow up and call Dr. Clarisa Albert for oncology. I ordered a PET scan. I'll see you in 1-2 weeks to make sure all referrals are in place. Always here for any questions!
25

## 2024-05-14 NOTE — DIETITIAN INITIAL EVALUATION ADULT - NS FNS REASON FOR WEIGHT CHANG
Per reported usual wt. and adm wt., wt. loss of ~ 22 LBS noted in 1 year.  Pt is unsure of reason for wt. loss.

## 2024-05-14 NOTE — DISCHARGE NOTE PROVIDER - NSDCCPCAREPLAN_GEN_ALL_CORE_FT
PRINCIPAL DISCHARGE DIAGNOSIS  Diagnosis: JAX (acute kidney injury)  Assessment and Plan of Treatment:       SECONDARY DISCHARGE DIAGNOSES  Diagnosis: Colitis  Assessment and Plan of Treatment:     Diagnosis: DM (diabetes mellitus)  Assessment and Plan of Treatment:     Diagnosis: Renal cancer  Assessment and Plan of Treatment:     Diagnosis: Vitamin D deficiency  Assessment and Plan of Treatment:     Diagnosis: Hypothyroidism  Assessment and Plan of Treatment:     Diagnosis: Severe diarrhea  Assessment and Plan of Treatment:     Diagnosis: Metastatic renal cell carcinoma to liver  Assessment and Plan of Treatment:

## 2024-05-14 NOTE — DISCHARGE NOTE NURSING/CASE MANAGEMENT/SOCIAL WORK - PATIENT PORTAL LINK FT
You can access the FollowMyHealth Patient Portal offered by [facility] by registering at the following website: [email or web address]. By joining [facility]’s FollowMyHealth portal, you will also be able to view your health information using other applications (apps) compatible with our system.

## 2024-05-14 NOTE — DISCHARGE NOTE PROVIDER - CARE PROVIDER_API CALL
Tin Pelaez.  Medical Oncology  [address], [address]  [place] [address]  Phone: [phone]  Fax: [phone]  Follow Up Time:

## 2024-05-14 NOTE — DIETITIAN INITIAL EVALUATION ADULT - PROBLEM SELECTOR PLAN 5
On tresiba 26U BID  Hypoglycemic briefly while here  started on lantus 13 BID with ISF coverage  hypoglycemia protocol  adjust insulin as needed.

## 2024-05-14 NOTE — DIETITIAN INITIAL EVALUATION ADULT - OTHER INFO
Pt was on Tresiba 26 units 2 x day, Lispro 14 units at breakfast, 16 units at lunch, & 18 units c dinner, however pt will not take this amount if with low glucose.  Pt was also on Farxiga which was prescribed by her kidney doctor ABILIO.  The use of vitamin C, 1000 mg daily also noted on list home medication provided by pt.  Pt was advised to review all medication regimen c MD as per renal indices.  Per chart review, ARF & colitis improved, c history of metastatic renal cancer c mets to breast, pancreas.  Pt has CGM for glucose monitoring.  Pt reports low glucose @ night time, encouraged consumption of bedtime snack.  Pt educated on consistent CHO intake, blood glucose goals, low blood glucose signs, symptoms and treatment.  Pt without diarrhea @ present & c good appetite at present, appears to be meeting nutritional needs.  Pt does not appear to need nutritional supplement at present.  Advised to discuss c MD the use of nutritional supplement if appetite goes down.

## 2024-05-14 NOTE — DIETITIAN INITIAL EVALUATION ADULT - PERTINENT LABORATORY DATA
05-14    147<H>  |  113<H>  |  31<H>  ----------------------------<  129<H>  4.9   |  25  |  2.04<H>    Ca    7.9<L>      14 May 2024 09:57  Phos  3.8     05-14  Mg     2.1     05-14 05/14, POCT blood Glucose 63 (08:43)-> 119(09:41)    TPro  5.3<L>  /  Alb  1.6<L>  /  TBili  0.4  /  DBili  x   /  AST  19  /  ALT  20  /  AlkPhos  121<H>  05-13  POCT Blood Glucose.: 134 mg/dL (05-14-24 @ 11:45)  A1C with Estimated Average Glucose Result: 7.1 % <H>(05-13-24 @ 06:52)  A1C with Estimated Average Glucose Result: 6.3 % <H>(01-20-24 @ 07:30)

## 2024-05-14 NOTE — DIETITIAN INITIAL EVALUATION ADULT - PROBLEM SELECTOR PLAN 2
-Chemo induced vs infectious  -Start ceftriaxone and flagyl  -F/u stool pcr and culture  -PRN zofran

## 2024-05-14 NOTE — DISCHARGE NOTE PROVIDER - NSDCMRMEDTOKEN_GEN_ALL_CORE_FT
amLODIPine 10 mg oral tablet: 1 tab(s) orally once a day  atorvastatin 10 mg oral tablet: 1 tab(s) orally once a day (at bedtime)  Cabometyx 60 mg oral tablet: 1 tab(s) orally once a day  carvedilol 12.5 mg oral tablet: 1 tab(s) orally every 12 hours  cetirizine 10 mg oral tablet: 1 tab(s) orally once a day  cholecalciferol 1250 mcg (50,000 intl units) oral capsule: 1 cap(s) orally every 7 days  famotidine 20 mg oral tablet: 1 tab(s) orally once a day  gabapentin 300 mg oral capsule: 1 cap(s) orally once a day (at bedtime)  levothyroxine 150 mcg (0.15 mg) oral tablet: 1 tab(s) orally once a day  montelukast 10 mg oral tablet: 1 tab(s) orally once a day (at bedtime)  Tresiba 100 units/mL subcutaneous solution: 26 unit(s) subcutaneous 2 times a day  Vascepa 1 g oral capsule: 2 cap(s) orally 2 times a day   [email or web address] DISPLAY PLAN FREE TEXT

## 2024-05-14 NOTE — DIETITIAN INITIAL EVALUATION ADULT - NS FNS DIET ORDER
Diet, Renal Restrictions:   For patients receiving Renal Replacement - No Protein Restr, No Conc K, No Conc Phos, Low Sodium (05-12-24 @ 02:24) [Active]

## 2024-05-14 NOTE — DIETITIAN INITIAL EVALUATION ADULT - PERTINENT MEDS FT
MEDICATIONS  (STANDING):  amLODIPine   Tablet 10 milliGRAM(s) Oral daily  atorvastatin 10 milliGRAM(s) Oral at bedtime  carvedilol 12.5 milliGRAM(s) Oral every 12 hours  cefTRIAXone   IVPB 1000 milliGRAM(s) IV Intermittent every 24 hours  cholecalciferol 1000 Unit(s) Oral daily  dextrose 10% Bolus 125 milliLiter(s) IV Bolus once  dextrose 5%. 1000 milliLiter(s) (50 mL/Hr) IV Continuous <Continuous>  dextrose 5%. 1000 milliLiter(s) (100 mL/Hr) IV Continuous <Continuous>  dextrose 50% Injectable 12.5 Gram(s) IV Push once  dextrose 50% Injectable 25 Gram(s) IV Push once  gabapentin 300 milliGRAM(s) Oral at bedtime  glucagon  Injectable 1 milliGRAM(s) IntraMuscular once  heparin   Injectable 5000 Unit(s) SubCutaneous every 12 hours  insulin glargine Injectable (LANTUS) 13 Unit(s) SubCutaneous every morning  insulin glargine Injectable (LANTUS) 13 Unit(s) SubCutaneous at bedtime  insulin lispro (ADMELOG) corrective regimen sliding scale   SubCutaneous three times a day before meals  insulin lispro (ADMELOG) corrective regimen sliding scale   SubCutaneous at bedtime  lactated ringers. 1000 milliLiter(s) (100 mL/Hr) IV Continuous <Continuous>  lactated ringers. 1000 milliLiter(s) (100 mL/Hr) IV Continuous <Continuous>  levothyroxine 150 MICROGram(s) Oral daily  metroNIDAZOLE    Tablet 500 milliGRAM(s) Oral every 12 hours  montelukast 10 milliGRAM(s) Oral at bedtime    MEDICATIONS  (PRN):  acetaminophen     Tablet .. 650 milliGRAM(s) Oral every 6 hours PRN Temp greater or equal to 38C (100.4F), Mild Pain (1 - 3)  aluminum hydroxide/magnesium hydroxide/simethicone Suspension 30 milliLiter(s) Oral every 4 hours PRN Dyspepsia  dextrose Oral Gel 15 Gram(s) Oral once PRN Blood Glucose LESS THAN 70 milliGRAM(s)/deciliter  loratadine 10 milliGRAM(s) Oral daily PRN allergies  melatonin 3 milliGRAM(s) Oral at bedtime PRN Insomnia  ondansetron Injectable 4 milliGRAM(s) IV Push every 8 hours PRN Nausea and/or Vomiting

## 2024-05-14 NOTE — DIETITIAN INITIAL EVALUATION ADULT - ORAL INTAKE PTA/DIET HISTORY
Pt is Eritrean speaking, communicated with pt via Language Line  service,  Toro ID# [id number].  Pt's  who is also E[place]an speaking was also @ bedside.  Pt reports depressed appetite PTA, was started on chemo & got diarrhea from chemo.  Pt had HHA who did the shopping/cooking.  Diet PTA: CHO controlled for diabetes.

## 2024-05-14 NOTE — DISCHARGE NOTE PROVIDER - ATTENDING DISCHARGE PHYSICAL EXAMINATION:
T(C): 36.5 (05-14-24 @ 11:56), Max: 36.5 (05-13-24 @ 23:55)  HR: 74 (05-14-24 @ 11:56) (68 - 74)  BP: 151/82 (05-14-24 @ 11:56) (147/82 - 159/81)  RR: 18 (05-14-24 @ 11:56) (17 - 18)  SpO2: 96% (05-14-24 @ 11:56) (94% - 96%)    CONSTITUTIONAL: Well groomed, no apparent distress  EYES: PERRLA and symmetric, EOMI, No conjunctival or scleral injection, non-icteric  ENMT: Oral mucosa with moist membranes. Normal dentition; no pharyngeal injection or exudates             NECK: Supple, symmetric and without tracheal deviation   RESP: No respiratory distress, no use of accessory muscles; CTA b/l, no WRR  CV: RRR, +S1S2, no MRG; no JVD; no peripheral edema  GI: Soft, NT, ND, no rebound, no guarding; no palpable masses; no hepatosplenomegaly; no hernia palpated  LYMPH: No cervical LAD or tenderness; no axillary LAD or tenderness; no inguinal LAD or tenderness  MSK: Normal gait; No digital clubbing or cyanosis; examination of the (head/neck/spine/ribs/pelvis, RUE, LUE, RLE, LLE) without misalignment,            Normal ROM without pain, no spinal tenderness, normal muscle strength/tone  SKIN: No rashes or ulcers noted; no subcutaneous nodules or induration palpable  NEURO: CN II-XII intact; normal reflexes in upper and lower extremities, sensation intact in upper and lower extremities b/l to light touch   PSYCH: Appropriate insight/judgment; A+O x 3, mood and affect appropriate, recent/remote memory intact

## 2024-05-14 NOTE — DISCHARGE NOTE PROVIDER - HOSPITAL COURSE
HPI:  63F pmhx HTN, DM, metastatic renal cancer with mets to breast, pancreas, who presents for evaluation of persistent nausea and diffuse abd discomfort, diminished PO intake, with frequent daily yellowish loose stools up to 10 per day without any vomiting. Symptom duration x 1 week - pt states started after initiation of new chemo drug - was prescribed zofran 8mg bid and loperamide by oncologist without relief of symptoms so referred to ED. Pt reports generalized weakness. denies chest pain, sob, fever.   In ED vitals stable, CBC unremarkable, CMP noted for BUN/Cr 49/4.1, lactate 0.9, CT abd noted for colitis. Admitted for ARF and colitis.  (12 May 2024 02:10)      Improved will continue IVF  labs in AM continue to hold chemo meds   if labs are improved consider dc home        Problem/Plan - 1:  ·  Problem: ARF (acute renal failure).   ·  Plan: Acute on Chronic Kidney Injury  -Baseline Creatinine: ~ 1.5  -Creatinine on admission: 4.1  -Suspect pre-renal kidney injury.  -F/u US bladder and kidneys.  -Continue with IV hydration.  -Trend Creatinine.  -Avoid nephrotoxic drugs.  -Consider renal consult in am.     Problem/Plan - 2:  ·  Problem: Colitis.   ·  Plan: -Chemo induced vs infectious  -Start ceftriaxone and flagyl  -F/u stool pcr and culture  -PRN zofran.     Problem/Plan - 3:  ·  Problem: Hypothyroidism.   ·  Plan: c/w levothyroxine.     Problem/Plan - 4:  ·  Problem: Renal cancer.   ·  Plan: - noted to have hx of renal cancer follows with [facility] Dr. Pelaez  - [facility] to be consulted in AM.     Problem/Plan - 5:  ·  Problem: DM (diabetes mellitus).   ·  Plan: On tresiba 26U BID  Hypoglycemic briefly while here  started on lantus 13 BID with ISF coverage  hypoglycemia protocol  adjust insulin as needed.     Problem/Plan - 6:  ·  Problem: HTN (hypertension).   ·  Plan: controlled  c/w amlodipine and coreg.     Problem/Plan - 7:  ·  Problem: HLD (hyperlipidemia).   ·  Plan: c/w statin.     Problem/Plan - 8:  ·  Problem: Prophylactic measure.   ·  Plan: DVT ppx: HepsubQ  Renal diet  Fall precautions.

## 2024-05-21 DIAGNOSIS — A09 INFECTIOUS GASTROENTERITIS AND COLITIS, UNSPECIFIED: ICD-10-CM

## 2024-05-21 DIAGNOSIS — I10 ESSENTIAL (PRIMARY) HYPERTENSION: ICD-10-CM

## 2024-05-21 DIAGNOSIS — N17.9 ACUTE KIDNEY FAILURE, UNSPECIFIED: ICD-10-CM

## 2024-05-21 DIAGNOSIS — E03.9 HYPOTHYROIDISM, UNSPECIFIED: ICD-10-CM

## 2024-05-21 DIAGNOSIS — T45.1X5A ADVERSE EFFECT OF ANTINEOPLASTIC AND IMMUNOSUPPRESSIVE DRUGS, INITIAL ENCOUNTER: ICD-10-CM

## 2024-05-21 DIAGNOSIS — C79.81 SECONDARY MALIGNANT NEOPLASM OF BREAST: ICD-10-CM

## 2024-05-21 DIAGNOSIS — I12.9 HYPERTENSIVE CHRONIC KIDNEY DISEASE WITH STAGE 1 THROUGH STAGE 4 CHRONIC KIDNEY DISEASE, OR UNSPECIFIED CHRONIC KIDNEY DISEASE: ICD-10-CM

## 2024-05-21 DIAGNOSIS — E86.0 DEHYDRATION: ICD-10-CM

## 2024-05-21 DIAGNOSIS — E78.5 HYPERLIPIDEMIA, UNSPECIFIED: ICD-10-CM

## 2024-05-21 DIAGNOSIS — C64.9 MALIGNANT NEOPLASM OF UNSPECIFIED KIDNEY, EXCEPT RENAL PELVIS: ICD-10-CM

## 2024-05-21 DIAGNOSIS — C78.89 SECONDARY MALIGNANT NEOPLASM OF OTHER DIGESTIVE ORGANS: ICD-10-CM

## 2024-05-21 DIAGNOSIS — K52.1 TOXIC GASTROENTERITIS AND COLITIS: ICD-10-CM

## 2024-05-21 DIAGNOSIS — E11.22 TYPE 2 DIABETES MELLITUS WITH DIABETIC CHRONIC KIDNEY DISEASE: ICD-10-CM

## 2024-05-21 DIAGNOSIS — E11.649 TYPE 2 DIABETES MELLITUS WITH HYPOGLYCEMIA WITHOUT COMA: ICD-10-CM

## 2024-05-28 ENCOUNTER — TRANSCRIPTION ENCOUNTER (OUTPATIENT)
Age: 64
End: 2024-05-28

## 2024-08-29 ENCOUNTER — APPOINTMENT (OUTPATIENT)
Dept: CT IMAGING | Facility: CLINIC | Age: 64
End: 2024-08-29
Payer: MEDICARE

## 2024-08-29 PROCEDURE — 74176 CT ABD & PELVIS W/O CONTRAST: CPT

## 2024-09-17 ENCOUNTER — OUTPATIENT (OUTPATIENT)
Dept: OUTPATIENT SERVICES | Facility: HOSPITAL | Age: 64
LOS: 1 days | Discharge: HOME | End: 2024-09-17

## 2024-09-17 DIAGNOSIS — Z98.890 OTHER SPECIFIED POSTPROCEDURAL STATES: Chronic | ICD-10-CM

## 2024-09-17 DIAGNOSIS — Z90.710 ACQUIRED ABSENCE OF BOTH CERVIX AND UTERUS: Chronic | ICD-10-CM

## 2024-09-19 DIAGNOSIS — C64.2 MALIGNANT NEOPLASM OF LEFT KIDNEY, EXCEPT RENAL PELVIS: ICD-10-CM

## 2025-02-17 ENCOUNTER — APPOINTMENT (OUTPATIENT)
Dept: CT IMAGING | Facility: CLINIC | Age: 65
End: 2025-02-17

## 2025-02-17 PROCEDURE — 74176 CT ABD & PELVIS W/O CONTRAST: CPT

## 2025-03-12 ENCOUNTER — OUTPATIENT (OUTPATIENT)
Dept: OUTPATIENT SERVICES | Facility: HOSPITAL | Age: 65
LOS: 1 days | Discharge: HOME | End: 2025-03-12

## 2025-03-12 DIAGNOSIS — Z98.890 OTHER SPECIFIED POSTPROCEDURAL STATES: Chronic | ICD-10-CM

## 2025-03-12 DIAGNOSIS — Z90.710 ACQUIRED ABSENCE OF BOTH CERVIX AND UTERUS: Chronic | ICD-10-CM

## 2025-03-13 DIAGNOSIS — C64.2 MALIGNANT NEOPLASM OF LEFT KIDNEY, EXCEPT RENAL PELVIS: ICD-10-CM

## 2025-03-15 NOTE — DISCHARGE NOTE NURSING/CASE MANAGEMENT/SOCIAL WORK - CAREGIVER RELATION TO PATIENT
Patient:  RACHEL MERLINI  [id number]    Reason for consult: Shiley catheter placement    HPI:  89F with PMH of HTN, DM2 on insulin, CAD s/p PCI, HFrEF, presenting with acute onset of R shin pain.    Patient examined with daughter at bedside. States that approx 3 hours prior to arrival, patient with acute pain to R shin area. Denies prior trauma or inciting event. Of note, patient with multiple chronic wounds in R leg. Has been followed by wound care clinic but never seen vascular surgeon prior. On arrival denies fever, chills, SOB, CP, abd pain, n/v/d.    Upon arrival patient underwent imaging which revealed occlusion of L popliteal artery and thus was started on heparin gtt. Noted to be with worsening renal function throughout stay and has been followed by Nephro. ROB presumed to be 2/2 to contrast. Due to worsening uremia, patient recommended for urgent HD with nephro. MICU consulted due to potential for difficult Shiley placement in setting of patient on heparin gtt, on plavix, and with patient likely to need sedation.    PAST MEDICAL & SURGICAL HISTORY:  HTN (hypertension)  Dyslipidemia  Diabetes mellitus  Palpitations  STEMI (ST elevation myocardial infarction)  CHF (congestive heart failure)  S/P cholecystectomy  S/P appendectomy  S/P lumpectomy, left breast  premalignant  S/P tonsillectomy  S/P cardiac cath    FAMILY HISTORY:    SOCIAL HISTORY:    Allergies  Kiwi (Anaphylaxis)  codeine (Unknown)  penicillins (Anaphylaxis)    Intolerances    HOME MEDICATIONS:    REVIEW OF SYSTEMS:  [X] Negative except as stated in HPI    OBJECTIVE:  T(F): 97.4 (03-15-25 @ 14:54), Max: 97.9 (03-14-25 @ 19:39)  HR: 69 (03-15-25 @ 14:54) (69 - 88)  BP: 93/57 (03-15-25 @ 14:54) (93/57 - 120/54)  BP(mean): --  ABP: --  ABP(mean): --  RR: 18 (03-15-25 @ 14:54) (18 - 18)  SpO2: 100% (03-15-25 @ 14:54) (99% - 100%)  CVP(mm Hg): --    I/O Summary 24H    IN: 800 mL / OUT: 850 mL / NET: -50 mL    CAPILLARY BLOOD GLUCOSE  POCT Blood Glucose.: 228 mg/dL (15 Mar 2025 12:28)    PHYSICAL EXAM:  GENERAL: NAD, lying in bed comfortably  HEAD: Atraumatic, Normocephalic  EYES: EOMI, PERRLA, conjunctiva and sclera clear  ENT: Moist mucous membranes  NECK: Supple, No JVD  CHEST/LUNG: Clear to auscultation bilaterally; No rales, rhonchi, wheezing, or rubs. Unlabored respirations  HEART: Regular rate and rhythm; No murmurs, rubs, or gallops  ABDOMEN: Bowel sounds present; Soft, Nontender, Nondistended. No hepatomegaly  EXTREMITIES:  2+ Peripheral Pulses, brisk capillary refill. No clubbing, cyanosis, or edema  NERVOUS SYSTEM:  Alert & Oriented X2-3, speech clear. No deficits     HOSPITAL MEDICATIONS:  MEDICATIONS  (STANDING):  cefTRIAXone   IVPB 1000 milliGRAM(s) IV Intermittent every 24 hours  chlorhexidine 4% Liquid 1 Application(s) Topical <User Schedule>  clopidogrel Tablet 75 milliGRAM(s) Oral daily  dextrose 5%. 1000 milliLiter(s) (100 mL/Hr) IV Continuous <Continuous>  dextrose 5%. 1000 milliLiter(s) (50 mL/Hr) IV Continuous <Continuous>  dextrose 50% Injectable 25 Gram(s) IV Push once  dextrose 50% Injectable 12.5 Gram(s) IV Push once  dextrose 50% Injectable 25 Gram(s) IV Push once  glucagon  Injectable 1 milliGRAM(s) IntraMuscular once  heparin  Infusion. 800 Unit(s)/Hr (8 mL/Hr) IV Continuous <Continuous>  influenza  Vaccine (HIGH DOSE) 0.5 milliLiter(s) IntraMuscular once  insulin lispro (ADMELOG) corrective regimen sliding scale   SubCutaneous three times a day before meals  insulin lispro (ADMELOG) corrective regimen sliding scale   SubCutaneous at bedtime  levothyroxine 50 MICROGram(s) Oral daily  sodium bicarbonate 650 milliGRAM(s) Oral three times a day  sodium chloride 0.9%. 1000 milliLiter(s) (50 mL/Hr) IV Continuous <Continuous>  sodium zirconium cyclosilicate 10 Gram(s) Oral three times a day    MEDICATIONS  (PRN):  acetaminophen     Tablet .. 650 milliGRAM(s) Oral every 6 hours PRN Temp greater or equal to 38C (100.4F), Mild Pain (1 - 3)  dextrose Oral Gel 15 Gram(s) Oral once PRN Blood Glucose LESS THAN 70 milliGRAM(s)/deciliter  heparin   Injectable 5500 Unit(s) IV Push every 6 hours PRN For aPTT less than 40  heparin   Injectable 2500 Unit(s) IV Push every 6 hours PRN For aPTT between 40 - 57  melatonin 3 milliGRAM(s) Oral at bedtime PRN Insomnia  metoclopramide Injectable 10 milliGRAM(s) IV Push every 8 hours PRN 2nd choice for nausea/vomiting  ondansetron Injectable 4 milliGRAM(s) IV Push every 8 hours PRN Nausea and/or Vomiting  sodium chloride 0.9% lock flush 10 milliLiter(s) IV Push every 1 hour PRN Pre/post blood products, medications, blood draw, and to maintain line patency    LABS:  CBC 03-15-25 @ 08:50                        6.5    10.47 )-----------( 99                    19.5     Hgb trend: 6.5 <-- , 7.4 <-- , 8.1 <-- , 9.5 <-- , 10.0 <-- , 10.1 <--   WBC trend: 10.47 <-- , 11.24 <-- , 10.06 <-- , 12.78 <-- , 7.23 <-- , 7.00 <--     CMP 03-15-25 @ 06:59    138  |  98  |  >151[H]  ----------------------------<  213[H]  5.1   |  16[L]  |  7.14[H]    Ca    8.3[L]      03-15-25 @ 06:59    Serum Cr (eGFR) trend: 7.14 (5) <-- , 7.02 (5) <-- , 6.62 (5) <-- , 6.49 (6) <-- , 6.01 (6) <--     PTT - ( 15 Mar 2025 08:50 ):91.8 sec    ABG Trend:     VBG Trend: niece

## 2025-08-18 ENCOUNTER — APPOINTMENT (OUTPATIENT)
Dept: CT IMAGING | Facility: CLINIC | Age: 65
End: 2025-08-18
Payer: MEDICARE

## 2025-08-18 PROCEDURE — 71250 CT THORAX DX C-: CPT

## 2025-08-18 PROCEDURE — 74176 CT ABD & PELVIS W/O CONTRAST: CPT
